# Patient Record
Sex: FEMALE | Race: WHITE | NOT HISPANIC OR LATINO | Employment: OTHER | URBAN - METROPOLITAN AREA
[De-identification: names, ages, dates, MRNs, and addresses within clinical notes are randomized per-mention and may not be internally consistent; named-entity substitution may affect disease eponyms.]

---

## 2017-02-27 RX ORDER — CARVEDILOL 6.25 MG/1
TABLET ORAL
Qty: 60 TABLET | Refills: 5 | Status: SHIPPED | OUTPATIENT
Start: 2017-02-27 | End: 2017-08-27 | Stop reason: SDUPTHER

## 2017-03-24 RX ORDER — EZETIMIBE 10 MG/1
TABLET ORAL
Qty: 30 TABLET | Refills: 5 | Status: SHIPPED | OUTPATIENT
Start: 2017-03-24 | End: 2017-04-28 | Stop reason: SDUPTHER

## 2017-04-28 RX ORDER — EZETIMIBE 10 MG/1
TABLET ORAL
Qty: 30 TABLET | Refills: 12 | Status: SHIPPED | OUTPATIENT
Start: 2017-04-28 | End: 2017-04-28 | Stop reason: SDUPTHER

## 2017-04-28 RX ORDER — EZETIMIBE 10 MG/1
TABLET ORAL
Qty: 30 TABLET | Refills: 12 | Status: SHIPPED | OUTPATIENT
Start: 2017-04-28 | End: 2017-06-16

## 2017-05-26 DIAGNOSIS — E03.9 ACQUIRED HYPOTHYROIDISM: ICD-10-CM

## 2017-05-26 RX ORDER — LEVOTHYROXINE SODIUM 0.05 MG/1
TABLET ORAL
Qty: 90 TABLET | Refills: 1 | Status: SHIPPED | OUTPATIENT
Start: 2017-05-26 | End: 2017-11-17 | Stop reason: SDUPTHER

## 2017-06-16 DIAGNOSIS — E78.5 HYPERLIPIDEMIA, UNSPECIFIED HYPERLIPIDEMIA TYPE: Primary | ICD-10-CM

## 2017-06-16 RX ORDER — SIMVASTATIN 10 MG
10 TABLET ORAL NIGHTLY
Qty: 90 TABLET | Refills: 2 | Status: SHIPPED | OUTPATIENT
Start: 2017-06-16 | End: 2018-03-26 | Stop reason: SDUPTHER

## 2017-06-29 DIAGNOSIS — K21.00 REFLUX ESOPHAGITIS: ICD-10-CM

## 2017-06-30 RX ORDER — OMEPRAZOLE 20 MG/1
CAPSULE, DELAYED RELEASE ORAL
Qty: 90 CAPSULE | Refills: 0 | Status: SHIPPED | OUTPATIENT
Start: 2017-06-30 | End: 2017-09-26 | Stop reason: SDUPTHER

## 2017-07-18 DIAGNOSIS — I10 ESSENTIAL HYPERTENSION: ICD-10-CM

## 2017-07-18 RX ORDER — LOSARTAN POTASSIUM AND HYDROCHLOROTHIAZIDE 25; 100 MG/1; MG/1
TABLET ORAL
Qty: 90 TABLET | Refills: 0 | Status: SHIPPED | OUTPATIENT
Start: 2017-07-18 | End: 2017-08-10

## 2017-08-03 DIAGNOSIS — E78.5 HYPERLIPIDEMIA, UNSPECIFIED HYPERLIPIDEMIA TYPE: Primary | ICD-10-CM

## 2017-08-03 DIAGNOSIS — E11.9 TYPE 2 DIABETES MELLITUS WITHOUT COMPLICATION, UNSPECIFIED LONG TERM INSULIN USE STATUS: ICD-10-CM

## 2017-08-03 DIAGNOSIS — E03.9 HYPOTHYROIDISM, UNSPECIFIED TYPE: ICD-10-CM

## 2017-08-03 LAB
ALBUMIN SERPL-MCNC: 4.3 G/DL (ref 3.5–5.2)
ALBUMIN/GLOB SERPL: 2 G/DL
ALP SERPL-CCNC: 61 U/L (ref 40–129)
ALT SERPL-CCNC: 21 U/L (ref 5–33)
AST SERPL-CCNC: 19 U/L (ref 5–32)
BASOPHILS # BLD AUTO: 0.03 10*3/MM3 (ref 0–0.2)
BASOPHILS NFR BLD AUTO: 0.7 % (ref 0–2)
BILIRUB SERPL-MCNC: 0.5 MG/DL (ref 0.2–1.2)
BUN SERPL-MCNC: 22 MG/DL (ref 8–23)
BUN/CREAT SERPL: 26.8 (ref 7–25)
CALCIUM SERPL-MCNC: 9.2 MG/DL (ref 8.8–10.5)
CHLORIDE SERPL-SCNC: 102 MMOL/L (ref 98–107)
CHOLEST SERPL-MCNC: 216 MG/DL (ref 0–200)
CHOLEST/HDLC SERPL: 3.54 {RATIO}
CO2 SERPL-SCNC: 27.4 MMOL/L (ref 22–29)
CREAT SERPL-MCNC: 0.82 MG/DL (ref 0.57–1)
EOSINOPHIL # BLD AUTO: 0.11 10*3/MM3 (ref 0.1–0.3)
EOSINOPHIL NFR BLD AUTO: 2.5 % (ref 0–4)
ERYTHROCYTE [DISTWIDTH] IN BLOOD BY AUTOMATED COUNT: 12.4 % (ref 11.5–14.5)
GLOBULIN SER CALC-MCNC: 2.2 GM/DL
GLUCOSE SERPL-MCNC: 92 MG/DL (ref 65–99)
HBA1C MFR BLD: 5.1 % (ref 4.8–5.6)
HCT VFR BLD AUTO: 38.7 % (ref 37–47)
HDLC SERPL-MCNC: 61 MG/DL (ref 40–60)
HGB BLD-MCNC: 12.9 G/DL (ref 12–16)
IMM GRANULOCYTES # BLD: 0.01 10*3/MM3 (ref 0–0.03)
IMM GRANULOCYTES NFR BLD: 0.2 % (ref 0–0.5)
LDLC SERPL CALC-MCNC: 126 MG/DL (ref 0–100)
LYMPHOCYTES # BLD AUTO: 1.25 10*3/MM3 (ref 0.6–4.8)
LYMPHOCYTES NFR BLD AUTO: 28.8 % (ref 20–45)
MCH RBC QN AUTO: 32.8 PG (ref 27–31)
MCHC RBC AUTO-ENTMCNC: 33.3 G/DL (ref 31–37)
MCV RBC AUTO: 98.5 FL (ref 81–99)
MONOCYTES # BLD AUTO: 0.37 10*3/MM3 (ref 0–1)
MONOCYTES NFR BLD AUTO: 8.5 % (ref 3–8)
NEUTROPHILS # BLD AUTO: 2.57 10*3/MM3 (ref 1.5–8.3)
NEUTROPHILS NFR BLD AUTO: 59.3 % (ref 45–70)
NRBC BLD AUTO-RTO: 0 /100 WBC (ref 0–0)
PLATELET # BLD AUTO: 246 10*3/MM3 (ref 140–500)
POTASSIUM SERPL-SCNC: 3.8 MMOL/L (ref 3.5–5.2)
PROT SERPL-MCNC: 6.5 G/DL (ref 6–8.5)
RBC # BLD AUTO: 3.93 10*6/MM3 (ref 4.2–5.4)
SODIUM SERPL-SCNC: 141 MMOL/L (ref 136–145)
TRIGL SERPL-MCNC: 146 MG/DL (ref 0–150)
TSH SERPL DL<=0.005 MIU/L-ACNC: 1.8 MIU/ML (ref 0.27–4.2)
VLDLC SERPL CALC-MCNC: 29.2 MG/DL (ref 7–27)
WBC # BLD AUTO: 4.34 10*3/MM3 (ref 4.8–10.8)

## 2017-08-10 ENCOUNTER — OFFICE VISIT (OUTPATIENT)
Dept: INTERNAL MEDICINE | Facility: CLINIC | Age: 72
End: 2017-08-10

## 2017-08-10 VITALS
WEIGHT: 185 LBS | OXYGEN SATURATION: 97 % | SYSTOLIC BLOOD PRESSURE: 120 MMHG | HEIGHT: 65 IN | HEART RATE: 77 BPM | RESPIRATION RATE: 16 BRPM | BODY MASS INDEX: 30.82 KG/M2 | DIASTOLIC BLOOD PRESSURE: 68 MMHG

## 2017-08-10 DIAGNOSIS — R32 INCONTINENCE IN FEMALE: ICD-10-CM

## 2017-08-10 DIAGNOSIS — E03.9 ACQUIRED HYPOTHYROIDISM: ICD-10-CM

## 2017-08-10 DIAGNOSIS — I10 ESSENTIAL HYPERTENSION: Primary | ICD-10-CM

## 2017-08-10 DIAGNOSIS — E78.2 MIXED HYPERLIPIDEMIA: ICD-10-CM

## 2017-08-10 DIAGNOSIS — M19.90 OSTEOARTHRITIS, UNSPECIFIED OSTEOARTHRITIS TYPE, UNSPECIFIED SITE: ICD-10-CM

## 2017-08-10 PROCEDURE — 99214 OFFICE O/P EST MOD 30 MIN: CPT | Performed by: INTERNAL MEDICINE

## 2017-08-10 RX ORDER — TRAMADOL HYDROCHLORIDE 50 MG/1
50 TABLET ORAL EVERY 6 HOURS PRN
Qty: 30 TABLET | Refills: 2 | Status: SHIPPED | OUTPATIENT
Start: 2017-08-10 | End: 2017-09-07

## 2017-08-10 RX ORDER — LOSARTAN POTASSIUM 100 MG/1
100 TABLET ORAL DAILY
Qty: 90 TABLET | Refills: 2 | Status: SHIPPED | OUTPATIENT
Start: 2017-08-10 | End: 2017-09-07

## 2017-08-10 NOTE — PROGRESS NOTES
Subjective   Carla Serrano is a 72 y.o. female.     Hypertension   Pertinent negatives include no chest pain, headaches or palpitations.        71 y/o WF with history of HTN, Hypothyroidism who presents for yearly follow up.  Recently taken off of the Zetia. Feels like she is thinking more clearly.  Though gained weight over the last year, feels like she is loosing a little weight.  Can definitely feel a difference.  Feels overall better.  Had been on this medicine for approx 2 years.  Eating well.  Having salads for lunch with low fat.  Eats eggs or substitutes for breakfast.  Does drink OJ in the AM.  Has two 12 oz Pepsi per day.  Milk for dinner.  Not much water.  Gardens during the summer for exercise.  Was previously doing regular exercise and plans to get back into this.  Would go to gym 3 x per week.  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      Overall energy level feels pretty stable.  Sometimes she feels down, depressed.  But this resolves spontaneously after a brief time.  Doesn't note any association with symptoms.  Never been medical therapy for depression.      Takes tylenol 650 every 6 hours for pain/muscle weakness. This weakness is all over and improves with tylenol.  No falls.      Allergies: Now on allegra for symptoms.  Rhinorrhea, congestion.  Improvess with medication.      Incontinence.  Patient has to wear pads every day due to incontinence.  No dysuria, foul smelling urine.  Worsens with cough, laughter etc.  Does not follow with OBGYN    Pt noted some dark red spotting in stool in association with mobic.  Has decreased dose to 1/4 tab.  Has worsening heartburn with this.  Cannot take other NSAIDS.  Continues to require omeprazole (for multiple years) with continued symptoms.  States last c scope is 5 years ago and normal.      The following portions of the patient's history were reviewed and updated as appropriate: allergies, current medications, past family history, past medical history, past  social history, past surgical history and problem list.    Review of Systems   Constitutional: Negative for appetite change, fatigue, fever and unexpected weight change.   HENT: Positive for congestion and rhinorrhea. Negative for ear discharge, sinus pressure and trouble swallowing.    Respiratory: Negative for cough and chest tightness.    Cardiovascular: Negative for chest pain, palpitations and leg swelling.   Gastrointestinal: Negative for constipation and diarrhea.   Genitourinary: Positive for enuresis. Negative for difficulty urinating, dysuria, frequency, hematuria, pelvic pain, vaginal bleeding and vaginal discharge.   Musculoskeletal: Positive for arthralgias and back pain.   Skin: Negative.    Neurological: Negative for dizziness, syncope, light-headedness and headaches.   Hematological: Negative.    Psychiatric/Behavioral: Negative.  Negative for behavioral problems, hallucinations and suicidal ideas.       Objective   Physical Exam   Constitutional: She is oriented to person, place, and time. She appears well-developed and well-nourished.   HENT:   Head: Normocephalic and atraumatic.   Right Ear: External ear normal.   Left Ear: External ear normal.   Eyes: EOM are normal. Pupils are equal, round, and reactive to light.   Neck: Normal range of motion. Neck supple. No JVD present. No tracheal deviation present. No thyromegaly present.   Cardiovascular: Normal rate, regular rhythm and normal heart sounds.  Exam reveals no friction rub.    No murmur heard.  Pulmonary/Chest: Effort normal and breath sounds normal. No respiratory distress. She has no wheezes.   Lymphadenopathy:     She has no cervical adenopathy.   Neurological: She is alert and oriented to person, place, and time. No cranial nerve deficit.   Skin: Skin is warm and dry.   Psychiatric: She has a normal mood and affect. Her behavior is normal.   Vitals reviewed.      Assessment/Plan   Carla was seen today for hypertension and  hypothyroidism.    Diagnoses and all orders for this visit:    Essential hypertension  -     losartan (COZAAR) 100 MG tablet; Take 1 tablet by mouth Daily.    Mixed hyperlipidemia    Acquired hypothyroidism    Incontinence in female  -     POC Urinalysis Dipstick, Automated    Osteoarthritis, unspecified osteoarthritis type, unspecified site  -     traMADol (ULTRAM) 50 MG tablet; Take 1 tablet by mouth Every 6 (Six) Hours As Needed for Moderate Pain (4-6).      HLD  - Total cholesterol increased but HDL as well. Ratio appropriate  - Zyetia d/c due to high cost.  Simvastatin increased to 10mg daily  - Continue with low sugar diet.  HbA1c decreased and appropriate.    Pelvic floor PT at results physiotherapy is an option  Start with stopping the HCTZ from the losartan  Check BP at home since we are taking away that diuretic  Goal <140/90    AR - feel free to take a 24 hour claritin or allegra    Rectal bleeding - likely due to mobic - stop that for now.  Having significant heartburn with her mobic. Stop for now  Replace with tramadol  Try to stay off mobic 6 weeks. Low threshold for GI referral for EGD      Weight gain - .  Already doing well with her thyroid and diet.        Patient seen and examined with resident physician. Agree with assessment and plan.  Spent 30 min in care of patient.

## 2017-08-28 RX ORDER — CARVEDILOL 6.25 MG/1
TABLET ORAL
Qty: 60 TABLET | Refills: 4 | Status: SHIPPED | OUTPATIENT
Start: 2017-08-28 | End: 2018-01-24 | Stop reason: SDUPTHER

## 2017-09-01 ENCOUNTER — TELEPHONE (OUTPATIENT)
Dept: INTERNAL MEDICINE | Facility: CLINIC | Age: 72
End: 2017-09-01

## 2017-09-01 NOTE — TELEPHONE ENCOUNTER
----- Message from Tor Clarke MD sent at 9/1/2017 12:53 PM EDT -----  Labs are stable no changes.      Pt given lab results. dg

## 2017-09-07 ENCOUNTER — RESULTS ENCOUNTER (OUTPATIENT)
Dept: INTERNAL MEDICINE | Facility: CLINIC | Age: 72
End: 2017-09-07

## 2017-09-07 ENCOUNTER — OFFICE VISIT (OUTPATIENT)
Dept: INTERNAL MEDICINE | Facility: CLINIC | Age: 72
End: 2017-09-07

## 2017-09-07 VITALS
WEIGHT: 187.4 LBS | BODY MASS INDEX: 31.22 KG/M2 | SYSTOLIC BLOOD PRESSURE: 128 MMHG | RESPIRATION RATE: 18 BRPM | DIASTOLIC BLOOD PRESSURE: 78 MMHG | HEART RATE: 84 BPM | OXYGEN SATURATION: 98 % | HEIGHT: 65 IN

## 2017-09-07 DIAGNOSIS — I10 ESSENTIAL HYPERTENSION: ICD-10-CM

## 2017-09-07 DIAGNOSIS — M17.12 PRIMARY OSTEOARTHRITIS OF LEFT KNEE: ICD-10-CM

## 2017-09-07 DIAGNOSIS — Z12.11 COLON CANCER SCREENING: Primary | ICD-10-CM

## 2017-09-07 DIAGNOSIS — Z12.11 COLON CANCER SCREENING: ICD-10-CM

## 2017-09-07 PROCEDURE — 99214 OFFICE O/P EST MOD 30 MIN: CPT | Performed by: INTERNAL MEDICINE

## 2017-09-07 RX ORDER — MELOXICAM 7.5 MG/1
7.5 TABLET ORAL DAILY
Qty: 90 TABLET | Refills: 2 | Status: SHIPPED | OUTPATIENT
Start: 2017-09-07 | End: 2018-07-12 | Stop reason: SINTOL

## 2017-09-07 RX ORDER — LOSARTAN POTASSIUM AND HYDROCHLOROTHIAZIDE 25; 100 MG/1; MG/1
1 TABLET ORAL DAILY
Qty: 90 TABLET | Refills: 2 | Status: SHIPPED | OUTPATIENT
Start: 2017-09-07 | End: 2017-11-06 | Stop reason: SDUPTHER

## 2017-09-07 NOTE — PROGRESS NOTES
"Subjective   Carla Serrano is a 72 y.o. female.     History of Present Illness   73 yo female with pmhx HTN. Was having significant urgency and incontinence with stress, so stopped the hctz component of her losartan hctz. Her BP did great but she got leg swelling so went back on losartan hctz.      She is coughing more now with hay fever, so getting a little worse.     She was having rectal bleeding we attributed to mobic, but tramadol made her sleepy, even half. She is now taking 1/2 mobic. SHe is still having rectal bleeding, very insignificant amounts.  She had a bad experience with her colonoscopy 5 years ago. She was awake during her colonoscopy.    Needs ears cleaned    Wants moles checked today.  Has 3 she worries about on her back.    The following portions of the patient's history were reviewed and updated as appropriate: allergies, current medications, past family history, past medical history, past social history, past surgical history and problem list.    Review of Systems   Constitutional: Negative for fatigue.   HENT: Positive for congestion and rhinorrhea. Negative for postnasal drip and sore throat.         Wax, requesting clean out.   Respiratory: Positive for cough.    Cardiovascular: Positive for leg swelling.        Better on hctz   Gastrointestinal: Negative.    Genitourinary: Negative.         Still with stress incontinence   Musculoskeletal: Positive for arthralgias, joint swelling and myalgias.   Hematological: Negative.    Psychiatric/Behavioral: Negative.    All other systems reviewed and are negative.      Objective   Physical Exam  /78  Pulse 84  Resp 18  Ht 65\" (165.1 cm)  Wt 187 lb 6.4 oz (85 kg)  SpO2 98%  BMI 31.18 kg/m2    General Appearance:  Alert, cooperative, no distress, appears stated age   Head:  Normocephalic, without obvious abnormality, atraumatic   Eyes:  PERRL, conjunctiva/corneas clear, EOM's intact   Ears:  Normal TM's and external ear canals, both ears "   Nose: Nares normal, septum midline,mucosa normal, no drainage or sinus tenderness   Throat: Lips, mucosa, and tongue normal; teeth and gums normal   Neck: Supple, symmetrical, trachea midline   Back:   Symmetric, no curvature, ROM normal, no CVA tenderness   Lungs:   Clear to auscultation bilaterally, respirations unlabored   Breasts:  No masses or tenderness   Heart:  Regular rate and rhythm, S1 and S2 normal, no murmur, rub, or gallop   Abdomen:   Soft, non-tender, bowel sounds active all four quadrants,  no masses, no organomegaly   Pelvic: Deferred   Extremities: Extremities normal, atraumatic, no cyanosis or edema   Pulses: 2+ and symmetric   Skin: Skin color, texture, turgor normal, no rashes or lesions   Lymph nodes: Cervical, supraclavicular, and axillary nodes normal   Neurologic: Normal       Assessment/Plan   Carla was seen today for heartburn.    Diagnoses and all orders for this visit:    Colon cancer screening  -     Cologuard; Future    Primary osteoarthritis of left knee  -     meloxicam (MOBIC) 7.5 MG tablet; Take 1 tablet by mouth Daily.    Essential hypertension  -     losartan-hydrochlorothiazide (HYZAAR) 100-25 MG per tablet; Take 1 tablet by mouth Daily.      Cerumen extraction today. Recommend debrox regularly at home several times per week.    Stop losartan plain, change to losartan hctz.  Will tolerate stress incontinence    Decrease mobic to 7.5   Ensure bleeding is not malignant, ordered cologuard.     HTN - well controlled.

## 2017-09-26 DIAGNOSIS — K21.00 REFLUX ESOPHAGITIS: ICD-10-CM

## 2017-09-27 RX ORDER — OMEPRAZOLE 20 MG/1
CAPSULE, DELAYED RELEASE ORAL
Qty: 90 CAPSULE | Refills: 0 | Status: SHIPPED | OUTPATIENT
Start: 2017-09-27 | End: 2017-12-27 | Stop reason: SDUPTHER

## 2017-11-06 DIAGNOSIS — I10 ESSENTIAL HYPERTENSION: ICD-10-CM

## 2017-11-06 RX ORDER — LOSARTAN POTASSIUM AND HYDROCHLOROTHIAZIDE 25; 100 MG/1; MG/1
1 TABLET ORAL DAILY
Qty: 90 TABLET | Refills: 2 | Status: SHIPPED | OUTPATIENT
Start: 2017-11-06 | End: 2018-10-26 | Stop reason: SDUPTHER

## 2017-11-17 DIAGNOSIS — E03.9 ACQUIRED HYPOTHYROIDISM: ICD-10-CM

## 2017-11-17 RX ORDER — LEVOTHYROXINE SODIUM 0.05 MG/1
TABLET ORAL
Qty: 90 TABLET | Refills: 1 | Status: SHIPPED | OUTPATIENT
Start: 2017-11-17 | End: 2018-05-22 | Stop reason: SDUPTHER

## 2017-12-27 DIAGNOSIS — K21.00 REFLUX ESOPHAGITIS: ICD-10-CM

## 2017-12-27 RX ORDER — OMEPRAZOLE 20 MG/1
CAPSULE, DELAYED RELEASE ORAL
Qty: 90 CAPSULE | Refills: 0 | Status: SHIPPED | OUTPATIENT
Start: 2017-12-27 | End: 2018-03-26 | Stop reason: SDUPTHER

## 2018-01-24 RX ORDER — CARVEDILOL 6.25 MG/1
TABLET ORAL
Qty: 60 TABLET | Refills: 3 | Status: SHIPPED | OUTPATIENT
Start: 2018-01-24 | End: 2018-05-25 | Stop reason: SDUPTHER

## 2018-03-26 DIAGNOSIS — K21.00 REFLUX ESOPHAGITIS: ICD-10-CM

## 2018-03-26 DIAGNOSIS — E78.5 HYPERLIPIDEMIA, UNSPECIFIED HYPERLIPIDEMIA TYPE: ICD-10-CM

## 2018-03-26 RX ORDER — SIMVASTATIN 10 MG
TABLET ORAL
Qty: 90 TABLET | Refills: 1 | Status: SHIPPED | OUTPATIENT
Start: 2018-03-26 | End: 2018-09-27 | Stop reason: SDUPTHER

## 2018-03-26 RX ORDER — OMEPRAZOLE 20 MG/1
CAPSULE, DELAYED RELEASE ORAL
Qty: 90 CAPSULE | Refills: 0 | Status: SHIPPED | OUTPATIENT
Start: 2018-03-26 | End: 2018-06-25 | Stop reason: SDUPTHER

## 2018-05-22 DIAGNOSIS — E03.9 ACQUIRED HYPOTHYROIDISM: ICD-10-CM

## 2018-05-22 RX ORDER — LEVOTHYROXINE SODIUM 0.05 MG/1
TABLET ORAL
Qty: 90 TABLET | Refills: 0 | Status: SHIPPED | OUTPATIENT
Start: 2018-05-22 | End: 2018-08-23 | Stop reason: SDUPTHER

## 2018-05-25 RX ORDER — CARVEDILOL 6.25 MG/1
TABLET ORAL
Qty: 60 TABLET | Refills: 2 | Status: SHIPPED | OUTPATIENT
Start: 2018-05-25 | End: 2018-08-23 | Stop reason: SDUPTHER

## 2018-06-25 DIAGNOSIS — K21.00 REFLUX ESOPHAGITIS: ICD-10-CM

## 2018-06-25 RX ORDER — OMEPRAZOLE 20 MG/1
CAPSULE, DELAYED RELEASE ORAL
Qty: 90 CAPSULE | Refills: 0 | Status: SHIPPED | OUTPATIENT
Start: 2018-06-25 | End: 2018-07-12

## 2018-07-12 ENCOUNTER — OFFICE VISIT (OUTPATIENT)
Dept: INTERNAL MEDICINE | Facility: CLINIC | Age: 73
End: 2018-07-12

## 2018-07-12 VITALS
HEIGHT: 65 IN | WEIGHT: 180.2 LBS | BODY MASS INDEX: 30.02 KG/M2 | OXYGEN SATURATION: 98 % | RESPIRATION RATE: 16 BRPM | DIASTOLIC BLOOD PRESSURE: 84 MMHG | HEART RATE: 103 BPM | SYSTOLIC BLOOD PRESSURE: 120 MMHG

## 2018-07-12 DIAGNOSIS — R11.2 NAUSEA AND VOMITING, INTRACTABILITY OF VOMITING NOT SPECIFIED, UNSPECIFIED VOMITING TYPE: Primary | ICD-10-CM

## 2018-07-12 DIAGNOSIS — Z79.899 HIGH RISK MEDICATION USE: ICD-10-CM

## 2018-07-12 DIAGNOSIS — K21.00 REFLUX ESOPHAGITIS: ICD-10-CM

## 2018-07-12 DIAGNOSIS — E78.2 MIXED HYPERLIPIDEMIA: ICD-10-CM

## 2018-07-12 DIAGNOSIS — I10 ESSENTIAL HYPERTENSION: ICD-10-CM

## 2018-07-12 PROBLEM — R11.10 VOMITING: Status: ACTIVE | Noted: 2018-07-12

## 2018-07-12 LAB
ALBUMIN SERPL-MCNC: 4.6 G/DL (ref 3.5–5.2)
ALBUMIN/GLOB SERPL: 1.8 G/DL
ALP SERPL-CCNC: 69 U/L (ref 40–129)
ALT SERPL-CCNC: 23 U/L (ref 5–33)
AMYLASE SERPL-CCNC: 125 U/L (ref 28–100)
AST SERPL-CCNC: 21 U/L (ref 5–32)
BASOPHILS # BLD AUTO: 0.03 10*3/MM3 (ref 0–0.2)
BASOPHILS NFR BLD AUTO: 0.6 % (ref 0–2)
BILIRUB SERPL-MCNC: 0.6 MG/DL (ref 0.2–1.2)
BUN SERPL-MCNC: 17 MG/DL (ref 8–23)
BUN/CREAT SERPL: 24.6 (ref 7–25)
CALCIUM SERPL-MCNC: 9.8 MG/DL (ref 8.8–10.5)
CHLORIDE SERPL-SCNC: 105 MMOL/L (ref 98–107)
CO2 SERPL-SCNC: 29.6 MMOL/L (ref 22–29)
CREAT SERPL-MCNC: 0.69 MG/DL (ref 0.57–1)
EOSINOPHIL # BLD AUTO: 0.03 10*3/MM3 (ref 0.1–0.3)
EOSINOPHIL NFR BLD AUTO: 0.6 % (ref 0–4)
ERYTHROCYTE [DISTWIDTH] IN BLOOD BY AUTOMATED COUNT: 12.6 % (ref 11.5–14.5)
GLOBULIN SER CALC-MCNC: 2.5 GM/DL
GLUCOSE SERPL-MCNC: 104 MG/DL (ref 65–99)
HCT VFR BLD AUTO: 40.3 % (ref 37–47)
HGB BLD-MCNC: 13.7 G/DL (ref 12–16)
IMM GRANULOCYTES # BLD: 0.01 10*3/MM3 (ref 0–0.03)
IMM GRANULOCYTES NFR BLD: 0.2 % (ref 0–0.5)
LIPASE SERPL-CCNC: 52 U/L (ref 13–60)
LYMPHOCYTES # BLD AUTO: 1.1 10*3/MM3 (ref 0.6–4.8)
LYMPHOCYTES NFR BLD AUTO: 20.4 % (ref 20–45)
MCH RBC QN AUTO: 33.9 PG (ref 27–31)
MCHC RBC AUTO-ENTMCNC: 34 G/DL (ref 31–37)
MCV RBC AUTO: 99.8 FL (ref 81–99)
MONOCYTES # BLD AUTO: 0.32 10*3/MM3 (ref 0–1)
MONOCYTES NFR BLD AUTO: 5.9 % (ref 3–8)
NEUTROPHILS # BLD AUTO: 3.9 10*3/MM3 (ref 1.5–8.3)
NEUTROPHILS NFR BLD AUTO: 72.3 % (ref 45–70)
NRBC BLD AUTO-RTO: 0 /100 WBC (ref 0–0)
PLATELET # BLD AUTO: 256 10*3/MM3 (ref 140–500)
POTASSIUM SERPL-SCNC: 3.7 MMOL/L (ref 3.5–5.2)
PROT SERPL-MCNC: 7.1 G/DL (ref 6–8.5)
RBC # BLD AUTO: 4.04 10*6/MM3 (ref 4.2–5.4)
SODIUM SERPL-SCNC: 147 MMOL/L (ref 136–145)
WBC # BLD AUTO: 5.39 10*3/MM3 (ref 4.8–10.8)

## 2018-07-12 PROCEDURE — 99214 OFFICE O/P EST MOD 30 MIN: CPT | Performed by: INTERNAL MEDICINE

## 2018-07-12 PROCEDURE — 93000 ELECTROCARDIOGRAM COMPLETE: CPT | Performed by: INTERNAL MEDICINE

## 2018-07-12 RX ORDER — OMEPRAZOLE 40 MG/1
40 CAPSULE, DELAYED RELEASE ORAL DAILY
Qty: 30 CAPSULE | Refills: 0 | Status: SHIPPED | OUTPATIENT
Start: 2018-07-12 | End: 2018-10-15 | Stop reason: SDUPTHER

## 2018-07-12 RX ORDER — ONDANSETRON 8 MG/1
8 TABLET, ORALLY DISINTEGRATING ORAL EVERY 8 HOURS PRN
Qty: 30 TABLET | Refills: 0 | Status: SHIPPED | OUTPATIENT
Start: 2018-07-12 | End: 2020-07-24

## 2018-07-12 NOTE — PROGRESS NOTES
Carla Serrano is a 73 y.o. female, who presents with a chief complaint of   Chief Complaint   Patient presents with   • Vomiting       HPI     74 yo female with pmhx HTN on arb, HL on statin, NSAID for OA - here for acute vomiting since last night.   She reports some similar symptoms previously.  She can drink, then about 15 min after eating will start to vomit.  Since last seen in August she has decreased her mobic. She stopped her mobic in July. She has continued to take prilosec daily. She has trouble with acid reflux at dinner too, been ongoing for several months at intermittent times.     No recent fever, chills. No diarrhea. No cough, dyspnea, cp. Does describe occasional palpitations.     The following portions of the patient's history were reviewed and updated as appropriate: allergies, current medications, past family history, past medical history, past social history, past surgical history and problem list.    Allergies: Patient has no known allergies.    Current Outpatient Prescriptions:   •  aspirin 81 MG tablet, Take 81 mg by mouth every night., Disp: , Rfl:   •  B Complex Vitamins (VITAMIN B COMPLEX IJ), Take 1 tablet by mouth 2 (two) times a day., Disp: , Rfl:   •  calcium citrate-vitamin d (CALCITRATE) 315-250 MG-UNIT tablet tablet, Take 2 tablets by mouth every night., Disp: , Rfl:   •  carvedilol (COREG) 6.25 MG tablet, TAKE ONE TABLET BY MOUTH TWICE A DAY WITH MEALS, Disp: 60 tablet, Rfl: 2  •  Cholecalciferol (VITAMIN D-3) 1000 UNITS capsule, Take 1,000 Units by mouth every night., Disp: , Rfl:   •  ciclopirox (PENLAC) 8 % solution, Apply 1 application topically As Needed (nail fungus). APPLY TO AFFECTED NAIL DAILY, Disp: 6.6 mL, Rfl: 1  •  CO ENZYME Q-10 PO, Take 100 mg by mouth 2 (two) times a day., Disp: , Rfl:   •  Flaxseed, Linseed, (FLAXSEED OIL) 1200 MG capsule, Take 2 capsules by mouth daily., Disp: , Rfl:   •  Ginkgo Biloba Extract 60 MG capsule, Take 1 tablet by mouth every  "morning., Disp: , Rfl:   •  Glucosamine-Chondroit-Vit C-Mn (GLUCOSAMINE CHONDR 1500 COMPLX) capsule, Take 2 capsules by mouth every morning., Disp: , Rfl:   •  levothyroxine (SYNTHROID, LEVOTHROID) 50 MCG tablet, TAKE ONE TABLET BY MOUTH EVERY MORNING, Disp: 90 tablet, Rfl: 0  •  losartan-hydrochlorothiazide (HYZAAR) 100-25 MG per tablet, Take 1 tablet by mouth Daily., Disp: 90 tablet, Rfl: 2  •  Misc Natural Products (ECHINACEA GOLDENSEAL PLUS) capsule, Take 900 mg by mouth every morning., Disp: , Rfl:   •  Multiple Vitamin (MULTIVITAMINS PO), Take 1 tablet by mouth daily., Disp: , Rfl:   •  Potassium Gluconate 595 MG capsule, Take 595 mcg/day by mouth every morning., Disp: , Rfl:   •  simvastatin (ZOCOR) 10 MG tablet, TAKE ONE TABLET BY MOUTH ONCE NIGHTLY, Disp: 90 tablet, Rfl: 1  •  omeprazole (PRILOSEC) 40 MG capsule, Take 1 capsule by mouth Daily., Disp: 30 capsule, Rfl: 0  •  ondansetron ODT (ZOFRAN ODT) 8 MG disintegrating tablet, Take 1 tablet by mouth Every 8 (Eight) Hours As Needed for Nausea or Vomiting., Disp: 30 tablet, Rfl: 0  Medications Discontinued During This Encounter   Medication Reason   • meloxicam (MOBIC) 7.5 MG tablet Side effects   • omeprazole (priLOSEC) 20 MG capsule        Review of Systems   Constitutional: Negative.    HENT: Negative.    Eyes: Negative.    Gastrointestinal: Positive for nausea and vomiting. Negative for abdominal pain and diarrhea.   Musculoskeletal:        OA   Neurological: Positive for dizziness. Negative for syncope.        Mild dizziness   All other systems reviewed and are negative.            /84   Pulse 103   Resp 16   Ht 165.1 cm (65\")   Wt 81.7 kg (180 lb 3.2 oz)   SpO2 98%   BMI 29.99 kg/m²       Physical Exam   Constitutional: She is oriented to person, place, and time. She appears well-developed and well-nourished.   HENT:   Head: Normocephalic and atraumatic.   Right Ear: External ear normal.   Left Ear: External ear normal.   Mouth/Throat: " Oropharynx is clear and moist.   MMM   Eyes: EOM are normal. Pupils are equal, round, and reactive to light.   Neck: Normal range of motion. Neck supple. No thyromegaly present.   Cardiovascular: Normal rate, regular rhythm and normal heart sounds.  Exam reveals no friction rub.    No murmur heard.  Pulmonary/Chest: Effort normal and breath sounds normal. No respiratory distress. She has no wheezes.   Abdominal: She exhibits no distension. There is no tenderness.   Neurological: She is alert and oriented to person, place, and time.   Skin: Skin is warm and dry. Capillary refill takes less than 2 seconds.   Psychiatric: She has a normal mood and affect. Her behavior is normal.   Nursing note and vitals reviewed.      Lab Results (most recent)     None          Results for orders placed or performed in visit on 08/03/17   Comprehensive Metabolic Panel   Result Value Ref Range    Glucose 92 65 - 99 mg/dL    BUN 22 8 - 23 mg/dL    Creatinine 0.82 0.57 - 1.00 mg/dL    eGFR Non African Am 69 >60 mL/min/1.73    eGFR African Am 83 >60 mL/min/1.73    BUN/Creatinine Ratio 26.8 (H) 7.0 - 25.0    Sodium 141 136 - 145 mmol/L    Potassium 3.8 3.5 - 5.2 mmol/L    Chloride 102 98 - 107 mmol/L    Total CO2 27.4 22.0 - 29.0 mmol/L    Calcium 9.2 8.8 - 10.5 mg/dL    Total Protein 6.5 6.0 - 8.5 g/dL    Albumin 4.30 3.50 - 5.20 g/dL    Globulin 2.2 gm/dL    A/G Ratio 2.0 g/dL    Total Bilirubin 0.5 0.2 - 1.2 mg/dL    Alkaline Phosphatase 61 40 - 129 U/L    AST (SGOT) 19 5 - 32 U/L    ALT (SGPT) 21 5 - 33 U/L   Hemoglobin A1c   Result Value Ref Range    Hemoglobin A1C 5.10 4.80 - 5.60 %   Lipid Panel With / Chol / HDL Ratio   Result Value Ref Range    Total Cholesterol 216 (H) 0 - 200 mg/dL    Triglycerides 146 0 - 150 mg/dL    HDL Cholesterol 61 (H) 40 - 60 mg/dL    VLDL Cholesterol 29.2 (H) 7 - 27 mg/dL    LDL Cholesterol  126 (H) 0 - 100 mg/dL    Chol/HDL Ratio 3.54    TSH   Result Value Ref Range    TSH 1.800 0.270 - 4.200 mIU/mL    CBC & Differential   Result Value Ref Range    WBC 4.34 (L) 4.80 - 10.80 10*3/mm3    RBC 3.93 (L) 4.20 - 5.40 10*6/mm3    Hemoglobin 12.9 12.0 - 16.0 g/dL    Hematocrit 38.7 37.0 - 47.0 %    MCV 98.5 81.0 - 99.0 fL    MCH 32.8 (H) 27.0 - 31.0 pg    MCHC 33.3 31.0 - 37.0 g/dL    RDW 12.4 11.5 - 14.5 %    Platelets 246 140 - 500 10*3/mm3    Neutrophil Rel % 59.3 45.0 - 70.0 %    Lymphocyte Rel % 28.8 20.0 - 45.0 %    Monocyte Rel % 8.5 (H) 3.0 - 8.0 %    Eosinophil Rel % 2.5 0.0 - 4.0 %    Basophil Rel % 0.7 0.0 - 2.0 %    Neutrophils Absolute 2.57 1.50 - 8.30 10*3/mm3    Lymphocytes Absolute 1.25 0.60 - 4.80 10*3/mm3    Monocytes Absolute 0.37 0.00 - 1.00 10*3/mm3    Eosinophils Absolute 0.11 0.10 - 0.30 10*3/mm3    Basophils Absolute 0.03 0.00 - 0.20 10*3/mm3    Immature Granulocyte Rel % 0.2 0.0 - 0.5 %    Immature Grans Absolute 0.01 0.00 - 0.03 10*3/mm3    nRBC 0.0 0.0 - 0.0 /100 WBC     ECG 12 Lead  Date/Time: 7/12/2018 12:39 PM  Performed by: KAYY GEORGES  Authorized by: KAYY GEORGES   Interpreted by ED physician  Comparison: not compared with previous ECG   Rhythm: sinus rhythm  Rate: normal  BPM: 72  Conduction: conduction normal  ST Segments: ST segments normal  T Waves: T waves normal  QRS axis: normal  Clinical impression: normal ECG                  Carla was seen today for vomiting.    Diagnoses and all orders for this visit:    Nausea and vomiting, intractability of vomiting not specified, unspecified vomiting type  -     ondansetron ODT (ZOFRAN ODT) 8 MG disintegrating tablet; Take 1 tablet by mouth Every 8 (Eight) Hours As Needed for Nausea or Vomiting.  -     Comprehensive metabolic panel  -     CBC w AUTO Differential  -     Lipase  -     Amylase    Essential hypertension  -     ECG 12 Lead    Mixed hyperlipidemia    High risk medication use    Reflux esophagitis  -     omeprazole (PRILOSEC) 40 MG capsule; Take 1 capsule by mouth Daily.  -     Ambulatory Referral to Gastroenterology  -      ECG 12 Lead    Vomiting in last 24 hours more refractory  Likely vomiting syndrome on top of KEYUR associated with NSAID use  Stop mobic  Hold losartan and zocor while vomiting  Start zofran  [place] diet  ER if unable to keep any fluids down even with zofran on board  Will recommend EGD due to chronicity/colonoscopy due regardless as screen.      Return in about 4 weeks (around 8/9/2018).    Tor Clarke MD  07/12/2018

## 2018-07-12 NOTE — PATIENT INSTRUCTIONS
Carla was seen today for vomiting.    Diagnoses and all orders for this visit:    Nausea and vomiting, intractability of vomiting not specified, unspecified vomiting type  -     ondansetron ODT (ZOFRAN ODT) 8 MG disintegrating tablet; Take 1 tablet by mouth Every 8 (Eight) Hours As Needed for Nausea or Vomiting.    Essential hypertension    Mixed hyperlipidemia    High risk medication use    Reflux esophagitis  -     omeprazole (PRILOSEC) 40 MG capsule; Take 1 capsule by mouth Daily.  -     Ambulatory Referral to Gastroenterology    Other orders  -     ECG 12 Lead    Vomiting in last 24 hours more refractory  Likely vomiting syndrome on top of KEYUR associated with NSAID use  Stop mobic  Hold losartan and zocor while vomiting  Start zofran  [place] diet  ER if unable to keep any fluids down even with zofran on board  Will recommend EGD due to chronicity/colonoscopy due regardless as screen.      Return in about 4 weeks (around 8/9/2018).    Tor Clarke MD

## 2018-07-17 ENCOUNTER — TELEPHONE (OUTPATIENT)
Dept: INTERNAL MEDICINE | Facility: CLINIC | Age: 73
End: 2018-07-17

## 2018-07-17 NOTE — TELEPHONE ENCOUNTER
----- Message from Tor Clarke MD sent at 7/13/2018  5:16 PM EDT -----  Labs are good, how is she feeling.  IF her vomiting continues may need to go the ER for IVF and further evaluation but I hope things are calming down.    Pt is feeling much better and given lab results.dg

## 2018-08-02 ENCOUNTER — TELEPHONE (OUTPATIENT)
Dept: INTERNAL MEDICINE | Facility: CLINIC | Age: 73
End: 2018-08-02

## 2018-08-02 NOTE — TELEPHONE ENCOUNTER
Dr GEORGES, PLEASE ADVISE:    Patient called saying that she has decided not to go through with her GI referral. Stated that among some extra stressors going on currently for her making it more difficult to get to get to an appt than usual, she is no longer symptomatic and has dc'd the mobic entirely and has noticed a tremendous improvement since doing so. Just wanted you to be aware! Thanks.

## 2018-08-23 DIAGNOSIS — E03.9 ACQUIRED HYPOTHYROIDISM: ICD-10-CM

## 2018-08-23 RX ORDER — LEVOTHYROXINE SODIUM 0.05 MG/1
TABLET ORAL
Qty: 90 TABLET | Refills: 1 | Status: SHIPPED | OUTPATIENT
Start: 2018-08-23 | End: 2019-02-18 | Stop reason: SDUPTHER

## 2018-08-23 RX ORDER — CARVEDILOL 6.25 MG/1
TABLET ORAL
Qty: 180 TABLET | Refills: 1 | Status: SHIPPED | OUTPATIENT
Start: 2018-08-23 | End: 2019-02-21 | Stop reason: SDUPTHER

## 2018-09-27 DIAGNOSIS — E78.5 HYPERLIPIDEMIA, UNSPECIFIED HYPERLIPIDEMIA TYPE: ICD-10-CM

## 2018-09-27 RX ORDER — SIMVASTATIN 10 MG
TABLET ORAL
Qty: 90 TABLET | Refills: 1 | Status: SHIPPED | OUTPATIENT
Start: 2018-09-27 | End: 2019-03-28 | Stop reason: SDUPTHER

## 2018-10-15 RX ORDER — OMEPRAZOLE 20 MG/1
CAPSULE, DELAYED RELEASE ORAL
Qty: 90 CAPSULE | Refills: 1 | Status: SHIPPED | OUTPATIENT
Start: 2018-10-15 | End: 2019-04-12 | Stop reason: SDUPTHER

## 2018-10-26 DIAGNOSIS — I10 ESSENTIAL HYPERTENSION: ICD-10-CM

## 2018-10-26 RX ORDER — LOSARTAN POTASSIUM AND HYDROCHLOROTHIAZIDE 25; 100 MG/1; MG/1
TABLET ORAL
Qty: 90 TABLET | Refills: 1 | Status: SHIPPED | OUTPATIENT
Start: 2018-10-26 | End: 2019-04-17 | Stop reason: SDUPTHER

## 2019-02-18 DIAGNOSIS — E03.9 ACQUIRED HYPOTHYROIDISM: ICD-10-CM

## 2019-02-18 RX ORDER — LEVOTHYROXINE SODIUM 0.05 MG/1
TABLET ORAL
Qty: 90 TABLET | Refills: 0 | Status: SHIPPED | OUTPATIENT
Start: 2019-02-18 | End: 2019-05-16 | Stop reason: SDUPTHER

## 2019-02-21 RX ORDER — CARVEDILOL 6.25 MG/1
TABLET ORAL
Qty: 180 TABLET | Refills: 1 | Status: SHIPPED | OUTPATIENT
Start: 2019-02-21 | End: 2019-08-23 | Stop reason: SDUPTHER

## 2019-03-28 DIAGNOSIS — E78.5 HYPERLIPIDEMIA, UNSPECIFIED HYPERLIPIDEMIA TYPE: ICD-10-CM

## 2019-03-28 RX ORDER — SIMVASTATIN 10 MG
TABLET ORAL
Qty: 90 TABLET | Refills: 1 | Status: SHIPPED | OUTPATIENT
Start: 2019-03-28 | End: 2019-09-24 | Stop reason: SDUPTHER

## 2019-04-12 RX ORDER — OMEPRAZOLE 20 MG/1
CAPSULE, DELAYED RELEASE ORAL
Qty: 90 CAPSULE | Refills: 0 | Status: SHIPPED | OUTPATIENT
Start: 2019-04-12 | End: 2019-07-10 | Stop reason: SDUPTHER

## 2019-04-17 DIAGNOSIS — I10 ESSENTIAL HYPERTENSION: ICD-10-CM

## 2019-04-17 RX ORDER — LOSARTAN POTASSIUM AND HYDROCHLOROTHIAZIDE 25; 100 MG/1; MG/1
TABLET ORAL
Qty: 90 TABLET | Refills: 0 | Status: SHIPPED | OUTPATIENT
Start: 2019-04-17 | End: 2019-07-22 | Stop reason: SDUPTHER

## 2019-04-18 ENCOUNTER — OFFICE VISIT (OUTPATIENT)
Dept: INTERNAL MEDICINE | Facility: CLINIC | Age: 74
End: 2019-04-18

## 2019-04-18 VITALS
HEART RATE: 99 BPM | TEMPERATURE: 97.6 F | WEIGHT: 185.2 LBS | RESPIRATION RATE: 16 BRPM | DIASTOLIC BLOOD PRESSURE: 72 MMHG | BODY MASS INDEX: 30.86 KG/M2 | OXYGEN SATURATION: 94 % | HEIGHT: 65 IN | SYSTOLIC BLOOD PRESSURE: 108 MMHG

## 2019-04-18 DIAGNOSIS — F32.A DEPRESSION, UNSPECIFIED DEPRESSION TYPE: Primary | ICD-10-CM

## 2019-04-18 DIAGNOSIS — E03.9 HYPOTHYROIDISM, UNSPECIFIED TYPE: ICD-10-CM

## 2019-04-18 PROCEDURE — 99213 OFFICE O/P EST LOW 20 MIN: CPT | Performed by: FAMILY MEDICINE

## 2019-04-18 RX ORDER — SENNOSIDES 8.6 MG
650 CAPSULE ORAL EVERY 8 HOURS PRN
COMMUNITY

## 2019-04-18 NOTE — PROGRESS NOTES
Carla Serrano is a 74 y.o. female, who presents with a chief complaint of   Chief Complaint   Patient presents with   • Depression   • Insomnia       HPI     Pt presents with the complaint of crying spells, irritable mood, dysphoric mood, fatigue, worsening over the past 9 months.  She is having trouble sleeping; she falls asleep okay but wakes up around 2 a.m. And can't get back to sleep.  She is taking ALLY-e and it helps her relax.  Denies SI.  She stays busy with her grandchildren.  She takes levothyroxine 50 mcg daily but hasn't had levels checked for 1.5 years.    The following portions of the patient's history were reviewed and updated as appropriate: allergies, current medications, past family history, past medical history, past social history, past surgical history and problem list.    Allergies: Patient has no known allergies.    Review of Systems   Constitutional: Positive for fatigue.   Gastrointestinal: Negative for constipation and diarrhea.   Musculoskeletal: Positive for arthralgias.   Psychiatric/Behavioral: Positive for dysphoric mood and sleep disturbance.             Wt Readings from Last 3 Encounters:   04/18/19 84 kg (185 lb 3.2 oz)   07/12/18 81.7 kg (180 lb 3.2 oz)   09/07/17 85 kg (187 lb 6.4 oz)     Temp Readings from Last 3 Encounters:   04/18/19 97.6 °F (36.4 °C) (Oral)   04/01/16 98.4 °F (36.9 °C) (Oral)     BP Readings from Last 3 Encounters:   04/18/19 108/72   07/12/18 120/84   09/07/17 128/78     Pulse Readings from Last 3 Encounters:   04/18/19 99   07/12/18 103   09/07/17 84     Body mass index is 30.82 kg/m².  @LASTSAO2(3)@    Physical Exam   Constitutional: She is oriented to person, place, and time. She appears well-developed and well-nourished. No distress.   HENT:   Head: Normocephalic and atraumatic.   Eyes: Conjunctivae and EOM are normal.   Neck: Neck supple. No thyromegaly present.   Cardiovascular: Normal rate, regular rhythm and normal heart sounds.    Pulmonary/Chest: Effort normal. No respiratory distress.   Abdominal: Soft. There is no tenderness.   Musculoskeletal: Normal range of motion. She exhibits no edema.   Neurological: She is alert and oriented to person, place, and time.   Skin: Skin is warm and dry.   Psychiatric: She has a normal mood and affect. Her behavior is normal.   Nursing note and vitals reviewed.        Carla was seen today for depression and insomnia.    Diagnoses and all orders for this visit:    Depression, unspecified depression type    Hypothyroidism, unspecified type  -     TSH  -     T4, Free    1. Depression.  Options discussed.  She wants to hold off on medication.  She will work on lifestyle measures and we'll check thyroid levels.  Declines counseling as well.    2. Hypothyroidism.  On replacement.  See #1.  Check thyroid levels.    Outpatient Medications Prior to Visit   Medication Sig Dispense Refill   • acetaminophen (TYLENOL) 650 MG 8 hr tablet Take 650 mg by mouth Every 8 (Eight) Hours As Needed.     • aspirin 81 MG tablet Take 81 mg by mouth every night.     • B Complex Vitamins (VITAMIN B COMPLEX IJ) Take 1 tablet by mouth 2 (two) times a day.     • carvedilol (COREG) 6.25 MG tablet TAKE ONE TABLET BY MOUTH TWICE A DAY WITH MEALS 180 tablet 1   • Cholecalciferol (VITAMIN D-3) 1000 UNITS capsule Take 1,000 Units by mouth every night.     • CO ENZYME Q-10 PO Take 100 mg by mouth 2 (two) times a day.     • Flaxseed, Linseed, (FLAXSEED OIL) 1200 MG capsule Take 2 capsules by mouth daily.     • Ginkgo Biloba Extract 60 MG capsule Take 1 tablet by mouth every morning.     • Glucosamine-Chondroit-Vit C-Mn (GLUCOSAMINE CHONDR 1500 COMPLX) capsule Take 2 capsules by mouth every morning.     • levothyroxine (SYNTHROID, LEVOTHROID) 50 MCG tablet TAKE ONE TABLET BY MOUTH EVERY MORNING 90 tablet 0   • losartan-hydrochlorothiazide (HYZAAR) 100-25 MG per tablet TAKE ONE TABLET BY MOUTH DAILY 90 tablet 0   • Misc Natural Products  (ECHINACEA GOLDENSEAL PLUS) capsule Take 900 mg by mouth every morning.     • Multiple Vitamin (MULTIVITAMINS PO) Take 1 tablet by mouth daily.     • omeprazole (priLOSEC) 20 MG capsule TAKE ONE CAPSULE BY MOUTH EVERY MORNING 90 capsule 0   • ondansetron ODT (ZOFRAN ODT) 8 MG disintegrating tablet Take 1 tablet by mouth Every 8 (Eight) Hours As Needed for Nausea or Vomiting. (Patient taking differently: Take 8 mg by mouth As Needed for Nausea or Vomiting.) 30 tablet 0   • Potassium Gluconate 595 MG capsule Take 595 mcg/day by mouth every morning.     • S-Adenosylmethionine (ALLY-E) 200 MG tablet delayed-release Take  by mouth 2 (Two) Times a Day.     • simvastatin (ZOCOR) 10 MG tablet TAKE ONE TABLET BY MOUTH ONCE NIGHTLY 90 tablet 1   • calcium citrate-vitamin d (CALCITRATE) 315-250 MG-UNIT tablet tablet Take 2 tablets by mouth every night.     • ciclopirox (PENLAC) 8 % solution Apply 1 application topically As Needed (nail fungus). APPLY TO AFFECTED NAIL DAILY 6.6 mL 1     No facility-administered medications prior to visit.      No orders of the defined types were placed in this encounter.    [unfilled]  Medications Discontinued During This Encounter   Medication Reason   • calcium citrate-vitamin d (CALCITRATE) 315-250 MG-UNIT tablet tablet *Re-Entry   • ciclopirox (PENLAC) 8 % solution *Therapy completed         Return for Next scheduled follow up.

## 2019-04-19 LAB
T4 FREE SERPL-MCNC: 1.34 NG/DL (ref 0.93–1.7)
TSH SERPL DL<=0.005 MIU/L-ACNC: 1.31 MIU/ML (ref 0.27–4.2)

## 2019-05-16 DIAGNOSIS — E03.9 ACQUIRED HYPOTHYROIDISM: ICD-10-CM

## 2019-05-16 RX ORDER — LEVOTHYROXINE SODIUM 0.05 MG/1
50 TABLET ORAL EVERY MORNING
Qty: 90 TABLET | Refills: 0 | Status: SHIPPED | OUTPATIENT
Start: 2019-05-16 | End: 2019-08-20 | Stop reason: SDUPTHER

## 2019-06-19 ENCOUNTER — OFFICE VISIT (OUTPATIENT)
Dept: INTERNAL MEDICINE | Facility: CLINIC | Age: 74
End: 2019-06-19

## 2019-06-19 VITALS
OXYGEN SATURATION: 93 % | TEMPERATURE: 97.6 F | HEIGHT: 65 IN | HEART RATE: 84 BPM | BODY MASS INDEX: 30.79 KG/M2 | DIASTOLIC BLOOD PRESSURE: 80 MMHG | SYSTOLIC BLOOD PRESSURE: 122 MMHG | WEIGHT: 184.8 LBS | RESPIRATION RATE: 18 BRPM

## 2019-06-19 DIAGNOSIS — R73.03 PREDIABETES: ICD-10-CM

## 2019-06-19 DIAGNOSIS — I10 ESSENTIAL HYPERTENSION: ICD-10-CM

## 2019-06-19 DIAGNOSIS — I25.10 CORONARY ARTERY DISEASE INVOLVING NATIVE CORONARY ARTERY OF NATIVE HEART WITHOUT ANGINA PECTORIS: Primary | ICD-10-CM

## 2019-06-19 DIAGNOSIS — E03.9 ACQUIRED HYPOTHYROIDISM: ICD-10-CM

## 2019-06-19 DIAGNOSIS — Z12.31 BREAST CANCER SCREENING BY MAMMOGRAM: ICD-10-CM

## 2019-06-19 DIAGNOSIS — Z78.0 POST-MENOPAUSAL: ICD-10-CM

## 2019-06-19 DIAGNOSIS — E78.2 MIXED HYPERLIPIDEMIA: ICD-10-CM

## 2019-06-19 DIAGNOSIS — G47.33 OSA (OBSTRUCTIVE SLEEP APNEA): ICD-10-CM

## 2019-06-19 LAB
ALBUMIN SERPL-MCNC: 4.8 G/DL (ref 3.5–5.2)
ALBUMIN/GLOB SERPL: 2.5 G/DL
ALP SERPL-CCNC: 60 U/L (ref 39–117)
ALT SERPL-CCNC: 22 U/L (ref 1–33)
AST SERPL-CCNC: 19 U/L (ref 1–32)
BASOPHILS # BLD AUTO: 0.02 10*3/MM3 (ref 0–0.2)
BASOPHILS NFR BLD AUTO: 0.6 % (ref 0–1.5)
BILIRUB SERPL-MCNC: 0.5 MG/DL (ref 0.2–1.2)
BUN SERPL-MCNC: 18 MG/DL (ref 8–23)
BUN/CREAT SERPL: 26.5 (ref 7–25)
CALCIUM SERPL-MCNC: 10.1 MG/DL (ref 8.6–10.5)
CHLORIDE SERPL-SCNC: 101 MMOL/L (ref 98–107)
CHOLEST SERPL-MCNC: 218 MG/DL (ref 0–200)
CO2 SERPL-SCNC: 30.1 MMOL/L (ref 22–29)
CREAT SERPL-MCNC: 0.68 MG/DL (ref 0.57–1)
EOSINOPHIL # BLD AUTO: 0.07 10*3/MM3 (ref 0–0.4)
EOSINOPHIL NFR BLD AUTO: 2.1 % (ref 0.3–6.2)
ERYTHROCYTE [DISTWIDTH] IN BLOOD BY AUTOMATED COUNT: 12.5 % (ref 12.3–15.4)
GLOBULIN SER CALC-MCNC: 1.9 GM/DL
GLUCOSE SERPL-MCNC: 95 MG/DL (ref 65–99)
HBA1C MFR BLD: 5 % (ref 4.8–5.6)
HCT VFR BLD AUTO: 40.2 % (ref 34–46.6)
HDLC SERPL-MCNC: 61 MG/DL (ref 40–60)
HGB BLD-MCNC: 13.1 G/DL (ref 12–15.9)
IMM GRANULOCYTES # BLD AUTO: 0 10*3/MM3 (ref 0–0.05)
IMM GRANULOCYTES NFR BLD AUTO: 0 % (ref 0–0.5)
LDLC SERPL CALC-MCNC: 121 MG/DL (ref 0–100)
LDLC/HDLC SERPL: 1.98 {RATIO}
LYMPHOCYTES # BLD AUTO: 1.22 10*3/MM3 (ref 0.7–3.1)
LYMPHOCYTES NFR BLD AUTO: 37.1 % (ref 19.6–45.3)
MCH RBC QN AUTO: 33.6 PG (ref 26.6–33)
MCHC RBC AUTO-ENTMCNC: 32.6 G/DL (ref 31.5–35.7)
MCV RBC AUTO: 103.1 FL (ref 79–97)
MONOCYTES # BLD AUTO: 0.37 10*3/MM3 (ref 0.1–0.9)
MONOCYTES NFR BLD AUTO: 11.2 % (ref 5–12)
NEUTROPHILS # BLD AUTO: 1.61 10*3/MM3 (ref 1.7–7)
NEUTROPHILS NFR BLD AUTO: 49 % (ref 42.7–76)
NRBC BLD AUTO-RTO: 0 /100 WBC (ref 0–0.2)
PLATELET # BLD AUTO: 245 10*3/MM3 (ref 140–450)
POTASSIUM SERPL-SCNC: 4.7 MMOL/L (ref 3.5–5.2)
PROT SERPL-MCNC: 6.7 G/DL (ref 6–8.5)
RBC # BLD AUTO: 3.9 10*6/MM3 (ref 3.77–5.28)
SODIUM SERPL-SCNC: 142 MMOL/L (ref 136–145)
TRIGL SERPL-MCNC: 181 MG/DL (ref 0–150)
VLDLC SERPL CALC-MCNC: 36.2 MG/DL
WBC # BLD AUTO: 3.29 10*3/MM3 (ref 3.4–10.8)

## 2019-06-19 PROCEDURE — 99214 OFFICE O/P EST MOD 30 MIN: CPT | Performed by: INTERNAL MEDICINE

## 2019-06-19 RX ORDER — FEXOFENADINE HYDROCHLORIDE 60 MG/1
60 TABLET, FILM COATED ORAL DAILY
COMMUNITY

## 2019-06-19 NOTE — PROGRESS NOTES
Carla Serrano is a 74 y.o. female, who presents with a chief complaint of   Chief Complaint   Patient presents with   • Establish Care   • Hypertension   • Hyperlipidemia   • ear wax removal       HPI   Pt here to est care.  She was a dr. Clarke patient    CAD - pt s/p CABG 2011.  Last stress test 2016. She denies soa, cp, light, or LE edema.  She follows with dr. Pantoja.     Hypothyroidism - labs normal in April..  No hair/skin changes    HLD - on statin.  No cramps or myalgias    HTN - well controlled.  No ha/dizziness.      GERD - on PPI.  Sx well controlled.     Recent depression phase.  The pt has now been able to work outside which she really enjoys.  She has been feeling much better since she has been more active. She does not currently want any meds.  She is sleeping ok.      She is trying to eat a healthy diet and stay active.  She wears a fit bit and tries to get 4-5,000 steps per day.     S/p Left TKA - 4/2016.  She can't take nsaids bc of GERD so she takes tylenol.      She has had cerumen impaction in past and would like ears checked.     The following portions of the patient's history were reviewed and updated as appropriate: allergies, current medications, past family history, past medical history, past social history, past surgical history and problem list.    Allergies: Patient has no known allergies.    Review of Systems   Constitutional: Negative.    HENT: Negative.    Eyes: Negative.    Respiratory: Negative.    Cardiovascular: Negative.    Gastrointestinal: Negative.    Endocrine: Negative.    Genitourinary: Negative.    Musculoskeletal: Positive for arthralgias.   Skin: Negative.    Allergic/Immunologic: Negative.    Neurological: Negative.    Hematological: Negative.    Psychiatric/Behavioral: Negative.    All other systems reviewed and are negative.            Wt Readings from Last 3 Encounters:   06/19/19 83.8 kg (184 lb 12.8 oz)   04/18/19 84 kg (185 lb 3.2 oz)   07/12/18 81.7 kg (180 lb  3.2 oz)     Temp Readings from Last 3 Encounters:   06/19/19 97.6 °F (36.4 °C) (Oral)   04/18/19 97.6 °F (36.4 °C) (Oral)   04/01/16 98.4 °F (36.9 °C) (Oral)     BP Readings from Last 3 Encounters:   06/19/19 122/80   04/18/19 108/72   07/12/18 120/84     Pulse Readings from Last 3 Encounters:   06/19/19 84   04/18/19 99   07/12/18 103     Body mass index is 30.75 kg/m².  @LASTSAO2(3)@    Physical Exam   Constitutional: She is oriented to person, place, and time. She appears well-developed and well-nourished. No distress.   HENT:   Head: Normocephalic and atraumatic.   Right Ear: External ear normal.   Left Ear: External ear normal.   Nose: Nose normal.   Mouth/Throat: Oropharynx is clear and moist.   Tm normal bilat, no cerumen impaction   Eyes: Conjunctivae and EOM are normal. Pupils are equal, round, and reactive to light.   Neck: Normal range of motion. Neck supple.   Cardiovascular: Normal rate, regular rhythm, normal heart sounds and intact distal pulses.   Pulmonary/Chest: Effort normal and breath sounds normal. No respiratory distress. She has no wheezes.   Musculoskeletal: Normal range of motion.   Normal gait   Neurological: She is alert and oriented to person, place, and time.   Skin: Skin is warm and dry.   Psychiatric: She has a normal mood and affect. Her behavior is normal. Judgment and thought content normal.   Nursing note and vitals reviewed.      Results for orders placed or performed in visit on 04/18/19   TSH   Result Value Ref Range    TSH 1.310 0.270 - 4.200 mIU/mL   T4, Free   Result Value Ref Range    Free T4 1.34 0.93 - 1.70 ng/dL           Carla was seen today for establish care, hypertension, hyperlipidemia and ear wax removal.    Diagnoses and all orders for this visit:    Coronary artery disease involving native coronary artery of native heart without angina pectoris  -     Comprehensive Metabolic Panel  -     CBC & Differential  -     Lipid Panel With LDL / HDL Ratio    Essential  hypertension  -     Comprehensive Metabolic Panel  -     CBC & Differential    Mixed hyperlipidemia  -     Comprehensive Metabolic Panel  -     Lipid Panel With LDL / HDL Ratio    JOVANNA (obstructive sleep apnea)    Acquired hypothyroidism    Prediabetes  -     Comprehensive Metabolic Panel  -     CBC & Differential  -     Lipid Panel With LDL / HDL Ratio  -     Hemoglobin A1c    Breast cancer screening by mammogram  -     Mammo Screening Bilateral With CAD; Future    Post-menopausal  -     DEXA Bone Density Axial; Future      Encouraged healthy diet and exercise.  Ov/labs in 6mo.     Outpatient Medications Prior to Visit   Medication Sig Dispense Refill   • acetaminophen (TYLENOL) 650 MG 8 hr tablet Take 650 mg by mouth Every 8 (Eight) Hours As Needed.     • aspirin 81 MG tablet Take 81 mg by mouth every night.     • B Complex Vitamins (VITAMIN B COMPLEX IJ) Take 1 tablet by mouth 2 (two) times a day.     • carvedilol (COREG) 6.25 MG tablet TAKE ONE TABLET BY MOUTH TWICE A DAY WITH MEALS 180 tablet 1   • Cholecalciferol (VITAMIN D-3) 1000 UNITS capsule Take 1,000 Units by mouth every night.     • CO ENZYME Q-10 PO Take 100 mg by mouth 2 (two) times a day.     • fexofenadine (ALLEGRA) 60 MG tablet Take 60 mg by mouth Daily.     • Flaxseed, Linseed, (FLAXSEED OIL) 1200 MG capsule Take 2 capsules by mouth daily.     • Ginkgo Biloba Extract 60 MG capsule Take 1 tablet by mouth every morning.     • Glucosamine-Chondroit-Vit C-Mn (GLUCOSAMINE CHONDR 1500 COMPLX) capsule Take 2 capsules by mouth every morning.     • levothyroxine (SYNTHROID, LEVOTHROID) 50 MCG tablet Take 1 tablet by mouth Every Morning. 90 tablet 0   • losartan-hydrochlorothiazide (HYZAAR) 100-25 MG per tablet TAKE ONE TABLET BY MOUTH DAILY 90 tablet 0   • Misc Natural Products (ECHINACEA GOLDENSEAL PLUS) capsule Take 900 mg by mouth every morning.     • Multiple Vitamin (MULTIVITAMINS PO) Take 1 tablet by mouth daily.     • omeprazole (priLOSEC) 20 MG  capsule TAKE ONE CAPSULE BY MOUTH EVERY MORNING 90 capsule 0   • ondansetron ODT (ZOFRAN ODT) 8 MG disintegrating tablet Take 1 tablet by mouth Every 8 (Eight) Hours As Needed for Nausea or Vomiting. (Patient taking differently: Take 8 mg by mouth As Needed for Nausea or Vomiting.) 30 tablet 0   • Potassium Gluconate 595 MG capsule Take 595 mcg/day by mouth every morning.     • S-Adenosylmethionine (ALLY-E) 200 MG tablet delayed-release Take  by mouth 2 (Two) Times a Day.     • simvastatin (ZOCOR) 10 MG tablet TAKE ONE TABLET BY MOUTH ONCE NIGHTLY 90 tablet 1     No facility-administered medications prior to visit.      No orders of the defined types were placed in this encounter.    [unfilled]  There are no discontinued medications.      Return in about 6 months (around 12/19/2019) for Recheck, labs.

## 2019-06-21 LAB
HCV AB S/CO SERPL IA: <0.1 S/CO RATIO (ref 0–0.9)
Lab: NORMAL
WRITTEN AUTHORIZATION: NORMAL

## 2019-07-08 ENCOUNTER — APPOINTMENT (OUTPATIENT)
Dept: BONE DENSITY | Facility: HOSPITAL | Age: 74
End: 2019-07-08

## 2019-07-08 ENCOUNTER — TELEPHONE (OUTPATIENT)
Dept: INTERNAL MEDICINE | Facility: CLINIC | Age: 74
End: 2019-07-08

## 2019-07-08 ENCOUNTER — HOSPITAL ENCOUNTER (OUTPATIENT)
Dept: MAMMOGRAPHY | Facility: HOSPITAL | Age: 74
Discharge: HOME | End: 2019-07-08
Admitting: INTERNAL MEDICINE

## 2019-07-08 DIAGNOSIS — Z12.31 BREAST CANCER SCREENING BY MAMMOGRAM: ICD-10-CM

## 2019-07-08 DIAGNOSIS — Z78.0 POST-MENOPAUSAL: ICD-10-CM

## 2019-07-08 PROCEDURE — 77080 DXA BONE DENSITY AXIAL: CPT

## 2019-07-08 PROCEDURE — 77063 BREAST TOMOSYNTHESIS BI: CPT

## 2019-07-08 PROCEDURE — 77067 SCR MAMMO BI INCL CAD: CPT

## 2019-07-08 NOTE — TELEPHONE ENCOUNTER
Letter mailed today per chart.     ----- Message from Hilda Loera MD sent at 7/8/2019  1:18 PM EDT -----  Mammogram normal.  Repeat 1 year

## 2019-07-10 RX ORDER — OMEPRAZOLE 20 MG/1
CAPSULE, DELAYED RELEASE ORAL
Qty: 90 CAPSULE | Refills: 1 | Status: SHIPPED | OUTPATIENT
Start: 2019-07-10 | End: 2020-01-07

## 2019-07-22 DIAGNOSIS — I10 ESSENTIAL HYPERTENSION: ICD-10-CM

## 2019-07-22 RX ORDER — LOSARTAN POTASSIUM AND HYDROCHLOROTHIAZIDE 25; 100 MG/1; MG/1
TABLET ORAL
Qty: 90 TABLET | Refills: 0 | Status: SHIPPED | OUTPATIENT
Start: 2019-07-22 | End: 2019-10-21 | Stop reason: SDUPTHER

## 2019-08-20 DIAGNOSIS — E03.9 ACQUIRED HYPOTHYROIDISM: ICD-10-CM

## 2019-08-20 RX ORDER — LEVOTHYROXINE SODIUM 0.05 MG/1
TABLET ORAL
Qty: 90 TABLET | Refills: 0 | Status: SHIPPED | OUTPATIENT
Start: 2019-08-20 | End: 2019-11-18 | Stop reason: SDUPTHER

## 2019-08-23 RX ORDER — CARVEDILOL 6.25 MG/1
6.25 TABLET ORAL 2 TIMES DAILY WITH MEALS
Qty: 180 TABLET | Refills: 1 | Status: SHIPPED | OUTPATIENT
Start: 2019-08-23 | End: 2020-02-19

## 2019-09-24 DIAGNOSIS — E78.5 HYPERLIPIDEMIA, UNSPECIFIED HYPERLIPIDEMIA TYPE: ICD-10-CM

## 2019-09-24 RX ORDER — SIMVASTATIN 10 MG
TABLET ORAL
Qty: 90 TABLET | Refills: 0 | Status: SHIPPED | OUTPATIENT
Start: 2019-09-24 | End: 2019-12-19

## 2019-10-21 DIAGNOSIS — I10 ESSENTIAL HYPERTENSION: ICD-10-CM

## 2019-10-21 RX ORDER — LOSARTAN POTASSIUM AND HYDROCHLOROTHIAZIDE 25; 100 MG/1; MG/1
TABLET ORAL
Qty: 90 TABLET | Refills: 0 | Status: SHIPPED | OUTPATIENT
Start: 2019-10-21 | End: 2020-01-20

## 2019-11-18 DIAGNOSIS — E03.9 ACQUIRED HYPOTHYROIDISM: ICD-10-CM

## 2019-11-18 RX ORDER — LEVOTHYROXINE SODIUM 0.05 MG/1
TABLET ORAL
Qty: 90 TABLET | Refills: 0 | Status: SHIPPED | OUTPATIENT
Start: 2019-11-18 | End: 2020-02-19

## 2019-12-06 DIAGNOSIS — I10 ESSENTIAL HYPERTENSION: ICD-10-CM

## 2019-12-06 DIAGNOSIS — E78.5 HYPERLIPIDEMIA, UNSPECIFIED HYPERLIPIDEMIA TYPE: ICD-10-CM

## 2019-12-06 DIAGNOSIS — E03.9 ACQUIRED HYPOTHYROIDISM: Primary | ICD-10-CM

## 2019-12-06 DIAGNOSIS — R73.03 PREDIABETES: ICD-10-CM

## 2019-12-11 ENCOUNTER — RESULTS ENCOUNTER (OUTPATIENT)
Dept: INTERNAL MEDICINE | Facility: CLINIC | Age: 74
End: 2019-12-11

## 2019-12-11 DIAGNOSIS — E03.9 ACQUIRED HYPOTHYROIDISM: ICD-10-CM

## 2019-12-11 DIAGNOSIS — E78.5 HYPERLIPIDEMIA, UNSPECIFIED HYPERLIPIDEMIA TYPE: ICD-10-CM

## 2019-12-11 DIAGNOSIS — I10 ESSENTIAL HYPERTENSION: ICD-10-CM

## 2019-12-11 DIAGNOSIS — R73.03 PREDIABETES: ICD-10-CM

## 2019-12-11 LAB
ALBUMIN SERPL-MCNC: 4.5 G/DL (ref 3.5–5.2)
ALBUMIN/GLOB SERPL: 1.9 G/DL
ALP SERPL-CCNC: 57 U/L (ref 39–117)
ALT SERPL-CCNC: 19 U/L (ref 1–33)
AST SERPL-CCNC: 16 U/L (ref 1–32)
BASOPHILS # BLD AUTO: 0.03 10*3/MM3 (ref 0–0.2)
BASOPHILS NFR BLD AUTO: 1 % (ref 0–1.5)
BILIRUB SERPL-MCNC: 0.4 MG/DL (ref 0.2–1.2)
BUN SERPL-MCNC: 17 MG/DL (ref 8–23)
BUN/CREAT SERPL: 21.5 (ref 7–25)
CALCIUM SERPL-MCNC: 9.9 MG/DL (ref 8.6–10.5)
CHLORIDE SERPL-SCNC: 102 MMOL/L (ref 98–107)
CHOLEST SERPL-MCNC: 205 MG/DL (ref 0–200)
CO2 SERPL-SCNC: 29 MMOL/L (ref 22–29)
CREAT SERPL-MCNC: 0.79 MG/DL (ref 0.57–1)
EOSINOPHIL # BLD AUTO: 0.09 10*3/MM3 (ref 0–0.4)
EOSINOPHIL NFR BLD AUTO: 2.9 % (ref 0.3–6.2)
ERYTHROCYTE [DISTWIDTH] IN BLOOD BY AUTOMATED COUNT: 12.1 % (ref 12.3–15.4)
GLOBULIN SER CALC-MCNC: 2.4 GM/DL
GLUCOSE SERPL-MCNC: 87 MG/DL (ref 65–99)
HBA1C MFR BLD: 5.4 % (ref 4.8–5.6)
HCT VFR BLD AUTO: 38.6 % (ref 34–46.6)
HDLC SERPL-MCNC: 64 MG/DL (ref 40–60)
HGB BLD-MCNC: 13.2 G/DL (ref 12–15.9)
IMM GRANULOCYTES # BLD AUTO: 0.01 10*3/MM3 (ref 0–0.05)
IMM GRANULOCYTES NFR BLD AUTO: 0.3 % (ref 0–0.5)
LDLC SERPL CALC-MCNC: 115 MG/DL (ref 0–100)
LDLC/HDLC SERPL: 1.79 {RATIO}
LYMPHOCYTES # BLD AUTO: 1.17 10*3/MM3 (ref 0.7–3.1)
LYMPHOCYTES NFR BLD AUTO: 37.7 % (ref 19.6–45.3)
MCH RBC QN AUTO: 33.8 PG (ref 26.6–33)
MCHC RBC AUTO-ENTMCNC: 34.2 G/DL (ref 31.5–35.7)
MCV RBC AUTO: 99 FL (ref 79–97)
MONOCYTES # BLD AUTO: 0.3 10*3/MM3 (ref 0.1–0.9)
MONOCYTES NFR BLD AUTO: 9.7 % (ref 5–12)
NEUTROPHILS # BLD AUTO: 1.5 10*3/MM3 (ref 1.7–7)
NEUTROPHILS NFR BLD AUTO: 48.4 % (ref 42.7–76)
NRBC BLD AUTO-RTO: 0 /100 WBC (ref 0–0.2)
PLATELET # BLD AUTO: 239 10*3/MM3 (ref 140–450)
POTASSIUM SERPL-SCNC: 4 MMOL/L (ref 3.5–5.2)
PROT SERPL-MCNC: 6.9 G/DL (ref 6–8.5)
RBC # BLD AUTO: 3.9 10*6/MM3 (ref 3.77–5.28)
SODIUM SERPL-SCNC: 143 MMOL/L (ref 136–145)
T4 FREE SERPL-MCNC: 1.43 NG/DL (ref 0.93–1.7)
TRIGL SERPL-MCNC: 131 MG/DL (ref 0–150)
TSH SERPL DL<=0.005 MIU/L-ACNC: 1.32 UIU/ML (ref 0.27–4.2)
VLDLC SERPL CALC-MCNC: 26.2 MG/DL
WBC # BLD AUTO: 3.1 10*3/MM3 (ref 3.4–10.8)

## 2019-12-17 ENCOUNTER — OFFICE VISIT (OUTPATIENT)
Dept: INTERNAL MEDICINE | Facility: CLINIC | Age: 74
End: 2019-12-17

## 2019-12-17 VITALS
WEIGHT: 182 LBS | OXYGEN SATURATION: 98 % | HEIGHT: 65 IN | RESPIRATION RATE: 16 BRPM | DIASTOLIC BLOOD PRESSURE: 72 MMHG | BODY MASS INDEX: 30.32 KG/M2 | SYSTOLIC BLOOD PRESSURE: 118 MMHG | HEART RATE: 80 BPM

## 2019-12-17 DIAGNOSIS — I10 ESSENTIAL HYPERTENSION: ICD-10-CM

## 2019-12-17 DIAGNOSIS — E03.9 ACQUIRED HYPOTHYROIDISM: ICD-10-CM

## 2019-12-17 DIAGNOSIS — Z00.00 MEDICARE ANNUAL WELLNESS VISIT, INITIAL: Primary | ICD-10-CM

## 2019-12-17 DIAGNOSIS — I25.10 CORONARY ARTERY DISEASE INVOLVING NATIVE CORONARY ARTERY OF NATIVE HEART WITHOUT ANGINA PECTORIS: ICD-10-CM

## 2019-12-17 DIAGNOSIS — E78.5 HYPERLIPIDEMIA, UNSPECIFIED HYPERLIPIDEMIA TYPE: ICD-10-CM

## 2019-12-17 DIAGNOSIS — R73.03 PREDIABETES: ICD-10-CM

## 2019-12-17 PROCEDURE — G0439 PPPS, SUBSEQ VISIT: HCPCS | Performed by: INTERNAL MEDICINE

## 2019-12-17 PROCEDURE — 90732 PPSV23 VACC 2 YRS+ SUBQ/IM: CPT | Performed by: INTERNAL MEDICINE

## 2019-12-17 PROCEDURE — G0009 ADMIN PNEUMOCOCCAL VACCINE: HCPCS | Performed by: INTERNAL MEDICINE

## 2019-12-17 PROCEDURE — 99214 OFFICE O/P EST MOD 30 MIN: CPT | Performed by: INTERNAL MEDICINE

## 2019-12-17 NOTE — PATIENT INSTRUCTIONS
Medicare Wellness  Personal Prevention Plan of Service     Date of Office Visit:  2019  Encounter Provider:  Hilda Loera MD  Place of Service:  [facility]  Patient Name: Carla Serrano  :  1945    As part of the Medicare Wellness portion of your visit today, we are providing you with this personalized preventive plan of services (PPPS). This plan is based upon recommendations of the [place] Preventive Services Task Force (USPSTF) and the Advisory Committee on Immunization Practices (ACIP).    This lists the preventive care services that should be considered, and provides dates of when you are due. Items listed as completed are up-to-date and do not require any further intervention.    Health Maintenance   Topic Date Due   • ZOSTER VACCINE (1 of 2) 04/15/1996   • PNEUMOCOCCAL VACCINES (65+ LOW/MEDIUM RISK) (2 of 2 - PPSV23) 2017   • COLOGUARD  10/11/2020   • LIPID PANEL  12/10/2020   • MEDICARE ANNUAL WELLNESS  2020   • MAMMOGRAM  2021   • DXA SCAN  2021   • TDAP/TD VACCINES (2 - Td) 2026   • HEPATITIS C SCREENING  Completed   • INFLUENZA VACCINE  Completed       No orders of the defined types were placed in this encounter.      Return in about 6 months (around 2020) for Recheck, labs.

## 2019-12-17 NOTE — PROGRESS NOTES
The ABCs of the Annual Wellness Visit  Initial Medicare Wellness Visit    Chief Complaint   Patient presents with   • Hypertension   • Hypothyroidism       Subjective   History of Present Illness:  Carla Serrano is a 74 y.o. female who presents for an Initial Medicare Wellness Visit.    HEALTH RISK ASSESSMENT    Recent Hospitalizations:  No hospitalization(s) within the last year.    Current Medical Providers:  Patient Care Team:  Hilda Loera MD as PCP - General (Internal Medicine & Pediatrics)  Hilda Loera MD as PCP - Claims Attributed    Smoking Status:  Social History     Tobacco Use   Smoking Status Never Smoker   Smokeless Tobacco Never Used       Alcohol Consumption:  Social History     Substance and Sexual Activity   Alcohol Use No       Depression Screen:   PHQ-2/PHQ-9 Depression Screening 12/17/2019   Little interest or pleasure in doing things 0   Feeling down, depressed, or hopeless 1   Trouble falling or staying asleep, or sleeping too much 0   Feeling tired or having little energy 0   Poor appetite or overeating 0   Feeling bad about yourself - or that you are a failure or have let yourself or your family down 0   Trouble concentrating on things, such as reading the newspaper or watching television 0   Moving or speaking so slowly that other people could have noticed. Or the opposite - being so fidgety or restless that you have been moving around a lot more than usual 0   Thoughts that you would be better off dead, or of hurting yourself in some way 0   Total Score 1   If you checked off any problems, how difficult have these problems made it for you to do your work, take care of things at home, or get along with other people? Not difficult at all       Fall Risk Screen:  STEADI Fall Risk Assessment was completed, and patient is at LOW risk for falls.Assessment completed on:12/17/2019    Health Habits and Functional and Cognitive Screening:  Functional & Cognitive Status  12/17/2019   Do you have difficulty preparing food and eating? No   Do you have difficulty bathing yourself, getting dressed or grooming yourself? No   Do you have difficulty using the toilet? No   Do you have difficulty moving around from place to place? No   Do you have trouble with steps or getting out of a bed or a chair? No   Current Diet Well Balanced Diet   Dental Exam Up to date   Eye Exam Up to date   Exercise (times per week) 7 times per week   Current Exercise Activities Include Walking   Do you need help using the phone?  No   Are you deaf or do you have serious difficulty hearing?  No   Do you need help with transportation? No   Do you need help shopping? No   Do you need help preparing meals?  No   Do you need help with housework?  No   Do you need help with laundry? No   Do you need help taking your medications? No   Do you need help managing money? No   Do you ever drive or ride in a car without wearing a seat belt? No   Have you felt unusual stress, anger or loneliness in the last month? No   Who do you live with? Spouse   If you need help, do you have trouble finding someone available to you? No   Have you been bothered in the last four weeks by sexual problems? No   Do you have difficulty concentrating, remembering or making decisions? No         Does the patient have evidence of cognitive impairment? No    Asprin use counseling:Taking ASA appropriately as indicated    Age-appropriate Screening Schedule:  Refer to the list below for future screening recommendations based on patient's age, sex and/or medical conditions. Orders for these recommended tests are listed in the plan section. The patient has been provided with a written plan.    Health Maintenance   Topic Date Due   • ZOSTER VACCINE (1 of 2) 04/15/1996   • PNEUMOCOCCAL VACCINES (65+ LOW/MEDIUM RISK) (2 of 2 - PPSV23) 03/18/2017   • LIPID PANEL  12/10/2020   • MAMMOGRAM  07/08/2021   • DXA SCAN  07/08/2021   • TDAP/TD VACCINES (2 - Td)  02/23/2026   • INFLUENZA VACCINE  Completed          The following portions of the patient's history were reviewed and updated as appropriate: allergies, current medications, past family history, past medical history, past social history, past surgical history and problem list.    Outpatient Medications Prior to Visit   Medication Sig Dispense Refill   • acetaminophen (TYLENOL) 650 MG 8 hr tablet Take 650 mg by mouth Every 8 (Eight) Hours As Needed.     • aspirin 81 MG tablet Take 81 mg by mouth every night.     • B Complex Vitamins (VITAMIN B COMPLEX IJ) Take 1 tablet by mouth 2 (two) times a day.     • carvedilol (COREG) 6.25 MG tablet Take 1 tablet by mouth 2 (Two) Times a Day With Meals. 180 tablet 1   • Cholecalciferol (VITAMIN D-3) 1000 UNITS capsule Take 1,000 Units by mouth every night.     • CO ENZYME Q-10 PO Take 100 mg by mouth 2 (two) times a day.     • fexofenadine (ALLEGRA) 60 MG tablet Take 60 mg by mouth Daily.     • Flaxseed, Linseed, (FLAXSEED OIL) 1200 MG capsule Take 2 capsules by mouth daily.     • Ginkgo Biloba Extract 60 MG capsule Take 1 tablet by mouth every morning.     • Glucosamine-Chondroit-Vit C-Mn (GLUCOSAMINE CHONDR 1500 COMPLX) capsule Take 2 capsules by mouth every morning.     • levothyroxine (SYNTHROID, LEVOTHROID) 50 MCG tablet TAKE ONE TABLET BY MOUTH EVERY MORNING 90 tablet 0   • losartan-hydrochlorothiazide (HYZAAR) 100-25 MG per tablet TAKE ONE TABLET BY MOUTH DAILY 90 tablet 0   • Misc Natural Products (ECHINACEA GOLDENSEAL PLUS) capsule Take 900 mg by mouth every morning.     • Multiple Vitamin (MULTIVITAMINS PO) Take 1 tablet by mouth daily.     • omeprazole (priLOSEC) 20 MG capsule TAKE ONE CAPSULE BY MOUTH EVERY MORNING 90 capsule 1   • ondansetron ODT (ZOFRAN ODT) 8 MG disintegrating tablet Take 1 tablet by mouth Every 8 (Eight) Hours As Needed for Nausea or Vomiting. (Patient taking differently: Take 8 mg by mouth As Needed for Nausea or Vomiting.) 30 tablet 0   •  "Potassium Gluconate 595 MG capsule Take 595 mcg/day by mouth every morning.     • S-Adenosylmethionine (ALLY-E) 200 MG tablet delayed-release Take  by mouth 2 (Two) Times a Day.     • simvastatin (ZOCOR) 10 MG tablet TAKE ONE TABLET BY MOUTH NIGHTLY 90 tablet 0     No facility-administered medications prior to visit.        Patient Active Problem List   Diagnosis   • Edema   • DJD (degenerative joint disease) of knee   • CAD (coronary artery disease)   • SVT (supraventricular tachycardia) (CMS/[facility])   • Hypertension   • JOVANNA (obstructive sleep apnea)   • Hyperlipidemia   • Hypothyroidism   • Prediabetes   • Incontinence in female   • Colon cancer screening   • Vomiting   • High risk medication use   • Reflux esophagitis   • Depression       Advanced Care Planning:  Patient does not have an advance directive - information provided to the patient today    Review of Systems    Compared to one year ago, the patient feels her physical health is better.  Compared to one year ago, the patient feels her mental health is better.    Reviewed chart for potential of high risk medication in the elderly: yes  Reviewed chart for potential of harmful drug interactions in the elderly:yes    Objective         Vitals:    12/17/19 1255   BP: 118/72   Pulse: 80   Resp: 16   SpO2: 98%   Weight: 82.6 kg (182 lb)   Height: 165.1 cm (65\")       Body mass index is 30.29 kg/m².  Discussed the patient's BMI with her. The BMI is above average; no BMI management plan is appropriate..    Physical Exam    Lab Results   Component Value Date    GLU 87 12/10/2019    CHLPL 205 (H) 12/10/2019    TRIG 131 12/10/2019    HDL 64 (H) 12/10/2019     (H) 12/10/2019    VLDL 26.2 12/10/2019    HGBA1C 5.40 12/10/2019        Assessment/Plan   Medicare Risks and Personalized Health Plan  CMS Preventative Services Quick Reference  Immunizations Discussed/Encouraged (specific immunizations; Pneumococcal 23 and Shingrix )  Obesity/Overweight "   Polypharmacy  Urinary Incontinence    The above risks/problems have been discussed with the patient.  Pertinent information has been shared with the patient in the After Visit Summary.  Follow up plans and orders are seen below in the Assessment/Plan Section.    There are no diagnoses linked to this encounter.  Follow Up:  Return in about 6 months (around 6/17/2020) for Recheck, labs.     An After Visit Summary and PPPS were given to the patient.

## 2019-12-17 NOTE — PROGRESS NOTES
Carla Serrano is a 74 y.o. female, who presents with a chief complaint of   Chief Complaint   Patient presents with   • Hypertension   • Hypothyroidism       HPI   Pt here to f/u     CAD - pt s/p CABG 2011.  Last stress test 2016. She denies soa, cp, light, or LE edema.  She follows with dr. Pantoja.      Hypothyroidism - labs normal in April..  No hair/skin changes     HLD - on statin.  No cramps or myalgias     HTN - well controlled.  No ha/dizziness.       GERD - on PPI.  Sx well controlled.      She is trying to eat a healthy diet and stay active.  She wears a fit bit and tries to get 4-5,000 steps per day.      S/p Left TKA - 4/2016.  She can't take nsaids bc of GERD so she takes tylenol.         The following portions of the patient's history were reviewed and updated as appropriate: allergies, current medications, past family history, past medical history, past social history, past surgical history and problem list.    Allergies: Patient has no known allergies.    Review of Systems   Constitutional: Negative.    HENT: Negative.    Eyes: Negative.    Respiratory: Negative.    Cardiovascular: Negative.    Gastrointestinal: Negative.    Endocrine: Negative.    Genitourinary: Negative.    Musculoskeletal: Negative.    Skin: Negative.    Allergic/Immunologic: Negative.    Neurological: Negative.    Hematological: Negative.    Psychiatric/Behavioral: Negative.    All other systems reviewed and are negative.            Wt Readings from Last 3 Encounters:   12/17/19 82.6 kg (182 lb)   06/19/19 83.8 kg (184 lb 12.8 oz)   04/18/19 84 kg (185 lb 3.2 oz)     Temp Readings from Last 3 Encounters:   06/19/19 97.6 °F (36.4 °C) (Oral)   04/18/19 97.6 °F (36.4 °C) (Oral)   04/01/16 98.4 °F (36.9 °C) (Oral)     BP Readings from Last 3 Encounters:   12/17/19 118/72   06/19/19 122/80   04/18/19 108/72     Pulse Readings from Last 3 Encounters:   12/17/19 80   06/19/19 84   04/18/19 99     Body mass index is 30.29  kg/m².  @LASTSAO2(3)@    Physical Exam   Constitutional: She is oriented to person, place, and time. She appears well-developed and well-nourished. No distress.   HENT:   Head: Normocephalic and atraumatic.   Right Ear: External ear normal.   Left Ear: External ear normal.   Nose: Nose normal.   Mouth/Throat: Oropharynx is clear and moist.   Eyes: Pupils are equal, round, and reactive to light. Conjunctivae and EOM are normal.   Neck: Normal range of motion. Neck supple.   Cardiovascular: Normal rate, regular rhythm, normal heart sounds and intact distal pulses.   Pulmonary/Chest: Effort normal and breath sounds normal. No respiratory distress. She has no wheezes.   Musculoskeletal: Normal range of motion.   Normal gait   Neurological: She is alert and oriented to person, place, and time.   Skin: Skin is warm and dry.   Psychiatric: She has a normal mood and affect. Her behavior is normal. Judgment and thought content normal.   Nursing note and vitals reviewed.      Results for orders placed or performed in visit on 12/11/19   Lipid Panel With LDL / HDL Ratio   Result Value Ref Range    Total Cholesterol 205 (H) 0 - 200 mg/dL    Triglycerides 131 0 - 150 mg/dL    HDL Cholesterol 64 (H) 40 - 60 mg/dL    VLDL Cholesterol 26.2 mg/dL    LDL Cholesterol  115 (H) 0 - 100 mg/dL    LDL/HDL Ratio 1.79    Hemoglobin A1c   Result Value Ref Range    Hemoglobin A1C 5.40 4.80 - 5.60 %   Comprehensive Metabolic Panel   Result Value Ref Range    Glucose 87 65 - 99 mg/dL    BUN 17 8 - 23 mg/dL    Creatinine 0.79 0.57 - 1.00 mg/dL    eGFR Non African Am 71 >60 mL/min/1.73    eGFR African Am 86 >60 mL/min/1.73    BUN/Creatinine Ratio 21.5 7.0 - 25.0    Sodium 143 136 - 145 mmol/L    Potassium 4.0 3.5 - 5.2 mmol/L    Chloride 102 98 - 107 mmol/L    Total CO2 29.0 22.0 - 29.0 mmol/L    Calcium 9.9 8.6 - 10.5 mg/dL    Total Protein 6.9 6.0 - 8.5 g/dL    Albumin 4.50 3.50 - 5.20 g/dL    Globulin 2.4 gm/dL    A/G Ratio 1.9 g/dL    Total  Bilirubin 0.4 0.2 - 1.2 mg/dL    Alkaline Phosphatase 57 39 - 117 U/L    AST (SGOT) 16 1 - 32 U/L    ALT (SGPT) 19 1 - 33 U/L   TSH   Result Value Ref Range    TSH 1.320 0.270 - 4.200 uIU/mL   T4, Free   Result Value Ref Range    Free T4 1.43 0.93 - 1.70 ng/dL   CBC & Differential   Result Value Ref Range    WBC 3.10 (L) 3.40 - 10.80 10*3/mm3    RBC 3.90 3.77 - 5.28 10*6/mm3    Hemoglobin 13.2 12.0 - 15.9 g/dL    Hematocrit 38.6 34.0 - 46.6 %    MCV 99.0 (H) 79.0 - 97.0 fL    MCH 33.8 (H) 26.6 - 33.0 pg    MCHC 34.2 31.5 - 35.7 g/dL    RDW 12.1 (L) 12.3 - 15.4 %    Platelets 239 140 - 450 10*3/mm3    Neutrophil Rel % 48.4 42.7 - 76.0 %    Lymphocyte Rel % 37.7 19.6 - 45.3 %    Monocyte Rel % 9.7 5.0 - 12.0 %    Eosinophil Rel % 2.9 0.3 - 6.2 %    Basophil Rel % 1.0 0.0 - 1.5 %    Neutrophils Absolute 1.50 (L) 1.70 - 7.00 10*3/mm3    Lymphocytes Absolute 1.17 0.70 - 3.10 10*3/mm3    Monocytes Absolute 0.30 0.10 - 0.90 10*3/mm3    Eosinophils Absolute 0.09 0.00 - 0.40 10*3/mm3    Basophils Absolute 0.03 0.00 - 0.20 10*3/mm3    Immature Granulocyte Rel % 0.3 0.0 - 0.5 %    Immature Grans Absolute 0.01 0.00 - 0.05 10*3/mm3    nRBC 0.0 0.0 - 0.2 /100 WBC           Carla was seen today for hypertension and hypothyroidism.    Diagnoses and all orders for this visit:    Medicare annual wellness visit, initial    Acquired hypothyroidism    Prediabetes    Hyperlipidemia, unspecified hyperlipidemia type    Essential hypertension    Coronary artery disease involving native coronary artery of native heart without angina pectoris      Cont current meds.  Encouraged healthy diet/exercise. Ov/labs in 6mo.     Outpatient Medications Prior to Visit   Medication Sig Dispense Refill   • acetaminophen (TYLENOL) 650 MG 8 hr tablet Take 650 mg by mouth Every 8 (Eight) Hours As Needed.     • aspirin 81 MG tablet Take 81 mg by mouth every night.     • B Complex Vitamins (VITAMIN B COMPLEX IJ) Take 1 tablet by mouth 2 (two) times a day.      • carvedilol (COREG) 6.25 MG tablet Take 1 tablet by mouth 2 (Two) Times a Day With Meals. 180 tablet 1   • Cholecalciferol (VITAMIN D-3) 1000 UNITS capsule Take 1,000 Units by mouth every night.     • CO ENZYME Q-10 PO Take 100 mg by mouth 2 (two) times a day.     • fexofenadine (ALLEGRA) 60 MG tablet Take 60 mg by mouth Daily.     • Flaxseed, Linseed, (FLAXSEED OIL) 1200 MG capsule Take 2 capsules by mouth daily.     • Ginkgo Biloba Extract 60 MG capsule Take 1 tablet by mouth every morning.     • Glucosamine-Chondroit-Vit C-Mn (GLUCOSAMINE CHONDR 1500 COMPLX) capsule Take 2 capsules by mouth every morning.     • levothyroxine (SYNTHROID, LEVOTHROID) 50 MCG tablet TAKE ONE TABLET BY MOUTH EVERY MORNING 90 tablet 0   • losartan-hydrochlorothiazide (HYZAAR) 100-25 MG per tablet TAKE ONE TABLET BY MOUTH DAILY 90 tablet 0   • Misc Natural Products (ECHINACEA GOLDENSEAL PLUS) capsule Take 900 mg by mouth every morning.     • Multiple Vitamin (MULTIVITAMINS PO) Take 1 tablet by mouth daily.     • omeprazole (priLOSEC) 20 MG capsule TAKE ONE CAPSULE BY MOUTH EVERY MORNING 90 capsule 1   • ondansetron ODT (ZOFRAN ODT) 8 MG disintegrating tablet Take 1 tablet by mouth Every 8 (Eight) Hours As Needed for Nausea or Vomiting. (Patient taking differently: Take 8 mg by mouth As Needed for Nausea or Vomiting.) 30 tablet 0   • Potassium Gluconate 595 MG capsule Take 595 mcg/day by mouth every morning.     • S-Adenosylmethionine (ALLY-E) 200 MG tablet delayed-release Take  by mouth 2 (Two) Times a Day.     • simvastatin (ZOCOR) 10 MG tablet TAKE ONE TABLET BY MOUTH NIGHTLY 90 tablet 0     No facility-administered medications prior to visit.      No orders of the defined types were placed in this encounter.    [unfilled]  There are no discontinued medications.      Return in about 6 months (around 6/17/2020) for Recheck, labs.

## 2019-12-19 DIAGNOSIS — E78.5 HYPERLIPIDEMIA, UNSPECIFIED HYPERLIPIDEMIA TYPE: ICD-10-CM

## 2019-12-19 RX ORDER — SIMVASTATIN 10 MG
TABLET ORAL
Qty: 90 TABLET | Refills: 0 | Status: SHIPPED | OUTPATIENT
Start: 2019-12-19 | End: 2020-03-23

## 2020-01-07 RX ORDER — OMEPRAZOLE 20 MG/1
CAPSULE, DELAYED RELEASE ORAL
Qty: 90 CAPSULE | Refills: 0 | Status: SHIPPED | OUTPATIENT
Start: 2020-01-07 | End: 2020-04-07

## 2020-01-20 DIAGNOSIS — I10 ESSENTIAL HYPERTENSION: ICD-10-CM

## 2020-01-20 RX ORDER — LOSARTAN POTASSIUM AND HYDROCHLOROTHIAZIDE 25; 100 MG/1; MG/1
TABLET ORAL
Qty: 90 TABLET | Refills: 0 | Status: SHIPPED | OUTPATIENT
Start: 2020-01-20 | End: 2020-04-21

## 2020-02-19 DIAGNOSIS — E03.9 ACQUIRED HYPOTHYROIDISM: ICD-10-CM

## 2020-02-19 RX ORDER — CARVEDILOL 6.25 MG/1
TABLET ORAL
Qty: 180 TABLET | Refills: 0 | Status: SHIPPED | OUTPATIENT
Start: 2020-02-19 | End: 2020-05-19

## 2020-02-19 RX ORDER — LEVOTHYROXINE SODIUM 0.05 MG/1
TABLET ORAL
Qty: 90 TABLET | Refills: 0 | Status: SHIPPED | OUTPATIENT
Start: 2020-02-19 | End: 2020-05-19

## 2020-03-23 DIAGNOSIS — E78.5 HYPERLIPIDEMIA, UNSPECIFIED HYPERLIPIDEMIA TYPE: ICD-10-CM

## 2020-03-23 RX ORDER — SIMVASTATIN 10 MG
TABLET ORAL
Qty: 90 TABLET | Refills: 1 | Status: SHIPPED | OUTPATIENT
Start: 2020-03-23 | End: 2020-09-23

## 2020-04-07 RX ORDER — OMEPRAZOLE 20 MG/1
CAPSULE, DELAYED RELEASE ORAL
Qty: 90 CAPSULE | Refills: 0 | Status: SHIPPED | OUTPATIENT
Start: 2020-04-07 | End: 2020-07-06

## 2020-04-21 DIAGNOSIS — I10 ESSENTIAL HYPERTENSION: ICD-10-CM

## 2020-04-21 RX ORDER — LOSARTAN POTASSIUM AND HYDROCHLOROTHIAZIDE 25; 100 MG/1; MG/1
TABLET ORAL
Qty: 90 TABLET | Refills: 3 | Status: SHIPPED | OUTPATIENT
Start: 2020-04-21 | End: 2021-04-19

## 2020-05-18 DIAGNOSIS — E03.9 ACQUIRED HYPOTHYROIDISM: ICD-10-CM

## 2020-05-19 RX ORDER — CARVEDILOL 6.25 MG/1
TABLET ORAL
Qty: 180 TABLET | Refills: 0 | Status: SHIPPED | OUTPATIENT
Start: 2020-05-19 | End: 2020-08-17

## 2020-05-19 RX ORDER — LEVOTHYROXINE SODIUM 0.05 MG/1
TABLET ORAL
Qty: 90 TABLET | Refills: 0 | Status: SHIPPED | OUTPATIENT
Start: 2020-05-19 | End: 2020-08-17

## 2020-06-16 DIAGNOSIS — I10 ESSENTIAL HYPERTENSION: Primary | ICD-10-CM

## 2020-06-16 DIAGNOSIS — E03.9 ACQUIRED HYPOTHYROIDISM: ICD-10-CM

## 2020-06-16 DIAGNOSIS — R73.03 PREDIABETES: ICD-10-CM

## 2020-06-16 DIAGNOSIS — E78.2 MIXED HYPERLIPIDEMIA: ICD-10-CM

## 2020-07-02 ENCOUNTER — TELEPHONE (OUTPATIENT)
Dept: INTERNAL MEDICINE | Facility: CLINIC | Age: 75
End: 2020-07-02

## 2020-07-02 NOTE — TELEPHONE ENCOUNTER
PATIENT IS REQUESTING A CALL BACK TO SCHEDULE A LAB APPOINTMENT SHE STATED SHE HAD MISSED THE LAST APPOINTMENT AND APOLOGIZES HER  HAD SURGERY AND COMPLETELY FORGOT         GOOD CONTACT NUMBERS  [phone] (H)  [phone]  (M)

## 2020-07-06 RX ORDER — OMEPRAZOLE 20 MG/1
CAPSULE, DELAYED RELEASE ORAL
Qty: 90 CAPSULE | Refills: 0 | Status: SHIPPED | OUTPATIENT
Start: 2020-07-06 | End: 2020-10-05

## 2020-07-22 LAB
ALBUMIN SERPL-MCNC: 4.2 G/DL (ref 3.5–5.2)
ALBUMIN/GLOB SERPL: 2 G/DL
ALP SERPL-CCNC: 56 U/L (ref 39–117)
ALT SERPL-CCNC: 20 U/L (ref 1–33)
AST SERPL-CCNC: 16 U/L (ref 1–32)
BASOPHILS # BLD AUTO: 0.03 10*3/MM3 (ref 0–0.2)
BASOPHILS NFR BLD AUTO: 1 % (ref 0–1.5)
BILIRUB SERPL-MCNC: 0.4 MG/DL (ref 0–1.2)
BUN SERPL-MCNC: 19 MG/DL (ref 8–23)
BUN/CREAT SERPL: 25 (ref 7–25)
CALCIUM SERPL-MCNC: 9.5 MG/DL (ref 8.6–10.5)
CHLORIDE SERPL-SCNC: 104 MMOL/L (ref 98–107)
CHOLEST SERPL-MCNC: 211 MG/DL (ref 0–200)
CO2 SERPL-SCNC: 27.8 MMOL/L (ref 22–29)
CREAT SERPL-MCNC: 0.76 MG/DL (ref 0.57–1)
EOSINOPHIL # BLD AUTO: 0.09 10*3/MM3 (ref 0–0.4)
EOSINOPHIL NFR BLD AUTO: 2.9 % (ref 0.3–6.2)
ERYTHROCYTE [DISTWIDTH] IN BLOOD BY AUTOMATED COUNT: 12.4 % (ref 12.3–15.4)
GLOBULIN SER CALC-MCNC: 2.1 GM/DL
GLUCOSE SERPL-MCNC: 88 MG/DL (ref 65–99)
HBA1C MFR BLD: 5.3 % (ref 4.8–5.6)
HCT VFR BLD AUTO: 37 % (ref 34–46.6)
HDLC SERPL-MCNC: 57 MG/DL (ref 40–60)
HGB BLD-MCNC: 12.4 G/DL (ref 12–15.9)
IMM GRANULOCYTES # BLD AUTO: 0.01 10*3/MM3 (ref 0–0.05)
IMM GRANULOCYTES NFR BLD AUTO: 0.3 % (ref 0–0.5)
LDLC SERPL CALC-MCNC: 119 MG/DL (ref 0–100)
LDLC/HDLC SERPL: 2.09 {RATIO}
LYMPHOCYTES # BLD AUTO: 1.07 10*3/MM3 (ref 0.7–3.1)
LYMPHOCYTES NFR BLD AUTO: 34.9 % (ref 19.6–45.3)
MCH RBC QN AUTO: 33.7 PG (ref 26.6–33)
MCHC RBC AUTO-ENTMCNC: 33.5 G/DL (ref 31.5–35.7)
MCV RBC AUTO: 100.5 FL (ref 79–97)
MONOCYTES # BLD AUTO: 0.3 10*3/MM3 (ref 0.1–0.9)
MONOCYTES NFR BLD AUTO: 9.8 % (ref 5–12)
NEUTROPHILS # BLD AUTO: 1.57 10*3/MM3 (ref 1.7–7)
NEUTROPHILS NFR BLD AUTO: 51.1 % (ref 42.7–76)
NRBC BLD AUTO-RTO: 0 /100 WBC (ref 0–0.2)
PLATELET # BLD AUTO: 229 10*3/MM3 (ref 140–450)
POTASSIUM SERPL-SCNC: 3.9 MMOL/L (ref 3.5–5.2)
PROT SERPL-MCNC: 6.3 G/DL (ref 6–8.5)
RBC # BLD AUTO: 3.68 10*6/MM3 (ref 3.77–5.28)
SODIUM SERPL-SCNC: 141 MMOL/L (ref 136–145)
T4 FREE SERPL-MCNC: 1.29 NG/DL (ref 0.93–1.7)
TRIGL SERPL-MCNC: 175 MG/DL (ref 0–150)
TSH SERPL DL<=0.005 MIU/L-ACNC: 1.16 UIU/ML (ref 0.27–4.2)
VLDLC SERPL CALC-MCNC: 35 MG/DL
WBC # BLD AUTO: 3.07 10*3/MM3 (ref 3.4–10.8)

## 2020-07-24 ENCOUNTER — OFFICE VISIT (OUTPATIENT)
Dept: INTERNAL MEDICINE | Facility: CLINIC | Age: 75
End: 2020-07-24

## 2020-07-24 VITALS
RESPIRATION RATE: 16 BRPM | TEMPERATURE: 97.8 F | HEIGHT: 65 IN | SYSTOLIC BLOOD PRESSURE: 112 MMHG | BODY MASS INDEX: 31.22 KG/M2 | HEART RATE: 90 BPM | DIASTOLIC BLOOD PRESSURE: 78 MMHG | WEIGHT: 187.4 LBS | OXYGEN SATURATION: 91 %

## 2020-07-24 DIAGNOSIS — I10 ESSENTIAL HYPERTENSION: Primary | ICD-10-CM

## 2020-07-24 DIAGNOSIS — E78.5 HYPERLIPIDEMIA, UNSPECIFIED HYPERLIPIDEMIA TYPE: ICD-10-CM

## 2020-07-24 DIAGNOSIS — I25.10 CORONARY ARTERY DISEASE INVOLVING NATIVE CORONARY ARTERY OF NATIVE HEART WITHOUT ANGINA PECTORIS: ICD-10-CM

## 2020-07-24 DIAGNOSIS — E78.2 MIXED HYPERLIPIDEMIA: ICD-10-CM

## 2020-07-24 DIAGNOSIS — E03.9 ACQUIRED HYPOTHYROIDISM: ICD-10-CM

## 2020-07-24 PROCEDURE — 99214 OFFICE O/P EST MOD 30 MIN: CPT | Performed by: INTERNAL MEDICINE

## 2020-07-24 NOTE — PROGRESS NOTES
Carla Serrano is a 75 y.o. female, who presents with a chief complaint of   Chief Complaint   Patient presents with   • 6 month follow up   • Coronary Artery Disease       HPI   Pt here to f/u     CAD - pt s/p CABG 2011.  Last stress test 2016. She denies soa, cp, light, or LE edema.  She follows with dr. Pantoja.      Hypothyroidism - labs normal in April..  No hair/skin changes     HLD - on statin.  No cramps or myalgias     HTN - well controlled.  No ha/dizziness.       GERD - on PPI.  Sx well controlled.      She is trying to eat a healthy diet and stay active.  She was wearing fit bit and tries to get 4-5,000 steps per day but it broke.      S/p Left TKA - 4/2016.  She can't take nsaids bc of GERD so she takes tylenol.      The following portions of the patient's history were reviewed and updated as appropriate: allergies, current medications, past family history, past medical history, past social history, past surgical history and problem list.    Allergies: Patient has no known allergies.    Review of Systems   Constitutional: Negative.    HENT: Negative.    Eyes: Negative.    Respiratory: Negative.    Cardiovascular: Negative.    Gastrointestinal: Negative.    Endocrine: Negative.    Genitourinary: Negative.    Musculoskeletal: Negative.    Skin: Negative.    Allergic/Immunologic: Negative.    Neurological: Negative.    Hematological: Negative.    Psychiatric/Behavioral: Negative.    All other systems reviewed and are negative.            Wt Readings from Last 3 Encounters:   07/24/20 85 kg (187 lb 6.4 oz)   12/17/19 82.6 kg (182 lb)   06/19/19 83.8 kg (184 lb 12.8 oz)     Temp Readings from Last 3 Encounters:   07/24/20 97.8 °F (36.6 °C) (Temporal)   06/19/19 97.6 °F (36.4 °C) (Oral)   04/18/19 97.6 °F (36.4 °C) (Oral)     BP Readings from Last 3 Encounters:   07/24/20 112/78   12/17/19 118/72   06/19/19 122/80     Pulse Readings from Last 3 Encounters:   07/24/20 90   12/17/19 80   06/19/19 84     Body  mass index is 31.18 kg/m².  @LASTSAO2(3)@    Physical Exam   Constitutional: She is oriented to person, place, and time. She appears well-developed and well-nourished. No distress.   HENT:   Head: Normocephalic and atraumatic.   Right Ear: External ear normal.   Left Ear: External ear normal.   Nose: Nose normal.   Mouth/Throat: Oropharynx is clear and moist.   Eyes: Pupils are equal, round, and reactive to light. Conjunctivae and EOM are normal.   Neck: Normal range of motion. Neck supple.   Cardiovascular: Normal rate, regular rhythm, normal heart sounds and intact distal pulses.   Pulmonary/Chest: Effort normal and breath sounds normal. No respiratory distress. She has no wheezes.   Musculoskeletal: Normal range of motion.   Normal gait   Neurological: She is alert and oriented to person, place, and time.   Skin: Skin is warm and dry.   Psychiatric: She has a normal mood and affect. Her behavior is normal. Judgment and thought content normal.   Nursing note and vitals reviewed.      Results for orders placed or performed in visit on 06/16/20   T4, Free   Result Value Ref Range    Free T4 1.29 0.93 - 1.70 ng/dL   TSH   Result Value Ref Range    TSH 1.160 0.270 - 4.200 uIU/mL   Comprehensive Metabolic Panel   Result Value Ref Range    Glucose 88 65 - 99 mg/dL    BUN 19 8 - 23 mg/dL    Creatinine 0.76 0.57 - 1.00 mg/dL    eGFR Non African Am 74 >60 mL/min/1.73    eGFR African Am 90 >60 mL/min/1.73    BUN/Creatinine Ratio 25.0 7.0 - 25.0    Sodium 141 136 - 145 mmol/L    Potassium 3.9 3.5 - 5.2 mmol/L    Chloride 104 98 - 107 mmol/L    Total CO2 27.8 22.0 - 29.0 mmol/L    Calcium 9.5 8.6 - 10.5 mg/dL    Total Protein 6.3 6.0 - 8.5 g/dL    Albumin 4.20 3.50 - 5.20 g/dL    Globulin 2.1 gm/dL    A/G Ratio 2.0 g/dL    Total Bilirubin 0.4 0.0 - 1.2 mg/dL    Alkaline Phosphatase 56 39 - 117 U/L    AST (SGOT) 16 1 - 32 U/L    ALT (SGPT) 20 1 - 33 U/L   Hemoglobin A1c   Result Value Ref Range    Hemoglobin A1C 5.30 4.80 -  5.60 %   Lipid Panel With LDL / HDL Ratio   Result Value Ref Range    Total Cholesterol 211 (H) 0 - 200 mg/dL    Triglycerides 175 (H) 0 - 150 mg/dL    HDL Cholesterol 57 40 - 60 mg/dL    VLDL Cholesterol 35 mg/dL    LDL Cholesterol  119 (H) 0 - 100 mg/dL    LDL/HDL Ratio 2.09    CBC & Differential   Result Value Ref Range    WBC 3.07 (L) 3.40 - 10.80 10*3/mm3    RBC 3.68 (L) 3.77 - 5.28 10*6/mm3    Hemoglobin 12.4 12.0 - 15.9 g/dL    Hematocrit 37.0 34.0 - 46.6 %    .5 (H) 79.0 - 97.0 fL    MCH 33.7 (H) 26.6 - 33.0 pg    MCHC 33.5 31.5 - 35.7 g/dL    RDW 12.4 12.3 - 15.4 %    Platelets 229 140 - 450 10*3/mm3    Neutrophil Rel % 51.1 42.7 - 76.0 %    Lymphocyte Rel % 34.9 19.6 - 45.3 %    Monocyte Rel % 9.8 5.0 - 12.0 %    Eosinophil Rel % 2.9 0.3 - 6.2 %    Basophil Rel % 1.0 0.0 - 1.5 %    Neutrophils Absolute 1.57 (L) 1.70 - 7.00 10*3/mm3    Lymphocytes Absolute 1.07 0.70 - 3.10 10*3/mm3    Monocytes Absolute 0.30 0.10 - 0.90 10*3/mm3    Eosinophils Absolute 0.09 0.00 - 0.40 10*3/mm3    Basophils Absolute 0.03 0.00 - 0.20 10*3/mm3    Immature Granulocyte Rel % 0.3 0.0 - 0.5 %    Immature Grans Absolute 0.01 0.00 - 0.05 10*3/mm3    nRBC 0.0 0.0 - 0.2 /100 WBC           Carla was seen today for 6 month follow up and coronary artery disease.    Diagnoses and all orders for this visit:    Essential hypertension    Mixed hyperlipidemia    Acquired hypothyroidism    Coronary artery disease involving native coronary artery of native heart without angina pectoris    Hyperlipidemia, unspecified hyperlipidemia type      Continue current medications.  Encouraged healthy diet/exercise.  OV labs in 6   months.  Pt to call sooner if any other issues arise.        Outpatient Medications Prior to Visit   Medication Sig Dispense Refill   • acetaminophen (TYLENOL) 650 MG 8 hr tablet Take 650 mg by mouth Every 8 (Eight) Hours As Needed.     • aspirin 81 MG tablet Take 81 mg by mouth every night.     • B Complex Vitamins  (VITAMIN B COMPLEX IJ) Take 1 tablet by mouth 2 (two) times a day.     • carvedilol (COREG) 6.25 MG tablet TAKE ONE TABLET BY MOUTH TWICE A DAY WITH MEALS 180 tablet 0   • Cholecalciferol (VITAMIN D-3) 1000 UNITS capsule Take 1,000 Units by mouth every night.     • CO ENZYME Q-10 PO Take 100 mg by mouth 2 (two) times a day.     • fexofenadine (ALLEGRA) 60 MG tablet Take 60 mg by mouth Daily.     • Flaxseed, Linseed, (FLAXSEED OIL) 1200 MG capsule Take 2 capsules by mouth daily.     • Ginkgo Biloba Extract 60 MG capsule Take 1 tablet by mouth every morning.     • Glucosamine-Chondroit-Vit C-Mn (GLUCOSAMINE CHONDR 1500 COMPLX) capsule Take 2 capsules by mouth every morning.     • levothyroxine (SYNTHROID, LEVOTHROID) 50 MCG tablet TAKE ONE TABLET BY MOUTH EVERY MORNING 90 tablet 0   • losartan-hydrochlorothiazide (HYZAAR) 100-25 MG per tablet TAKE ONE TABLET BY MOUTH DAILY 90 tablet 3   • Misc Natural Products (ECHINACEA GOLDENSEAL PLUS) capsule Take 900 mg by mouth every morning.     • Multiple Vitamin (MULTIVITAMINS PO) Take 1 tablet by mouth daily.     • omeprazole (priLOSEC) 20 MG capsule TAKE ONE CAPSULE BY MOUTH EVERY MORNING 90 capsule 0   • Potassium Gluconate 595 MG capsule Take 595 mcg/day by mouth every morning.     • S-Adenosylmethionine (ALLY-E) 200 MG tablet delayed-release Take  by mouth 2 (Two) Times a Day.     • simvastatin (ZOCOR) 10 MG tablet TAKE ONE TABLET BY MOUTH ONCE NIGHTLY 90 tablet 1   • ondansetron ODT (ZOFRAN ODT) 8 MG disintegrating tablet Take 1 tablet by mouth Every 8 (Eight) Hours As Needed for Nausea or Vomiting. (Patient taking differently: Take 8 mg by mouth As Needed for Nausea or Vomiting.) 30 tablet 0     No facility-administered medications prior to visit.      No orders of the defined types were placed in this encounter.    [unfilled]  Medications Discontinued During This Encounter   Medication Reason   • ondansetron ODT (ZOFRAN ODT) 8 MG disintegrating tablet *Therapy  completed         Return in about 6 months (around 1/24/2021) for Recheck, labs.

## 2020-08-17 DIAGNOSIS — E03.9 ACQUIRED HYPOTHYROIDISM: ICD-10-CM

## 2020-08-17 RX ORDER — LEVOTHYROXINE SODIUM 0.05 MG/1
TABLET ORAL
Qty: 90 TABLET | Refills: 1 | Status: SHIPPED | OUTPATIENT
Start: 2020-08-17 | End: 2021-02-15

## 2020-08-17 RX ORDER — CARVEDILOL 6.25 MG/1
TABLET ORAL
Qty: 180 TABLET | Refills: 1 | Status: SHIPPED | OUTPATIENT
Start: 2020-08-17 | End: 2021-02-12

## 2020-09-22 DIAGNOSIS — E78.5 HYPERLIPIDEMIA, UNSPECIFIED HYPERLIPIDEMIA TYPE: ICD-10-CM

## 2020-09-23 RX ORDER — SIMVASTATIN 10 MG
TABLET ORAL
Qty: 90 TABLET | Refills: 0 | Status: SHIPPED | OUTPATIENT
Start: 2020-09-23 | End: 2020-12-23

## 2020-10-05 RX ORDER — OMEPRAZOLE 20 MG/1
CAPSULE, DELAYED RELEASE ORAL
Qty: 90 CAPSULE | Refills: 0 | Status: SHIPPED | OUTPATIENT
Start: 2020-10-05 | End: 2021-01-05

## 2020-12-21 DIAGNOSIS — E78.5 HYPERLIPIDEMIA, UNSPECIFIED HYPERLIPIDEMIA TYPE: ICD-10-CM

## 2020-12-23 RX ORDER — SIMVASTATIN 10 MG
TABLET ORAL
Qty: 90 TABLET | Refills: 0 | Status: SHIPPED | OUTPATIENT
Start: 2020-12-23 | End: 2021-03-22

## 2021-01-05 RX ORDER — OMEPRAZOLE 20 MG/1
CAPSULE, DELAYED RELEASE ORAL
Qty: 90 CAPSULE | Refills: 3 | Status: SHIPPED | OUTPATIENT
Start: 2021-01-05 | End: 2021-07-20 | Stop reason: SDUPTHER

## 2021-01-15 DIAGNOSIS — E78.5 HYPERLIPIDEMIA, UNSPECIFIED HYPERLIPIDEMIA TYPE: Primary | ICD-10-CM

## 2021-01-15 DIAGNOSIS — I10 ESSENTIAL HYPERTENSION: ICD-10-CM

## 2021-01-15 DIAGNOSIS — R73.03 PREDIABETES: ICD-10-CM

## 2021-01-15 DIAGNOSIS — E03.9 ACQUIRED HYPOTHYROIDISM: ICD-10-CM

## 2021-01-26 ENCOUNTER — TRANSCRIBE ORDERS (OUTPATIENT)
Dept: ADMINISTRATIVE | Facility: HOSPITAL | Age: 76
End: 2021-01-26

## 2021-01-26 ENCOUNTER — OFFICE VISIT (OUTPATIENT)
Dept: INTERNAL MEDICINE | Facility: CLINIC | Age: 76
End: 2021-01-26

## 2021-01-26 VITALS
HEIGHT: 65 IN | TEMPERATURE: 97.3 F | HEART RATE: 71 BPM | WEIGHT: 189 LBS | DIASTOLIC BLOOD PRESSURE: 74 MMHG | SYSTOLIC BLOOD PRESSURE: 110 MMHG | OXYGEN SATURATION: 95 % | BODY MASS INDEX: 31.49 KG/M2 | RESPIRATION RATE: 16 BRPM

## 2021-01-26 DIAGNOSIS — I10 ESSENTIAL HYPERTENSION: ICD-10-CM

## 2021-01-26 DIAGNOSIS — E03.9 ACQUIRED HYPOTHYROIDISM: ICD-10-CM

## 2021-01-26 DIAGNOSIS — E78.2 MIXED HYPERLIPIDEMIA: ICD-10-CM

## 2021-01-26 DIAGNOSIS — Z00.00 ROUTINE GENERAL MEDICAL EXAMINATION AT A HEALTH CARE FACILITY: ICD-10-CM

## 2021-01-26 DIAGNOSIS — Z12.11 COLON CANCER SCREENING: ICD-10-CM

## 2021-01-26 DIAGNOSIS — Z12.31 SCREENING MAMMOGRAM FOR HIGH-RISK PATIENT: Primary | ICD-10-CM

## 2021-01-26 DIAGNOSIS — R73.03 PREDIABETES: ICD-10-CM

## 2021-01-26 DIAGNOSIS — Z12.31 BREAST CANCER SCREENING BY MAMMOGRAM: ICD-10-CM

## 2021-01-26 DIAGNOSIS — H91.90 HEARING LOSS, UNSPECIFIED HEARING LOSS TYPE, UNSPECIFIED LATERALITY: ICD-10-CM

## 2021-01-26 DIAGNOSIS — Z78.0 POST-MENOPAUSAL: ICD-10-CM

## 2021-01-26 DIAGNOSIS — H26.9 CATARACT, UNSPECIFIED CATARACT TYPE, UNSPECIFIED LATERALITY: ICD-10-CM

## 2021-01-26 DIAGNOSIS — Z00.00 MEDICARE ANNUAL WELLNESS VISIT, SUBSEQUENT: Primary | ICD-10-CM

## 2021-01-26 DIAGNOSIS — I25.10 CORONARY ARTERY DISEASE INVOLVING NATIVE CORONARY ARTERY OF NATIVE HEART WITHOUT ANGINA PECTORIS: ICD-10-CM

## 2021-01-26 PROCEDURE — 99214 OFFICE O/P EST MOD 30 MIN: CPT | Performed by: INTERNAL MEDICINE

## 2021-01-26 PROCEDURE — G0439 PPPS, SUBSEQ VISIT: HCPCS | Performed by: INTERNAL MEDICINE

## 2021-01-26 NOTE — PATIENT INSTRUCTIONS
Medicare Wellness  Personal Prevention Plan of Service     Date of Office Visit:  2021  Encounter Provider:  Hilda Loera MD  Place of Service:  [facility]  Patient Name: Carla Serrano  :  1945    As part of the Medicare Wellness portion of your visit today, we are providing you with this personalized preventive plan of services (PPPS). This plan is based upon recommendations of the [place] Preventive Services Task Force (USPSTF) and the Advisory Committee on Immunization Practices (ACIP).    This lists the preventive care services that should be considered, and provides dates of when you are due. Items listed as completed are up-to-date and do not require any further intervention.    Health Maintenance   Topic Date Due   • COLOGUARD  10/11/2020   • ZOSTER VACCINE (1 of 2) 2022 (Originally 4/15/1996)   • MAMMOGRAM  2021   • DXA SCAN  2021   • LIPID PANEL  2022   • ANNUAL WELLNESS VISIT  2022   • TDAP/TD VACCINES (2 - Td) 2026   • HEPATITIS C SCREENING  Completed   • INFLUENZA VACCINE  Completed   • Pneumococcal Vaccine 65+  Completed   • MENINGOCOCCAL VACCINE  Aged Out       Orders Placed This Encounter   Procedures   • Mammo Screening Bilateral With CAD     Standing Status:   Future     Standing Expiration Date:   2022   • DEXA Bone Density Axial     Standing Status:   Future     Standing Expiration Date:   2022   • Cologuard - Stool, Per Rectum     Standing Status:   Future     Standing Expiration Date:   2022   • Ambulatory Referral to Ophthalmology     Referral Priority:   Routine     Referral Type:   Consultation     Referral Reason:   Specialty Services Required     Requested Specialty:   Ophthalmology     Number of Visits Requested:   1   • Ambulatory Referral to Audiology     Referral Priority:   Routine     Referral Type:   Consultation     Referral Reason:   Specialty Services Required      Requested Specialty:   Audiology     Number of Visits Requested:   1       Return in about 6 months (around 7/26/2021) for Recheck, well exam.

## 2021-01-26 NOTE — PROGRESS NOTES
"Chief Complaint  6 month follow up, Hypertension, and Medicare Wellness-subsequent    Subjective          Carla Serrano presents to [facility] for   History of Present Illness  Pt here to f/u and to have medicare sub exam.      CAD - pt s/p CABG 2011.  Last stress test 2016. She denies soa, cp, light, or LE edema.  She follows with dr. Pantoja.      Hypothyroidism - labs normal.No hair/skin changes     HLD - on statin.  No cramps or myalgias     HTN - well controlled.  No ha/dizziness.       GERD - on PPI.  She occ takes her nexium a second time during the day.  We discussed that if she cont to have break through sx she should f/u with gi for egd.     She is trying to eat a healthy diet and stay active.  She was wearing fit bit and tries to get 4-5,000 steps per day but it broke.      S/p Left TKA - 4/2016.  She can't take nsaids bc of GERD so she takes tylenol.       Objective   Vital Signs:   /74 (BP Location: Left arm, Patient Position: Sitting, Cuff Size: Large Adult)   Pulse 71   Temp 97.3 °F (36.3 °C) (Temporal)   Resp 16   Ht 165.1 cm (65\")   Wt 85.7 kg (189 lb)   SpO2 95%   BMI 31.45 kg/m²     Physical Exam  Vitals signs and nursing note reviewed.   Constitutional:       General: She is not in acute distress.     Appearance: She is well-developed.   HENT:      Head: Normocephalic and atraumatic.      Right Ear: External ear normal.      Left Ear: External ear normal.      Nose: Nose normal.   Eyes:      Conjunctiva/sclera: Conjunctivae normal.      Pupils: Pupils are equal, round, and reactive to light.   Neck:      Musculoskeletal: Normal range of motion and neck supple.   Cardiovascular:      Rate and Rhythm: Normal rate and regular rhythm.      Heart sounds: Normal heart sounds.   Pulmonary:      Effort: Pulmonary effort is normal. No respiratory distress.      Breath sounds: Normal breath sounds. No wheezing.   Musculoskeletal: Normal range of motion.      " Comments: Normal gait   Skin:     General: Skin is warm and dry.   Neurological:      Mental Status: She is alert and oriented to person, place, and time.   Psychiatric:         Behavior: Behavior normal.         Thought Content: Thought content normal.         Judgment: Judgment normal.        Result Review :   The following data was reviewed by: Hilda Loera MD on 01/26/2021:  Common labs    Common Labsle 7/21/20 7/21/20 7/21/20 7/21/20 1/19/21 1/19/21 1/19/21 1/19/21    0924 0924 0924 0924 0900 0900 0900 0900   Glucose  88     89    BUN  19     19    Creatinine  0.76     0.72    eGFR Non  Am  74     79    eGFR  Am  90     96    Sodium  141     142    Potassium  3.9     3.9    Chloride  104     103    Calcium  9.5     9.7    Total Protein  6.3     6.4    Albumin  4.20     4.30    Total Bilirubin  0.4     0.4    Alkaline Phosphatase  56     64    AST (SGOT)  16     18    ALT (SGPT)  20     19    WBC 3.07 (A)    3.38 (A)      Hemoglobin 12.4    13.1      Hematocrit 37.0    38.4      Platelets 229    228      Total Cholesterol    211 (A)    217 (A)   Triglycerides    175 (A)    162 (A)   HDL Cholesterol    57    58   LDL Cholesterol     119 (A)    130 (A)   Hemoglobin A1C   5.30   5.40     (A) Abnormal value       Comments are available for some flowsheets but are not being displayed.                     Assessment and Plan    Problem List Items Addressed This Visit        Cardiac and Vasculature    CAD (coronary artery disease)    Overview     h/o CABG x3         Hypertension    Hyperlipidemia       Endocrine and Metabolic    Hypothyroidism    Prediabetes       Gastrointestinal Abdominal     Colon cancer screening    Relevant Orders    Cologuard - Stool, Per Rectum      Other Visit Diagnoses     Medicare annual wellness visit, subsequent    -  Primary    Relevant Orders    Mammo Screening Bilateral With CAD    DEXA Bone Density Axial    Cologuard - Stool, Per Rectum    Cataract, unspecified  cataract type, unspecified laterality        Relevant Orders    Ambulatory Referral to Ophthalmology    Post-menopausal        Relevant Orders    DEXA Bone Density Axial    Breast cancer screening by mammogram        Relevant Orders    Mammo Screening Bilateral With CAD    Hearing loss, unspecified hearing loss type, unspecified laterality        Relevant Orders    Ambulatory Referral to Audiology      Continue current medications.  Encouraged healthy diet/exercise.  OV labs in  6  months.  Pt to call sooner if any other issues arise.        Follow Up   Return in about 6 months (around 7/26/2021) for Recheck, well exam.  Patient was given instructions and counseling regarding her condition or for health maintenance advice. Please see specific information pulled into the AVS if appropriate.

## 2021-01-26 NOTE — PROGRESS NOTES
The ABCs of the Annual Wellness Visit  Subsequent Medicare Wellness Visit    Chief Complaint   Patient presents with   • 6 month follow up   • Hypertension   • Medicare Wellness-subsequent       Subjective   History of Present Illness:  Carla Serrano is a 75 y.o. female who presents for a Subsequent Medicare Wellness Visit.    HEALTH RISK ASSESSMENT    Recent Hospitalizations:  No hospitalization(s) within the last year.    Current Medical Providers:  Patient Care Team:  Hilda Loera MD as PCP - General (Internal Medicine & Pediatrics)    Smoking Status:  Social History     Tobacco Use   Smoking Status Never Smoker   Smokeless Tobacco Never Used       Alcohol Consumption:  Social History     Substance and Sexual Activity   Alcohol Use No       Depression Screen:   PHQ-2/PHQ-9 Depression Screening 1/26/2021   Little interest or pleasure in doing things 0   Feeling down, depressed, or hopeless 0   Trouble falling or staying asleep, or sleeping too much -   Feeling tired or having little energy -   Poor appetite or overeating -   Feeling bad about yourself - or that you are a failure or have let yourself or your family down -   Trouble concentrating on things, such as reading the newspaper or watching television -   Moving or speaking so slowly that other people could have noticed. Or the opposite - being so fidgety or restless that you have been moving around a lot more than usual -   Thoughts that you would be better off dead, or of hurting yourself in some way -   Total Score 0   If you checked off any problems, how difficult have these problems made it for you to do your work, take care of things at home, or get along with other people? -       Fall Risk Screen:  STEADI Fall Risk Assessment was completed, and patient is at LOW risk for falls.Assessment completed on:1/26/2021    Health Habits and Functional and Cognitive Screening:  Functional & Cognitive Status 1/26/2021   Do you have difficulty  preparing food and eating? No   Do you have difficulty bathing yourself, getting dressed or grooming yourself? No   Do you have difficulty using the toilet? No   Do you have difficulty moving around from place to place? No   Do you have trouble with steps or getting out of a bed or a chair? No   Current Diet Well Balanced Diet   Dental Exam Up to date   Eye Exam Up to date   Exercise (times per week) 5 times per week   Current Exercises Include Walking   Current Exercise Activities Include -   Do you need help using the phone?  No   Are you deaf or do you have serious difficulty hearing?  No   Do you need help with transportation? No   Do you need help shopping? No   Do you need help preparing meals?  No   Do you need help with housework?  No   Do you need help with laundry? No   Do you need help taking your medications? No   Do you need help managing money? No   Do you ever drive or ride in a car without wearing a seat belt? No   Have you felt unusual stress, anger or loneliness in the last month? No   Who do you live with? Spouse   If you need help, do you have trouble finding someone available to you? No   Have you been bothered in the last four weeks by sexual problems? No   Do you have difficulty concentrating, remembering or making decisions? No         Does the patient have evidence of cognitive impairment? No    Asprin use counseling:Contraindicated from taking ASA    Age-appropriate Screening Schedule:  Refer to the list below for future screening recommendations based on patient's age, sex and/or medical conditions. Orders for these recommended tests are listed in the plan section. The patient has been provided with a written plan.    Health Maintenance   Topic Date Due   • ZOSTER VACCINE (1 of 2) 02/04/2022 (Originally 4/[birth year]/1996)   • MAMMOGRAM  07/08/2021   • DXA SCAN  07/08/2021   • LIPID PANEL  01/19/2022   • TDAP/TD VACCINES (2 - Td) 02/23/2026   • INFLUENZA VACCINE  Completed          The following  portions of the patient's history were reviewed and updated as appropriate: allergies, current medications, past family history, past medical history, past social history, past surgical history and problem list.    Outpatient Medications Prior to Visit   Medication Sig Dispense Refill   • acetaminophen (TYLENOL) 650 MG 8 hr tablet Take 650 mg by mouth Every 8 (Eight) Hours As Needed.     • aspirin 81 MG tablet Take 81 mg by mouth every night.     • B Complex Vitamins (VITAMIN B COMPLEX IJ) Take 1 tablet by mouth 2 (two) times a day.     • carvedilol (COREG) 6.25 MG tablet TAKE ONE TABLET BY MOUTH TWICE A DAY WITH MEALS 180 tablet 1   • Cholecalciferol (VITAMIN D-3) 1000 UNITS capsule Take 1,000 Units by mouth every night.     • CO ENZYME Q-10 PO Take 100 mg by mouth 2 (two) times a day.     • fexofenadine (ALLEGRA) 60 MG tablet Take 60 mg by mouth Daily.     • Flaxseed, Linseed, (FLAXSEED OIL) 1200 MG capsule Take 2 capsules by mouth daily.     • Ginkgo Biloba Extract 60 MG capsule Take 1 tablet by mouth every morning.     • Glucosamine-Chondroit-Vit C-Mn (GLUCOSAMINE CHONDR 1500 COMPLX) capsule Take 2 capsules by mouth every morning.     • levothyroxine (SYNTHROID, LEVOTHROID) 50 MCG tablet TAKE ONE TABLET BY MOUTH EVERY MORNING 90 tablet 1   • losartan-hydrochlorothiazide (HYZAAR) 100-25 MG per tablet TAKE ONE TABLET BY MOUTH DAILY 90 tablet 3   • Misc Natural Products (ECHINACEA GOLDENSEAL PLUS) capsule Take 900 mg by mouth every morning.     • Multiple Vitamin (MULTIVITAMINS PO) Take 1 tablet by mouth daily.     • omeprazole (priLOSEC) 20 MG capsule TAKE ONE CAPSULE BY MOUTH EVERY MORNING 90 capsule 3   • Potassium Gluconate 595 MG capsule Take 595 mcg/day by mouth every morning.     • S-Adenosylmethionine (ALLY-E) 200 MG tablet delayed-release Take  by mouth 2 (Two) Times a Day.     • simvastatin (ZOCOR) 10 MG tablet TAKE ONE TABLET BY MOUTH ONCE NIGHTLY 90 tablet 0     No facility-administered medications prior  "to visit.        Patient Active Problem List   Diagnosis   • Edema   • DJD (degenerative joint disease) of knee   • CAD (coronary artery disease)   • SVT (supraventricular tachycardia) (CMS/[facility])   • Hypertension   • JOVANNA (obstructive sleep apnea)   • Hyperlipidemia   • Hypothyroidism   • Prediabetes   • Incontinence in female   • Colon cancer screening   • Vomiting   • High risk medication use   • Reflux esophagitis   • Depression       Advanced Care Planning:  ACP discussion was held with the patient during this visit. Patient has an advance directive in EMR which is still valid.     Review of Systems    Compared to one year ago, the patient feels her physical health is the same.  Compared to one year ago, the patient feels her mental health is the same.    Reviewed chart for potential of high risk medication in the elderly: yes  Reviewed chart for potential of harmful drug interactions in the elderly:yes    Objective         Vitals:    01/26/21 0955   BP: 110/74   BP Location: Left arm   Patient Position: Sitting   Cuff Size: Large Adult   Pulse: 71   Resp: 16   Temp: 97.3 °F (36.3 °C)   TempSrc: Temporal   SpO2: 95%   Weight: 85.7 kg (189 lb)   Height: 165.1 cm (65\")       Body mass index is 31.45 kg/m².  Discussed the patient's BMI with her. The BMI is in the acceptable range.    Physical Exam    Lab Results   Component Value Date    GLU 89 01/19/2021    CHLPL 217 (H) 01/19/2021    TRIG 162 (H) 01/19/2021    HDL 58 01/19/2021     (H) 01/19/2021    VLDL 29 01/19/2021    HGBA1C 5.40 01/19/2021        Assessment/Plan   Medicare Risks and Personalized Health Plan  CMS Preventative Services Quick Reference  Advance Directive Discussion  Breast Cancer/Mammogram Screening  Colon Cancer Screening  Diabetic Lab Screening   Fall Risk  Immunizations Discussed/Encouraged (specific immunizations; Shingrix )  Obesity/Overweight   Polypharmacy    The above risks/problems have been discussed with the patient.  Pertinent " information has been shared with the patient in the After Visit Summary.  Follow up plans and orders are seen below in the Assessment/Plan Section.    Diagnoses and all orders for this visit:    1. Medicare annual wellness visit, subsequent (Primary)  -     Mammo Screening Bilateral With CAD; Future  -     DEXA Bone Density Axial; Future  -     Cologuard - Stool, Per Rectum; Future    2. Essential hypertension    3. Mixed hyperlipidemia    4. Prediabetes    5. Coronary artery disease involving native coronary artery of native heart without angina pectoris    6. Acquired hypothyroidism    7. Cataract, unspecified cataract type, unspecified laterality  -     Ambulatory Referral to Ophthalmology    8. Colon cancer screening  -     Cologuard - Stool, Per Rectum; Future    9. Post-menopausal  -     DEXA Bone Density Axial; Future    10. Breast cancer screening by mammogram  -     Mammo Screening Bilateral With CAD; Future    11. Hearing loss, unspecified hearing loss type, unspecified laterality  -     Ambulatory Referral to Audiology      Follow Up:  Return in about 6 months (around 7/26/2021) for Recheck, well exam.     An After Visit Summary and PPPS were given to the patient.

## 2021-02-04 ENCOUNTER — HOSPITAL ENCOUNTER (OUTPATIENT)
Dept: MAMMOGRAPHY | Facility: HOSPITAL | Age: 76
Discharge: HOME | End: 2021-02-04

## 2021-02-04 ENCOUNTER — APPOINTMENT (OUTPATIENT)
Dept: BONE DENSITY | Facility: HOSPITAL | Age: 76
End: 2021-02-04

## 2021-02-04 DIAGNOSIS — Z78.0 POST-MENOPAUSAL: ICD-10-CM

## 2021-02-04 DIAGNOSIS — Z12.31 SCREENING MAMMOGRAM FOR HIGH-RISK PATIENT: ICD-10-CM

## 2021-02-04 DIAGNOSIS — Z00.00 ROUTINE GENERAL MEDICAL EXAMINATION AT A HEALTH CARE FACILITY: ICD-10-CM

## 2021-02-04 DIAGNOSIS — Z00.00 MEDICARE ANNUAL WELLNESS VISIT, SUBSEQUENT: ICD-10-CM

## 2021-02-04 PROCEDURE — 77067 SCR MAMMO BI INCL CAD: CPT

## 2021-02-04 PROCEDURE — 77063 BREAST TOMOSYNTHESIS BI: CPT

## 2021-02-04 PROCEDURE — 77080 DXA BONE DENSITY AXIAL: CPT

## 2021-02-12 RX ORDER — CARVEDILOL 6.25 MG/1
TABLET ORAL
Qty: 180 TABLET | Refills: 3 | Status: SHIPPED | OUTPATIENT
Start: 2021-02-12 | End: 2022-02-14

## 2021-02-14 DIAGNOSIS — E03.9 ACQUIRED HYPOTHYROIDISM: ICD-10-CM

## 2021-02-15 RX ORDER — LEVOTHYROXINE SODIUM 0.05 MG/1
TABLET ORAL
Qty: 90 TABLET | Refills: 1 | Status: SHIPPED | OUTPATIENT
Start: 2021-02-15 | End: 2021-08-10

## 2021-03-22 DIAGNOSIS — E78.5 HYPERLIPIDEMIA, UNSPECIFIED HYPERLIPIDEMIA TYPE: ICD-10-CM

## 2021-03-22 RX ORDER — SIMVASTATIN 10 MG
TABLET ORAL
Qty: 90 TABLET | Refills: 0 | Status: SHIPPED | OUTPATIENT
Start: 2021-03-22 | End: 2021-06-14

## 2021-04-19 DIAGNOSIS — I10 ESSENTIAL HYPERTENSION: ICD-10-CM

## 2021-04-19 RX ORDER — LOSARTAN POTASSIUM AND HYDROCHLOROTHIAZIDE 25; 100 MG/1; MG/1
TABLET ORAL
Qty: 90 TABLET | Refills: 2 | Status: SHIPPED | OUTPATIENT
Start: 2021-04-19 | End: 2022-01-13

## 2021-06-13 DIAGNOSIS — E78.5 HYPERLIPIDEMIA, UNSPECIFIED HYPERLIPIDEMIA TYPE: ICD-10-CM

## 2021-06-14 RX ORDER — SIMVASTATIN 10 MG
TABLET ORAL
Qty: 90 TABLET | Refills: 0 | Status: SHIPPED | OUTPATIENT
Start: 2021-06-14 | End: 2021-06-21

## 2021-06-17 DIAGNOSIS — E78.5 HYPERLIPIDEMIA, UNSPECIFIED HYPERLIPIDEMIA TYPE: ICD-10-CM

## 2021-06-21 RX ORDER — SIMVASTATIN 10 MG
TABLET ORAL
Qty: 90 TABLET | Refills: 0 | Status: SHIPPED | OUTPATIENT
Start: 2021-06-21 | End: 2021-12-20

## 2021-07-12 ENCOUNTER — TELEPHONE (OUTPATIENT)
Dept: INTERNAL MEDICINE | Facility: CLINIC | Age: 76
End: 2021-07-12

## 2021-07-12 DIAGNOSIS — E03.9 ACQUIRED HYPOTHYROIDISM: ICD-10-CM

## 2021-07-12 DIAGNOSIS — R73.03 PREDIABETES: ICD-10-CM

## 2021-07-12 DIAGNOSIS — I10 ESSENTIAL HYPERTENSION: Primary | ICD-10-CM

## 2021-07-12 DIAGNOSIS — E78.2 MIXED HYPERLIPIDEMIA: ICD-10-CM

## 2021-07-12 DIAGNOSIS — E78.5 HYPERLIPIDEMIA, UNSPECIFIED HYPERLIPIDEMIA TYPE: ICD-10-CM

## 2021-07-13 DIAGNOSIS — E78.2 MIXED HYPERLIPIDEMIA: ICD-10-CM

## 2021-07-13 DIAGNOSIS — R73.03 PREDIABETES: ICD-10-CM

## 2021-07-13 DIAGNOSIS — E03.9 ACQUIRED HYPOTHYROIDISM: ICD-10-CM

## 2021-07-13 DIAGNOSIS — I10 ESSENTIAL HYPERTENSION: ICD-10-CM

## 2021-07-14 LAB
ALBUMIN SERPL-MCNC: 4.3 G/DL (ref 3.5–5.2)
ALBUMIN/GLOB SERPL: 2 G/DL
ALP SERPL-CCNC: 60 U/L (ref 39–117)
ALT SERPL-CCNC: 19 U/L (ref 1–33)
AST SERPL-CCNC: 17 U/L (ref 1–32)
BASOPHILS # BLD AUTO: 0.02 10*3/MM3 (ref 0–0.2)
BASOPHILS NFR BLD AUTO: 0.6 % (ref 0–1.5)
BILIRUB SERPL-MCNC: 0.5 MG/DL (ref 0–1.2)
BUN SERPL-MCNC: 16 MG/DL (ref 8–23)
BUN/CREAT SERPL: 22.2 (ref 7–25)
CALCIUM SERPL-MCNC: 10.1 MG/DL (ref 8.6–10.5)
CHLORIDE SERPL-SCNC: 103 MMOL/L (ref 98–107)
CHOLEST SERPL-MCNC: 211 MG/DL (ref 0–200)
CHOLEST/HDLC SERPL: 3.64 {RATIO}
CO2 SERPL-SCNC: 28.5 MMOL/L (ref 22–29)
CREAT SERPL-MCNC: 0.72 MG/DL (ref 0.57–1)
EOSINOPHIL # BLD AUTO: 0.09 10*3/MM3 (ref 0–0.4)
EOSINOPHIL NFR BLD AUTO: 2.8 % (ref 0.3–6.2)
ERYTHROCYTE [DISTWIDTH] IN BLOOD BY AUTOMATED COUNT: 12.2 % (ref 12.3–15.4)
GLOBULIN SER CALC-MCNC: 2.1 GM/DL
GLUCOSE SERPL-MCNC: 86 MG/DL (ref 65–99)
HBA1C MFR BLD: 5.2 % (ref 4.8–5.6)
HCT VFR BLD AUTO: 40 % (ref 34–46.6)
HDLC SERPL-MCNC: 58 MG/DL (ref 40–60)
HGB BLD-MCNC: 13.3 G/DL (ref 12–15.9)
IMM GRANULOCYTES # BLD AUTO: 0.01 10*3/MM3 (ref 0–0.05)
IMM GRANULOCYTES NFR BLD AUTO: 0.3 % (ref 0–0.5)
LDLC SERPL CALC-MCNC: 130 MG/DL (ref 0–100)
LYMPHOCYTES # BLD AUTO: 1.09 10*3/MM3 (ref 0.7–3.1)
LYMPHOCYTES NFR BLD AUTO: 33.4 % (ref 19.6–45.3)
MCH RBC QN AUTO: 33.4 PG (ref 26.6–33)
MCHC RBC AUTO-ENTMCNC: 33.3 G/DL (ref 31.5–35.7)
MCV RBC AUTO: 100.5 FL (ref 79–97)
MONOCYTES # BLD AUTO: 0.4 10*3/MM3 (ref 0.1–0.9)
MONOCYTES NFR BLD AUTO: 12.3 % (ref 5–12)
NEUTROPHILS # BLD AUTO: 1.65 10*3/MM3 (ref 1.7–7)
NEUTROPHILS NFR BLD AUTO: 50.6 % (ref 42.7–76)
NRBC BLD AUTO-RTO: 0 /100 WBC (ref 0–0.2)
PLATELET # BLD AUTO: 241 10*3/MM3 (ref 140–450)
POTASSIUM SERPL-SCNC: 4.1 MMOL/L (ref 3.5–5.2)
PROT SERPL-MCNC: 6.4 G/DL (ref 6–8.5)
RBC # BLD AUTO: 3.98 10*6/MM3 (ref 3.77–5.28)
SODIUM SERPL-SCNC: 143 MMOL/L (ref 136–145)
T4 FREE SERPL-MCNC: 1.27 NG/DL (ref 0.93–1.7)
TRIGL SERPL-MCNC: 127 MG/DL (ref 0–150)
TSH SERPL DL<=0.005 MIU/L-ACNC: 1.23 UIU/ML (ref 0.27–4.2)
VLDLC SERPL CALC-MCNC: 23 MG/DL (ref 5–40)
WBC # BLD AUTO: 3.26 10*3/MM3 (ref 3.4–10.8)

## 2021-07-20 ENCOUNTER — OFFICE VISIT (OUTPATIENT)
Dept: INTERNAL MEDICINE | Facility: CLINIC | Age: 76
End: 2021-07-20

## 2021-07-20 VITALS
SYSTOLIC BLOOD PRESSURE: 130 MMHG | BODY MASS INDEX: 31.59 KG/M2 | HEIGHT: 65 IN | WEIGHT: 189.6 LBS | OXYGEN SATURATION: 93 % | RESPIRATION RATE: 19 BRPM | DIASTOLIC BLOOD PRESSURE: 72 MMHG | TEMPERATURE: 96.2 F | HEART RATE: 96 BPM

## 2021-07-20 DIAGNOSIS — I10 ESSENTIAL HYPERTENSION: ICD-10-CM

## 2021-07-20 DIAGNOSIS — E03.9 ACQUIRED HYPOTHYROIDISM: ICD-10-CM

## 2021-07-20 DIAGNOSIS — F43.9 STRESS AT HOME: Primary | ICD-10-CM

## 2021-07-20 DIAGNOSIS — R73.03 PREDIABETES: ICD-10-CM

## 2021-07-20 DIAGNOSIS — Z12.11 COLON CANCER SCREENING: ICD-10-CM

## 2021-07-20 DIAGNOSIS — K21.9 GASTROESOPHAGEAL REFLUX DISEASE, UNSPECIFIED WHETHER ESOPHAGITIS PRESENT: ICD-10-CM

## 2021-07-20 DIAGNOSIS — E78.2 MIXED HYPERLIPIDEMIA: ICD-10-CM

## 2021-07-20 PROCEDURE — 99214 OFFICE O/P EST MOD 30 MIN: CPT | Performed by: INTERNAL MEDICINE

## 2021-07-20 RX ORDER — OMEPRAZOLE 40 MG/1
40 CAPSULE, DELAYED RELEASE ORAL 2 TIMES DAILY
Qty: 180 CAPSULE | Refills: 1 | Status: SHIPPED | OUTPATIENT
Start: 2021-07-20 | End: 2022-01-13

## 2021-07-20 RX ORDER — SERTRALINE HYDROCHLORIDE 25 MG/1
25 TABLET, FILM COATED ORAL DAILY
Qty: 30 TABLET | Refills: 0 | Status: SHIPPED | OUTPATIENT
Start: 2021-07-20 | End: 2021-08-17 | Stop reason: SDUPTHER

## 2021-07-20 NOTE — PROGRESS NOTES
"      Carla Serrano is a 76 y.o. female, who presents with a chief complaint of   Chief Complaint   Patient presents with   • Medicare Wellness-subsequent           HPI   Pt here to f/u     She has had more stress lately.  She feels like she would like to take another medication 1-2 times a week to help with anxiety/stress.  She wakes up most mornings around 2am then it takes her about 2 hours to get back to sleep.  She takes an otc sleep supplement (grant-e 200mg/day).       CAD - pt s/p CABG 2011.  Last stress test 2016. She denies soa, cp, light, or LE edema.  She follows with dr. Pantoja.      Hypothyroidism - labs normal.No hair/skin changes     HLD - on statin.  No cramps or myalgias     HTN - well controlled.  No ha/dizziness.       GERD - on PPI.  she does well in the am but by lunch she starts to feel a \"hardness\" in her stomach.  she tries to limit onions, spice, and garlic bc these flare sx.  She occ takes her omeprazole a second time during the evening.  We discussed that if she cont to have break through sx she should f/u with gi for egd. She has a kit at home for cologuard but hasn't sent it back in.  She had a colonoscopy years ago and didn't have a good experience.     She is trying to eat a healthy diet and stay active.  she hasnt been walking quite as much during the summer     S/p Left TKA - 4/2016.  She can't take nsaids bc of GERD so she takes tylenol.      The following portions of the patient's history were reviewed and updated as appropriate: allergies, current medications, past family history, past medical history, past social history, past surgical history and problem list.    Allergies: Latex    Review of Systems   Constitutional: Negative.    HENT: Negative.    Eyes: Negative.    Respiratory: Negative.    Cardiovascular: Negative.    Gastrointestinal: Negative.    Endocrine: Negative.    Genitourinary: Negative.    Musculoskeletal: Positive for arthralgias.   Skin: Negative.  "   Allergic/Immunologic: Negative.    Neurological: Negative.    Hematological: Negative.    Psychiatric/Behavioral: Positive for sleep disturbance. Negative for self-injury and suicidal ideas. The patient is nervous/anxious.    All other systems reviewed and are negative.            Wt Readings from Last 3 Encounters:   07/20/21 86 kg (189 lb 9.6 oz)   01/26/21 85.7 kg (189 lb)   07/24/20 85 kg (187 lb 6.4 oz)     Temp Readings from Last 3 Encounters:   07/20/21 96.2 °F (35.7 °C) (Temporal)   01/26/21 97.3 °F (36.3 °C) (Temporal)   07/24/20 97.8 °F (36.6 °C) (Temporal)     BP Readings from Last 3 Encounters:   07/20/21 130/72   01/26/21 110/74   07/24/20 112/78     Pulse Readings from Last 3 Encounters:   07/20/21 96   01/26/21 71   07/24/20 90     Body mass index is 31.55 kg/m².  SpO2 Readings from Last 3 Encounters:   07/20/21 93%   01/26/21 95%   07/24/20 91%          Physical Exam  Vitals and nursing note reviewed.   Constitutional:       General: She is not in acute distress.     Appearance: She is well-developed.   HENT:      Head: Normocephalic and atraumatic.      Right Ear: External ear normal.      Left Ear: External ear normal.      Nose: Nose normal.   Eyes:      Conjunctiva/sclera: Conjunctivae normal.      Pupils: Pupils are equal, round, and reactive to light.   Cardiovascular:      Rate and Rhythm: Normal rate and regular rhythm.      Heart sounds: Normal heart sounds.   Pulmonary:      Effort: Pulmonary effort is normal. No respiratory distress.      Breath sounds: Normal breath sounds. No wheezing.   Musculoskeletal:         General: Normal range of motion.      Cervical back: Normal range of motion and neck supple.      Comments: Normal gait   Skin:     General: Skin is warm and dry.   Neurological:      General: No focal deficit present.      Mental Status: She is alert and oriented to person, place, and time.   Psychiatric:         Mood and Affect: Mood normal.         Behavior: Behavior normal.          Thought Content: Thought content normal.         Judgment: Judgment normal.         Results for orders placed or performed in visit on 07/13/21   TSH    Specimen: Blood   Result Value Ref Range    TSH 1.230 0.270 - 4.200 uIU/mL   T4, Free    Specimen: Blood   Result Value Ref Range    Free T4 1.27 0.93 - 1.70 ng/dL   Comprehensive Metabolic Panel    Specimen: Blood   Result Value Ref Range    Glucose 86 65 - 99 mg/dL    BUN 16 8 - 23 mg/dL    Creatinine 0.72 0.57 - 1.00 mg/dL    eGFR Non African Am 79 >60 mL/min/1.73    eGFR African Am 96 >60 mL/min/1.73    BUN/Creatinine Ratio 22.2 7.0 - 25.0    Sodium 143 136 - 145 mmol/L    Potassium 4.1 3.5 - 5.2 mmol/L    Chloride 103 98 - 107 mmol/L    Total CO2 28.5 22.0 - 29.0 mmol/L    Calcium 10.1 8.6 - 10.5 mg/dL    Total Protein 6.4 6.0 - 8.5 g/dL    Albumin 4.30 3.50 - 5.20 g/dL    Globulin 2.1 gm/dL    A/G Ratio 2.0 g/dL    Total Bilirubin 0.5 0.0 - 1.2 mg/dL    Alkaline Phosphatase 60 39 - 117 U/L    AST (SGOT) 17 1 - 32 U/L    ALT (SGPT) 19 1 - 33 U/L   Hemoglobin A1c    Specimen: Blood   Result Value Ref Range    Hemoglobin A1C 5.20 4.80 - 5.60 %   Lipid Panel With / Chol / HDL Ratio    Specimen: Blood   Result Value Ref Range    Total Cholesterol 211 (H) 0 - 200 mg/dL    Triglycerides 127 0 - 150 mg/dL    HDL Cholesterol 58 40 - 60 mg/dL    VLDL Cholesterol Jose Juan 23 5 - 40 mg/dL    LDL Chol Calc (NIH) 130 (H) 0 - 100 mg/dL    Chol/HDL Ratio 3.64    CBC & Differential    Specimen: Blood   Result Value Ref Range    WBC 3.26 (L) 3.40 - 10.80 10*3/mm3    RBC 3.98 3.77 - 5.28 10*6/mm3    Hemoglobin 13.3 12.0 - 15.9 g/dL    Hematocrit 40.0 34.0 - 46.6 %    .5 (H) 79.0 - 97.0 fL    MCH 33.4 (H) 26.6 - 33.0 pg    MCHC 33.3 31.5 - 35.7 g/dL    RDW 12.2 (L) 12.3 - 15.4 %    Platelets 241 140 - 450 10*3/mm3    Neutrophil Rel % 50.6 42.7 - 76.0 %    Lymphocyte Rel % 33.4 19.6 - 45.3 %    Monocyte Rel % 12.3 (H) 5.0 - 12.0 %    Eosinophil Rel % 2.8 0.3 - 6.2 %     Basophil Rel % 0.6 0.0 - 1.5 %    Neutrophils Absolute 1.65 (L) 1.70 - 7.00 10*3/mm3    Lymphocytes Absolute 1.09 0.70 - 3.10 10*3/mm3    Monocytes Absolute 0.40 0.10 - 0.90 10*3/mm3    Eosinophils Absolute 0.09 0.00 - 0.40 10*3/mm3    Basophils Absolute 0.02 0.00 - 0.20 10*3/mm3    Immature Granulocyte Rel % 0.3 0.0 - 0.5 %    Immature Grans Absolute 0.01 0.00 - 0.05 10*3/mm3    nRBC 0.0 0.0 - 0.2 /100 WBC     Result Review :                  Assessment and Plan    Diagnoses and all orders for this visit:    1. Stress at home (Primary)  -     sertraline (Zoloft) 25 MG tablet; Take 1 tablet by mouth Daily.  Dispense: 30 tablet; Refill: 0    2. Colon cancer screening  -     Cologuard - Stool, Per Rectum; Future    3. Prediabetes    4. Acquired hypothyroidism    5. Essential hypertension    6. Mixed hyperlipidemia    7. Gastroesophageal reflux disease, unspecified whether esophagitis present  -     omeprazole (priLOSEC) 40 MG capsule; Take 1 capsule by mouth 2 (two) times a day.  Dispense: 180 capsule; Refill: 1             Outpatient Medications Prior to Visit   Medication Sig Dispense Refill   • acetaminophen (TYLENOL) 650 MG 8 hr tablet Take 650 mg by mouth Every 8 (Eight) Hours As Needed.     • aspirin 81 MG tablet Take 81 mg by mouth every night.     • B Complex Vitamins (VITAMIN B COMPLEX IJ) Take 1 tablet by mouth 2 (two) times a day.     • carvedilol (COREG) 6.25 MG tablet TAKE ONE TABLET BY MOUTH TWICE A DAY WITH MEALS 180 tablet 3   • Cholecalciferol (VITAMIN D-3) 1000 UNITS capsule Take 1,000 Units by mouth every night.     • CO ENZYME Q-10 PO Take 100 mg by mouth 2 (two) times a day.     • fexofenadine (ALLEGRA) 60 MG tablet Take 60 mg by mouth Daily.     • Flaxseed, Linseed, (FLAXSEED OIL) 1200 MG capsule Take 2 capsules by mouth daily.     • Ginkgo Biloba Extract 60 MG capsule Take 1 tablet by mouth every morning.     • Glucosamine-Chondroit-Vit C-Mn (GLUCOSAMINE CHONDR 1500 COMPLX) capsule Take 2  capsules by mouth every morning.     • levothyroxine (SYNTHROID, LEVOTHROID) 50 MCG tablet TAKE ONE TABLET BY MOUTH EVERY MORNING 90 tablet 1   • losartan-hydrochlorothiazide (HYZAAR) 100-25 MG per tablet TAKE ONE TABLET BY MOUTH DAILY 90 tablet 2   • Misc Natural Products (ECHINACEA GOLDENSEAL PLUS) capsule Take 900 mg by mouth every morning.     • Multiple Vitamin (MULTIVITAMINS PO) Take 1 tablet by mouth daily.     • Potassium Gluconate 595 MG capsule Take 595 mcg/day by mouth every morning.     • S-Adenosylmethionine (ALLY-E) 200 MG tablet delayed-release Take  by mouth 2 (Two) Times a Day.     • simvastatin (ZOCOR) 10 MG tablet TAKE ONE TABLET BY MOUTH ONCE NIGHTLY 90 tablet 0   • omeprazole (priLOSEC) 20 MG capsule TAKE ONE CAPSULE BY MOUTH EVERY MORNING 90 capsule 3     No facility-administered medications prior to visit.     New Medications Ordered This Visit   Medications   • omeprazole (priLOSEC) 40 MG capsule     Sig: Take 1 capsule by mouth 2 (two) times a day.     Dispense:  180 capsule     Refill:  1   • sertraline (Zoloft) 25 MG tablet     Sig: Take 1 tablet by mouth Daily.     Dispense:  30 tablet     Refill:  0     [unfilled]  Medications Discontinued During This Encounter   Medication Reason   • omeprazole (priLOSEC) 20 MG capsule Reorder         Return in about 4 weeks (around 8/17/2021) for Recheck.    Patient was given instructions and counseling regarding her condition or for health maintenance advice. Please see specific information pulled into the AVS if appropriate.

## 2021-07-22 ENCOUNTER — TELEPHONE (OUTPATIENT)
Dept: INTERNAL MEDICINE | Facility: CLINIC | Age: 76
End: 2021-07-22

## 2021-08-10 DIAGNOSIS — E03.9 ACQUIRED HYPOTHYROIDISM: ICD-10-CM

## 2021-08-10 RX ORDER — LEVOTHYROXINE SODIUM 0.05 MG/1
TABLET ORAL
Qty: 90 TABLET | Refills: 1 | Status: SHIPPED | OUTPATIENT
Start: 2021-08-10 | End: 2022-02-14

## 2021-08-17 ENCOUNTER — OFFICE VISIT (OUTPATIENT)
Dept: INTERNAL MEDICINE | Facility: CLINIC | Age: 76
End: 2021-08-17

## 2021-08-17 VITALS
WEIGHT: 185 LBS | TEMPERATURE: 95.9 F | HEIGHT: 65 IN | BODY MASS INDEX: 30.82 KG/M2 | SYSTOLIC BLOOD PRESSURE: 130 MMHG | HEART RATE: 107 BPM | DIASTOLIC BLOOD PRESSURE: 72 MMHG | RESPIRATION RATE: 19 BRPM | OXYGEN SATURATION: 94 %

## 2021-08-17 DIAGNOSIS — K21.9 GASTROESOPHAGEAL REFLUX DISEASE, UNSPECIFIED WHETHER ESOPHAGITIS PRESENT: Primary | ICD-10-CM

## 2021-08-17 DIAGNOSIS — F43.9 STRESS AT HOME: ICD-10-CM

## 2021-08-17 PROCEDURE — 99214 OFFICE O/P EST MOD 30 MIN: CPT | Performed by: INTERNAL MEDICINE

## 2021-08-17 RX ORDER — SERTRALINE HYDROCHLORIDE 25 MG/1
25 TABLET, FILM COATED ORAL DAILY
Qty: 90 TABLET | Refills: 3 | Status: SHIPPED | OUTPATIENT
Start: 2021-08-17 | End: 2022-02-22 | Stop reason: SDUPTHER

## 2021-08-17 NOTE — PROGRESS NOTES
Carla Serrano is a 76 y.o. female, who presents with a chief complaint of   Chief Complaint   Patient presents with   • Follow-up     4 wks           HPI   Pt here for follow up. She was having more stress at home.  She started low dose sertraline. She is sleeping better at night and feels better during the day.      She was having a reflux flare and omeprazole increased to bid.  Her stomach feels better. She wants to know if it is ok to restart her probiotic. No constipation.    The following portions of the patient's history were reviewed and updated as appropriate: allergies, current medications, past family history, past medical history, past social history, past surgical history and problem list.    Allergies: Latex    Review of Systems   Constitutional: Negative.    HENT: Negative.    Eyes: Negative.    Respiratory: Negative.    Cardiovascular: Negative.    Gastrointestinal: Negative.    Endocrine: Negative.    Genitourinary: Negative.    Musculoskeletal: Negative.    Skin: Negative.    Allergic/Immunologic: Negative.    Neurological: Negative.    Hematological: Negative.    Psychiatric/Behavioral: Negative.    All other systems reviewed and are negative.            Wt Readings from Last 3 Encounters:   08/17/21 83.9 kg (185 lb)   07/20/21 86 kg (189 lb 9.6 oz)   01/26/21 85.7 kg (189 lb)     Temp Readings from Last 3 Encounters:   08/17/21 95.9 °F (35.5 °C) (Temporal)   07/20/21 96.2 °F (35.7 °C) (Temporal)   01/26/21 97.3 °F (36.3 °C) (Temporal)     BP Readings from Last 3 Encounters:   08/17/21 130/72   07/20/21 130/72   01/26/21 110/74     Pulse Readings from Last 3 Encounters:   08/17/21 107   07/20/21 96   01/26/21 71     Body mass index is 30.79 kg/m².  SpO2 Readings from Last 3 Encounters:   08/17/21 94%   07/20/21 93%   01/26/21 95%          Physical Exam  Vitals and nursing note reviewed.   Constitutional:       General: She is not in acute distress.     Appearance: She is well-developed.    HENT:      Head: Normocephalic and atraumatic.      Right Ear: External ear normal.      Left Ear: External ear normal.      Nose: Nose normal.   Eyes:      Conjunctiva/sclera: Conjunctivae normal.      Pupils: Pupils are equal, round, and reactive to light.   Cardiovascular:      Rate and Rhythm: Normal rate and regular rhythm.      Heart sounds: Normal heart sounds.   Pulmonary:      Effort: Pulmonary effort is normal. No respiratory distress.      Breath sounds: Normal breath sounds. No wheezing.   Musculoskeletal:         General: Normal range of motion.      Cervical back: Normal range of motion and neck supple.      Comments: Normal gait   Skin:     General: Skin is warm and dry.   Neurological:      Mental Status: She is alert and oriented to person, place, and time.   Psychiatric:         Behavior: Behavior normal.         Thought Content: Thought content normal.         Judgment: Judgment normal.         Results for orders placed or performed in visit on 07/13/21   TSH    Specimen: Blood   Result Value Ref Range    TSH 1.230 0.270 - 4.200 uIU/mL   T4, Free    Specimen: Blood   Result Value Ref Range    Free T4 1.27 0.93 - 1.70 ng/dL   Comprehensive Metabolic Panel    Specimen: Blood   Result Value Ref Range    Glucose 86 65 - 99 mg/dL    BUN 16 8 - 23 mg/dL    Creatinine 0.72 0.57 - 1.00 mg/dL    eGFR Non African Am 79 >60 mL/min/1.73    eGFR African Am 96 >60 mL/min/1.73    BUN/Creatinine Ratio 22.2 7.0 - 25.0    Sodium 143 136 - 145 mmol/L    Potassium 4.1 3.5 - 5.2 mmol/L    Chloride 103 98 - 107 mmol/L    Total CO2 28.5 22.0 - 29.0 mmol/L    Calcium 10.1 8.6 - 10.5 mg/dL    Total Protein 6.4 6.0 - 8.5 g/dL    Albumin 4.30 3.50 - 5.20 g/dL    Globulin 2.1 gm/dL    A/G Ratio 2.0 g/dL    Total Bilirubin 0.5 0.0 - 1.2 mg/dL    Alkaline Phosphatase 60 39 - 117 U/L    AST (SGOT) 17 1 - 32 U/L    ALT (SGPT) 19 1 - 33 U/L   Hemoglobin A1c    Specimen: Blood   Result Value Ref Range    Hemoglobin A1C 5.20 4.80  - 5.60 %   Lipid Panel With / Chol / HDL Ratio    Specimen: Blood   Result Value Ref Range    Total Cholesterol 211 (H) 0 - 200 mg/dL    Triglycerides 127 0 - 150 mg/dL    HDL Cholesterol 58 40 - 60 mg/dL    VLDL Cholesterol Jose Juan 23 5 - 40 mg/dL    LDL Chol Calc (NIH) 130 (H) 0 - 100 mg/dL    Chol/HDL Ratio 3.64    CBC & Differential    Specimen: Blood   Result Value Ref Range    WBC 3.26 (L) 3.40 - 10.80 10*3/mm3    RBC 3.98 3.77 - 5.28 10*6/mm3    Hemoglobin 13.3 12.0 - 15.9 g/dL    Hematocrit 40.0 34.0 - 46.6 %    .5 (H) 79.0 - 97.0 fL    MCH 33.4 (H) 26.6 - 33.0 pg    MCHC 33.3 31.5 - 35.7 g/dL    RDW 12.2 (L) 12.3 - 15.4 %    Platelets 241 140 - 450 10*3/mm3    Neutrophil Rel % 50.6 42.7 - 76.0 %    Lymphocyte Rel % 33.4 19.6 - 45.3 %    Monocyte Rel % 12.3 (H) 5.0 - 12.0 %    Eosinophil Rel % 2.8 0.3 - 6.2 %    Basophil Rel % 0.6 0.0 - 1.5 %    Neutrophils Absolute 1.65 (L) 1.70 - 7.00 10*3/mm3    Lymphocytes Absolute 1.09 0.70 - 3.10 10*3/mm3    Monocytes Absolute 0.40 0.10 - 0.90 10*3/mm3    Eosinophils Absolute 0.09 0.00 - 0.40 10*3/mm3    Basophils Absolute 0.02 0.00 - 0.20 10*3/mm3    Immature Granulocyte Rel % 0.3 0.0 - 0.5 %    Immature Grans Absolute 0.01 0.00 - 0.05 10*3/mm3    nRBC 0.0 0.0 - 0.2 /100 WBC     Result Review :                  Assessment and Plan    Diagnoses and all orders for this visit:    1. Gastroesophageal reflux disease, unspecified whether esophagitis present (Primary) - gi issues improving.  Cont ppi bid.  Ok to restart probiotic    2. Stress at home - sx improved with initiation of sertraline.  Cont low dose med  -     sertraline (Zoloft) 25 MG tablet; Take 1 tablet by mouth Daily.  Dispense: 90 tablet; Refill: 3       Reviewed current medication plan with patient today.  Continue current medications.  Encouraged healthy diet/exercise.  OV labs in 6   months.  Pt to call sooner if any other issues arise.      Encouraged covid vaccination if not already done.  Covid  vaccination can be scheduled at [email or web address]                 Outpatient Medications Prior to Visit   Medication Sig Dispense Refill   • acetaminophen (TYLENOL) 650 MG 8 hr tablet Take 650 mg by mouth Every 8 (Eight) Hours As Needed.     • aspirin 81 MG tablet Take 81 mg by mouth every night.     • B Complex Vitamins (VITAMIN B COMPLEX IJ) Take 1 tablet by mouth 2 (two) times a day.     • carvedilol (COREG) 6.25 MG tablet TAKE ONE TABLET BY MOUTH TWICE A DAY WITH MEALS 180 tablet 3   • Cholecalciferol (VITAMIN D-3) 1000 UNITS capsule Take 1,000 Units by mouth every night.     • CO ENZYME Q-10 PO Take 100 mg by mouth 2 (two) times a day.     • fexofenadine (ALLEGRA) 60 MG tablet Take 60 mg by mouth Daily.     • Flaxseed, Linseed, (FLAXSEED OIL) 1200 MG capsule Take 2 capsules by mouth daily.     • Ginkgo Biloba Extract 60 MG capsule Take 1 tablet by mouth every morning.     • Glucosamine-Chondroit-Vit C-Mn (GLUCOSAMINE CHONDR 1500 COMPLX) capsule Take 2 capsules by mouth every morning.     • levothyroxine (SYNTHROID, LEVOTHROID) 50 MCG tablet TAKE ONE TABLET BY MOUTH EVERY MORNING 90 tablet 1   • losartan-hydrochlorothiazide (HYZAAR) 100-25 MG per tablet TAKE ONE TABLET BY MOUTH DAILY 90 tablet 2   • Misc Natural Products (ECHINACEA GOLDENSEAL PLUS) capsule Take 900 mg by mouth every morning.     • Multiple Vitamin (MULTIVITAMINS PO) Take 1 tablet by mouth daily.     • omeprazole (priLOSEC) 40 MG capsule Take 1 capsule by mouth 2 (two) times a day. 180 capsule 1   • Potassium Gluconate 595 MG capsule Take 595 mcg/day by mouth every morning.     • S-Adenosylmethionine (ALLY-E) 200 MG tablet delayed-release Take  by mouth 2 (Two) Times a Day.     • simvastatin (ZOCOR) 10 MG tablet TAKE ONE TABLET BY MOUTH ONCE NIGHTLY 90 tablet 0   • sertraline (Zoloft) 25 MG tablet Take 1 tablet by mouth Daily. 30 tablet 0     No facility-administered medications prior to visit.     New Medications Ordered  This Visit   Medications   • sertraline (Zoloft) 25 MG tablet     Sig: Take 1 tablet by mouth Daily.     Dispense:  90 tablet     Refill:  3     [unfilled]  Medications Discontinued During This Encounter   Medication Reason   • sertraline (Zoloft) 25 MG tablet Reorder         Return in about 6 months (around 2/17/2022) for Recheck, labs.    Patient was given instructions and counseling regarding her condition or for health maintenance advice. Please see specific information pulled into the AVS if appropriate.

## 2021-12-18 DIAGNOSIS — E78.5 HYPERLIPIDEMIA, UNSPECIFIED HYPERLIPIDEMIA TYPE: ICD-10-CM

## 2021-12-20 RX ORDER — SIMVASTATIN 10 MG
TABLET ORAL
Qty: 90 TABLET | Refills: 0 | Status: SHIPPED | OUTPATIENT
Start: 2021-12-20 | End: 2022-03-10

## 2022-01-13 DIAGNOSIS — I10 ESSENTIAL HYPERTENSION: ICD-10-CM

## 2022-01-13 DIAGNOSIS — K21.9 GASTROESOPHAGEAL REFLUX DISEASE, UNSPECIFIED WHETHER ESOPHAGITIS PRESENT: ICD-10-CM

## 2022-01-13 RX ORDER — OMEPRAZOLE 40 MG/1
CAPSULE, DELAYED RELEASE ORAL
Qty: 180 CAPSULE | Refills: 1 | Status: SHIPPED | OUTPATIENT
Start: 2022-01-13 | End: 2022-03-25

## 2022-01-13 RX ORDER — LOSARTAN POTASSIUM AND HYDROCHLOROTHIAZIDE 25; 100 MG/1; MG/1
TABLET ORAL
Qty: 90 TABLET | Refills: 2 | Status: SHIPPED | OUTPATIENT
Start: 2022-01-13 | End: 2022-10-10

## 2022-02-10 DIAGNOSIS — E03.9 ACQUIRED HYPOTHYROIDISM: ICD-10-CM

## 2022-02-11 ENCOUNTER — TELEPHONE (OUTPATIENT)
Dept: INTERNAL MEDICINE | Facility: CLINIC | Age: 77
End: 2022-02-11

## 2022-02-14 RX ORDER — CARVEDILOL 6.25 MG/1
TABLET ORAL
Qty: 180 TABLET | Refills: 3 | Status: SHIPPED | OUTPATIENT
Start: 2022-02-14 | End: 2022-03-25

## 2022-02-14 RX ORDER — LEVOTHYROXINE SODIUM 0.05 MG/1
TABLET ORAL
Qty: 90 TABLET | Refills: 1 | Status: SHIPPED | OUTPATIENT
Start: 2022-02-14 | End: 2022-08-17

## 2022-02-15 ENCOUNTER — LAB (OUTPATIENT)
Dept: INTERNAL MEDICINE | Facility: CLINIC | Age: 77
End: 2022-02-15

## 2022-02-15 DIAGNOSIS — E03.9 ACQUIRED HYPOTHYROIDISM: Primary | ICD-10-CM

## 2022-02-15 DIAGNOSIS — I10 ESSENTIAL HYPERTENSION: ICD-10-CM

## 2022-02-15 DIAGNOSIS — E78.5 HYPERLIPIDEMIA, UNSPECIFIED HYPERLIPIDEMIA TYPE: ICD-10-CM

## 2022-02-15 DIAGNOSIS — E03.9 ACQUIRED HYPOTHYROIDISM: ICD-10-CM

## 2022-02-16 LAB
ALBUMIN SERPL-MCNC: 4.1 G/DL (ref 3.7–4.7)
ALBUMIN/GLOB SERPL: 1.7 {RATIO} (ref 1.2–2.2)
ALP SERPL-CCNC: 83 IU/L (ref 44–121)
ALT SERPL-CCNC: 16 IU/L (ref 0–32)
AST SERPL-CCNC: 17 IU/L (ref 0–40)
BASOPHILS # BLD AUTO: 0 X10E3/UL (ref 0–0.2)
BASOPHILS NFR BLD AUTO: 1 %
BILIRUB SERPL-MCNC: 0.4 MG/DL (ref 0–1.2)
BUN SERPL-MCNC: 17 MG/DL (ref 8–27)
BUN/CREAT SERPL: 21 (ref 12–28)
CALCIUM SERPL-MCNC: 9.4 MG/DL (ref 8.7–10.3)
CHLORIDE SERPL-SCNC: 104 MMOL/L (ref 96–106)
CHOLEST SERPL-MCNC: 207 MG/DL (ref 100–199)
CO2 SERPL-SCNC: 24 MMOL/L (ref 20–29)
CREAT SERPL-MCNC: 0.81 MG/DL (ref 0.57–1)
EOSINOPHIL # BLD AUTO: 0.1 X10E3/UL (ref 0–0.4)
EOSINOPHIL NFR BLD AUTO: 2 %
ERYTHROCYTE [DISTWIDTH] IN BLOOD BY AUTOMATED COUNT: 12.3 % (ref 11.7–15.4)
GLOBULIN SER CALC-MCNC: 2.4 G/DL (ref 1.5–4.5)
GLUCOSE SERPL-MCNC: 91 MG/DL (ref 65–99)
HCT VFR BLD AUTO: 35.8 % (ref 34–46.6)
HDLC SERPL-MCNC: 61 MG/DL
HGB BLD-MCNC: 12.1 G/DL (ref 11.1–15.9)
IMM GRANULOCYTES # BLD AUTO: 0 X10E3/UL (ref 0–0.1)
IMM GRANULOCYTES NFR BLD AUTO: 0 %
LDLC SERPL CALC-MCNC: 125 MG/DL (ref 0–99)
LDLC/HDLC SERPL: 2 RATIO (ref 0–3.2)
LYMPHOCYTES # BLD AUTO: 0.9 X10E3/UL (ref 0.7–3.1)
LYMPHOCYTES NFR BLD AUTO: 28 %
MCH RBC QN AUTO: 32.9 PG (ref 26.6–33)
MCHC RBC AUTO-ENTMCNC: 33.8 G/DL (ref 31.5–35.7)
MCV RBC AUTO: 97 FL (ref 79–97)
MONOCYTES # BLD AUTO: 0.3 X10E3/UL (ref 0.1–0.9)
MONOCYTES NFR BLD AUTO: 9 %
NEUTROPHILS # BLD AUTO: 2 X10E3/UL (ref 1.4–7)
NEUTROPHILS NFR BLD AUTO: 60 %
PLATELET # BLD AUTO: 246 X10E3/UL (ref 150–450)
POTASSIUM SERPL-SCNC: 3.9 MMOL/L (ref 3.5–5.2)
PROT SERPL-MCNC: 6.5 G/DL (ref 6–8.5)
RBC # BLD AUTO: 3.68 X10E6/UL (ref 3.77–5.28)
SODIUM SERPL-SCNC: 142 MMOL/L (ref 134–144)
T4 FREE SERPL-MCNC: 1.27 NG/DL (ref 0.82–1.77)
TRIGL SERPL-MCNC: 116 MG/DL (ref 0–149)
TSH SERPL DL<=0.005 MIU/L-ACNC: 1.48 UIU/ML (ref 0.45–4.5)
VLDLC SERPL CALC-MCNC: 21 MG/DL (ref 5–40)
WBC # BLD AUTO: 3.3 X10E3/UL (ref 3.4–10.8)

## 2022-02-22 ENCOUNTER — OFFICE VISIT (OUTPATIENT)
Dept: INTERNAL MEDICINE | Facility: CLINIC | Age: 77
End: 2022-02-22

## 2022-02-22 VITALS
HEIGHT: 65 IN | HEART RATE: 98 BPM | BODY MASS INDEX: 31.87 KG/M2 | DIASTOLIC BLOOD PRESSURE: 80 MMHG | SYSTOLIC BLOOD PRESSURE: 126 MMHG | RESPIRATION RATE: 23 BRPM | WEIGHT: 191.3 LBS | TEMPERATURE: 97.8 F | OXYGEN SATURATION: 99 %

## 2022-02-22 DIAGNOSIS — K21.00 GASTROESOPHAGEAL REFLUX DISEASE WITH ESOPHAGITIS WITHOUT HEMORRHAGE: ICD-10-CM

## 2022-02-22 DIAGNOSIS — I25.10 CORONARY ARTERY DISEASE INVOLVING NATIVE CORONARY ARTERY OF NATIVE HEART WITHOUT ANGINA PECTORIS: ICD-10-CM

## 2022-02-22 DIAGNOSIS — Z00.00 MEDICARE ANNUAL WELLNESS VISIT, SUBSEQUENT: Primary | ICD-10-CM

## 2022-02-22 DIAGNOSIS — F43.9 STRESS AT HOME: ICD-10-CM

## 2022-02-22 DIAGNOSIS — E78.2 MIXED HYPERLIPIDEMIA: ICD-10-CM

## 2022-02-22 DIAGNOSIS — E03.9 ACQUIRED HYPOTHYROIDISM: ICD-10-CM

## 2022-02-22 DIAGNOSIS — Z12.31 ENCOUNTER FOR SCREENING MAMMOGRAM FOR MALIGNANT NEOPLASM OF BREAST: ICD-10-CM

## 2022-02-22 DIAGNOSIS — Z12.11 COLON CANCER SCREENING: ICD-10-CM

## 2022-02-22 DIAGNOSIS — F32.A DEPRESSION, UNSPECIFIED DEPRESSION TYPE: ICD-10-CM

## 2022-02-22 DIAGNOSIS — I10 PRIMARY HYPERTENSION: ICD-10-CM

## 2022-02-22 PROCEDURE — 99214 OFFICE O/P EST MOD 30 MIN: CPT | Performed by: INTERNAL MEDICINE

## 2022-02-22 PROCEDURE — 1170F FXNL STATUS ASSESSED: CPT | Performed by: INTERNAL MEDICINE

## 2022-02-22 PROCEDURE — G0439 PPPS, SUBSEQ VISIT: HCPCS | Performed by: INTERNAL MEDICINE

## 2022-02-22 PROCEDURE — 1159F MED LIST DOCD IN RCRD: CPT | Performed by: INTERNAL MEDICINE

## 2022-02-22 NOTE — PROGRESS NOTES
Carla Serrano is a 76 y.o. female, who presents with a chief complaint of   Chief Complaint   Patient presents with   • Hypothyroidism   • Depression   • Hypertension   • Hyperlipidemia   • Medicare Wellness-subsequent           HPI   Pt here for f/u and medicare wellness.      Stress  - on low dose sertraline      CAD - pt s/p CABG 2011.  Last stress test 2016. She denies soa, cp, light, or LE edema.  She follows with dr. Pantoja but last appt 2015.  She last saw the cards NP on 2016.      Hypothyroidism - labs normal.No hair/skin changes     HLD - on statin.  No cramps or myalgias     HTN - well controlled.  No ha/dizziness.       GERD - on PPI.  she does ok as long as she sticks to foods she knows she can eat.  She occ needs to take a second dose of the ppi.  She has a kit at home for cologuard but hasn't sent it back in.  She had a colonoscopy years ago and didn't have a good experience.     She is trying to eat a healthy diet and stay active.  she hasnt been walking quite as much during the summer     S/p Left TKA - 4/2016.      She c/o arthritis pain all over.  This pain keeps her from doing some activities. She will have 2-3 good days then 3-4 where she doesn't feel well. No help with voltaren gel. She can't take nsaids bc of GERD so she takes tylenol.        The following portions of the patient's history were reviewed and updated as appropriate: allergies, current medications, past family history, past medical history, past social history, past surgical history and problem list.    Allergies: Latex    Review of Systems   Constitutional: Negative.    HENT: Negative.    Eyes: Negative.    Respiratory: Negative.    Cardiovascular: Negative.    Gastrointestinal: Negative.    Endocrine: Negative.    Genitourinary: Negative.    Musculoskeletal: Negative.    Skin: Negative.    Allergic/Immunologic: Negative.    Neurological: Negative.    Hematological: Negative.    Psychiatric/Behavioral: Negative.    All other  systems reviewed and are negative.            Wt Readings from Last 3 Encounters:   02/22/22 86.8 kg (191 lb 4.8 oz)   08/17/21 83.9 kg (185 lb)   07/20/21 86 kg (189 lb 9.6 oz)     Temp Readings from Last 3 Encounters:   02/22/22 97.8 °F (36.6 °C) (Temporal)   08/17/21 95.9 °F (35.5 °C) (Temporal)   07/20/21 96.2 °F (35.7 °C) (Temporal)     BP Readings from Last 3 Encounters:   02/22/22 126/80   08/17/21 130/72   07/20/21 130/72     Pulse Readings from Last 3 Encounters:   02/22/22 98   08/17/21 107   07/20/21 96     Body mass index is 31.83 kg/m².  SpO2 Readings from Last 3 Encounters:   02/22/22 99%   08/17/21 94%   07/20/21 93%          Physical Exam  Vitals and nursing note reviewed.   Constitutional:       General: She is not in acute distress.     Appearance: She is well-developed.   HENT:      Head: Normocephalic and atraumatic.      Right Ear: External ear normal.      Left Ear: External ear normal.      Nose: Nose normal.   Eyes:      Conjunctiva/sclera: Conjunctivae normal.      Pupils: Pupils are equal, round, and reactive to light.   Cardiovascular:      Rate and Rhythm: Normal rate and regular rhythm.      Heart sounds: Normal heart sounds.   Pulmonary:      Effort: Pulmonary effort is normal. No respiratory distress.      Breath sounds: Normal breath sounds. No wheezing.   Musculoskeletal:         General: Normal range of motion.      Cervical back: Normal range of motion and neck supple.      Comments: Normal gait   Skin:     General: Skin is warm and dry.      Comments: Chronic skin changes on back c/w chronic heating pad use   Neurological:      General: No focal deficit present.      Mental Status: She is alert and oriented to person, place, and time.   Psychiatric:         Mood and Affect: Mood normal.         Behavior: Behavior normal.         Thought Content: Thought content normal.         Judgment: Judgment normal.         Results for orders placed or performed in visit on 02/15/22   Lipid  Panel With LDL / HDL Ratio    Specimen: Blood   Result Value Ref Range    Total Cholesterol 207 (H) 100 - 199 mg/dL    Triglycerides 116 0 - 149 mg/dL    HDL Cholesterol 61 >39 mg/dL    VLDL Cholesterol Jose Juan 21 5 - 40 mg/dL    LDL Chol Calc (NIH) 125 (H) 0 - 99 mg/dL    LDL/HDL RATIO 2.0 0.0 - 3.2 ratio   TSH    Specimen: Blood   Result Value Ref Range    TSH 1.480 0.450 - 4.500 uIU/mL   T4, Free    Specimen: Blood   Result Value Ref Range    Free T4 1.27 0.82 - 1.77 ng/dL   Comprehensive Metabolic Panel    Specimen: Blood   Result Value Ref Range    Glucose 91 65 - 99 mg/dL    BUN 17 8 - 27 mg/dL    Creatinine 0.81 0.57 - 1.00 mg/dL    eGFR Non African Am 71 >59 mL/min/1.73    eGFR African Am 82 >59 mL/min/1.73    BUN/Creatinine Ratio 21 12 - 28    Sodium 142 134 - 144 mmol/L    Potassium 3.9 3.5 - 5.2 mmol/L    Chloride 104 96 - 106 mmol/L    Total CO2 24 20 - 29 mmol/L    Calcium 9.4 8.7 - 10.3 mg/dL    Total Protein 6.5 6.0 - 8.5 g/dL    Albumin 4.1 3.7 - 4.7 g/dL    Globulin 2.4 1.5 - 4.5 g/dL    A/G Ratio 1.7 1.2 - 2.2    Total Bilirubin 0.4 0.0 - 1.2 mg/dL    Alkaline Phosphatase 83 44 - 121 IU/L    AST (SGOT) 17 0 - 40 IU/L    ALT (SGPT) 16 0 - 32 IU/L   CBC & Differential    Specimen: Blood   Result Value Ref Range    WBC 3.3 (L) 3.4 - 10.8 x10E3/uL    RBC 3.68 (L) 3.77 - 5.28 x10E6/uL    Hemoglobin 12.1 11.1 - 15.9 g/dL    Hematocrit 35.8 34.0 - 46.6 %    MCV 97 79 - 97 fL    MCH 32.9 26.6 - 33.0 pg    MCHC 33.8 31.5 - 35.7 g/dL    RDW 12.3 11.7 - 15.4 %    Platelets 246 150 - 450 x10E3/uL    Neutrophil Rel % 60 Not Estab. %    Lymphocyte Rel % 28 Not Estab. %    Monocyte Rel % 9 Not Estab. %    Eosinophil Rel % 2 Not Estab. %    Basophil Rel % 1 Not Estab. %    Neutrophils Absolute 2.0 1.4 - 7.0 x10E3/uL    Lymphocytes Absolute 0.9 0.7 - 3.1 x10E3/uL    Monocytes Absolute 0.3 0.1 - 0.9 x10E3/uL    Eosinophils Absolute 0.1 0.0 - 0.4 x10E3/uL    Basophils Absolute 0.0 0.0 - 0.2 x10E3/uL    Immature  Granulocyte Rel % 0 Not Estab. %    Immature Grans Absolute 0.0 0.0 - 0.1 x10E3/uL     Result Review :                  Assessment and Plan    Diagnoses and all orders for this visit:    1. Medicare annual wellness visit, subsequent (Primary)  -     Mammo Screening Bilateral With CAD; Future  -     Cologuard - Stool, Per Rectum; Future    2. Primary hypertension  -     Ambulatory Referral to Cardiology    3. Mixed hyperlipidemia  -     Ambulatory Referral to Cardiology    4. Coronary artery disease involving native coronary artery of native heart without angina pectoris  -     Ambulatory Referral to Cardiology    5. Acquired hypothyroidism    6. Gastroesophageal reflux disease with esophagitis without hemorrhage    7. Depression, unspecified depression type    8. Stress at home  -     sertraline (Zoloft) 50 MG tablet; Take 1 tablet by mouth Daily.  Dispense: 90 tablet; Refill: 0    9. Encounter for screening mammogram for malignant neoplasm of breast  -     Mammo Screening Bilateral With CAD; Future    10. Colon cancer screening  -     Cologuard - Stool, Per Rectum; Future                 Outpatient Medications Prior to Visit   Medication Sig Dispense Refill   • acetaminophen (TYLENOL) 650 MG 8 hr tablet Take 650 mg by mouth Every 8 (Eight) Hours As Needed.     • aspirin 81 MG tablet Take 81 mg by mouth every night.     • B Complex Vitamins (VITAMIN B COMPLEX IJ) Take 1 tablet by mouth 2 (two) times a day.     • carvedilol (COREG) 6.25 MG tablet TAKE ONE TABLET BY MOUTH TWICE A DAY WITH MEALS 180 tablet 3   • Cholecalciferol (VITAMIN D-3) 1000 UNITS capsule Take 1,000 Units by mouth every night.     • CO ENZYME Q-10 PO Take 100 mg by mouth 2 (two) times a day.     • fexofenadine (ALLEGRA) 60 MG tablet Take 60 mg by mouth Daily.     • Flaxseed, Linseed, (FLAXSEED OIL) 1200 MG capsule Take 2 capsules by mouth daily.     • Ginkgo Biloba Extract 60 MG capsule Take 1 tablet by mouth every morning.     •  Glucosamine-Chondroit-Vit C-Mn (GLUCOSAMINE CHONDR 1500 COMPLX) capsule Take 2 capsules by mouth every morning.     • levothyroxine (SYNTHROID, LEVOTHROID) 50 MCG tablet TAKE ONE TABLET BY MOUTH EVERY MORNING 90 tablet 1   • losartan-hydrochlorothiazide (HYZAAR) 100-25 MG per tablet TAKE ONE TABLET BY MOUTH DAILY 90 tablet 2   • Misc Natural Products (ECHINACEA GOLDENSEAL PLUS) capsule Take 900 mg by mouth every morning.     • Multiple Vitamin (MULTIVITAMINS PO) Take 1 tablet by mouth daily.     • omeprazole (priLOSEC) 40 MG capsule TAKE ONE CAPSULE BY MOUTH TWICE A  capsule 1   • Potassium Gluconate 595 MG capsule Take 595 mcg/day by mouth every morning.     • S-Adenosylmethionine (ALLY-E) 200 MG tablet delayed-release Take  by mouth 2 (Two) Times a Day.     • simvastatin (ZOCOR) 10 MG tablet TAKE ONE TABLET BY MOUTH ONCE NIGHTLY 90 tablet 0   • sertraline (Zoloft) 25 MG tablet Take 1 tablet by mouth Daily. 90 tablet 3     No facility-administered medications prior to visit.     New Medications Ordered This Visit   Medications   • sertraline (Zoloft) 50 MG tablet     Sig: Take 1 tablet by mouth Daily.     Dispense:  90 tablet     Refill:  0     [unfilled]  Medications Discontinued During This Encounter   Medication Reason   • sertraline (Zoloft) 25 MG tablet Reorder         Return in about 6 months (around 8/22/2022) for Recheck, labs.    Patient was given instructions and counseling regarding her condition or for health maintenance advice. Please see specific information pulled into the AVS if appropriate.

## 2022-02-22 NOTE — PROGRESS NOTES
The ABCs of the Annual Wellness Visit  Subsequent Medicare Wellness Visit    Chief Complaint   Patient presents with   • Hypothyroidism   • Depression   • Hypertension   • Hyperlipidemia   • Medicare Wellness-subsequent      Subjective    History of Present Illness:  Carla Serrano is a 76 y.o. female who presents for a Subsequent Medicare Wellness Visit.    The following portions of the patient's history were reviewed and   updated as appropriate: allergies, current medications, past family history, past medical history, past social history, past surgical history and problem list.    Compared to one year ago, the patient feels her physical   health is the same.    Compared to one year ago, the patient feels her mental   health is the same.    Recent Hospitalizations:  She was not admitted to the hospital during the last year.       Current Medical Providers:  Patient Care Team:  Hilda Loera MD as PCP - General (Internal Medicine & Pediatrics)    Outpatient Medications Prior to Visit   Medication Sig Dispense Refill   • acetaminophen (TYLENOL) 650 MG 8 hr tablet Take 650 mg by mouth Every 8 (Eight) Hours As Needed.     • aspirin 81 MG tablet Take 81 mg by mouth every night.     • B Complex Vitamins (VITAMIN B COMPLEX IJ) Take 1 tablet by mouth 2 (two) times a day.     • carvedilol (COREG) 6.25 MG tablet TAKE ONE TABLET BY MOUTH TWICE A DAY WITH MEALS 180 tablet 3   • Cholecalciferol (VITAMIN D-3) 1000 UNITS capsule Take 1,000 Units by mouth every night.     • CO ENZYME Q-10 PO Take 100 mg by mouth 2 (two) times a day.     • fexofenadine (ALLEGRA) 60 MG tablet Take 60 mg by mouth Daily.     • Flaxseed, Linseed, (FLAXSEED OIL) 1200 MG capsule Take 2 capsules by mouth daily.     • Ginkgo Biloba Extract 60 MG capsule Take 1 tablet by mouth every morning.     • Glucosamine-Chondroit-Vit C-Mn (GLUCOSAMINE CHONDR 1500 COMPLX) capsule Take 2 capsules by mouth every morning.     • levothyroxine (SYNTHROID,  LEVOTHROID) 50 MCG tablet TAKE ONE TABLET BY MOUTH EVERY MORNING 90 tablet 1   • losartan-hydrochlorothiazide (HYZAAR) 100-25 MG per tablet TAKE ONE TABLET BY MOUTH DAILY 90 tablet 2   • Misc Natural Products (ECHINACEA GOLDENSEAL PLUS) capsule Take 900 mg by mouth every morning.     • Multiple Vitamin (MULTIVITAMINS PO) Take 1 tablet by mouth daily.     • omeprazole (priLOSEC) 40 MG capsule TAKE ONE CAPSULE BY MOUTH TWICE A  capsule 1   • Potassium Gluconate 595 MG capsule Take 595 mcg/day by mouth every morning.     • S-Adenosylmethionine (ALLY-E) 200 MG tablet delayed-release Take  by mouth 2 (Two) Times a Day.     • simvastatin (ZOCOR) 10 MG tablet TAKE ONE TABLET BY MOUTH ONCE NIGHTLY 90 tablet 0   • sertraline (Zoloft) 25 MG tablet Take 1 tablet by mouth Daily. 90 tablet 3     No facility-administered medications prior to visit.       No opioid medication identified on active medication list. I have reviewed chart for other potential  high risk medication/s and harmful drug interactions in the elderly.          Aspirin is on active medication list. Aspirin use is indicated based on review of current medical condition/s. Pros and cons of this therapy have been discussed today. Benefits of this medication outweigh potential harm.  Patient has been encouraged to continue taking this medication.  .      Patient Active Problem List   Diagnosis   • Edema   • DJD (degenerative joint disease) of knee   • CAD (coronary artery disease)   • SVT (supraventricular tachycardia) (HCC)   • Hypertension   • JOVANNA (obstructive sleep apnea)   • Hyperlipidemia   • Hypothyroidism   • Prediabetes   • Incontinence in female   • Colon cancer screening   • Vomiting   • High risk medication use   • Reflux esophagitis   • Depression     Advance Care Planning  Advance Directive is on file.  ACP discussion was held with the patient during this visit. Patient has an advance directive in EMR which is still valid.           Objective   "  Vitals:    02/22/22 1105   BP: 126/80   Pulse: 98   Resp: 23   Temp: 97.8 °F (36.6 °C)   TempSrc: Temporal   SpO2: 99%   Weight: 86.8 kg (191 lb 4.8 oz)   Height: 165.1 cm (65\")     BMI Readings from Last 1 Encounters:   02/22/22 31.83 kg/m²   BMI is above normal parameters. Recommendations include: exercise counseling and nutrition counseling    Does the patient have evidence of cognitive impairment? No    Physical Exam  Lab Results   Component Value Date    CHLPL 207 (H) 02/15/2022    TRIG 116 02/15/2022    HDL 61 02/15/2022     (H) 02/15/2022    VLDL 21 02/15/2022            HEALTH RISK ASSESSMENT    Smoking Status:  Social History     Tobacco Use   Smoking Status Never Smoker   Smokeless Tobacco Never Used     Alcohol Consumption:  Social History     Substance and Sexual Activity   Alcohol Use No     Fall Risk Screen:    STEADI Fall Risk Assessment was completed, and patient is at MODERATE risk for falls. Assessment completed on:2/22/2022    Depression Screening:  PHQ-2/PHQ-9 Depression Screening 2/22/2022   Little interest or pleasure in doing things 0   Feeling down, depressed, or hopeless 1   Trouble falling or staying asleep, or sleeping too much -   Feeling tired or having little energy -   Poor appetite or overeating -   Feeling bad about yourself - or that you are a failure or have let yourself or your family down -   Trouble concentrating on things, such as reading the newspaper or watching television -   Moving or speaking so slowly that other people could have noticed. Or the opposite - being so fidgety or restless that you have been moving around a lot more than usual -   Thoughts that you would be better off dead, or of hurting yourself in some way -   Total Score 1   If you checked off any problems, how difficult have these problems made it for you to do your work, take care of things at home, or get along with other people? -       Health Habits and Functional and Cognitive " Screening:  Functional & Cognitive Status 7/20/2021   Do you have difficulty preparing food and eating? No   Do you have difficulty bathing yourself, getting dressed or grooming yourself? No   Do you have difficulty using the toilet? No   Do you have difficulty moving around from place to place? No   Do you have trouble with steps or getting out of a bed or a chair? No   Current Diet Well Balanced Diet   Dental Exam Up to date   Eye Exam Up to date   Exercise (times per week) 3 times per week   Current Exercises Include Gardening   Current Exercise Activities Include -   Do you need help using the phone?  No   Are you deaf or do you have serious difficulty hearing?  No   Do you need help with transportation? No   Do you need help shopping? No   Do you need help preparing meals?  No   Do you need help with housework?  No   Do you need help with laundry? No   Do you need help taking your medications? No   Do you need help managing money? No   Do you ever drive or ride in a car without wearing a seat belt? No   Have you felt unusual stress, anger or loneliness in the last month? Yes   Who do you live with? Spouse   If you need help, do you have trouble finding someone available to you? No   Have you been bothered in the last four weeks by sexual problems? No   Do you have difficulty concentrating, remembering or making decisions? No       Age-appropriate Screening Schedule:  Refer to the list below for future screening recommendations based on patient's age, sex and/or medical conditions. Orders for these recommended tests are listed in the plan section. The patient has been provided with a written plan.    Health Maintenance   Topic Date Due   • MAMMOGRAM  02/22/2022   • ZOSTER VACCINE (1 of 2) 02/22/2022 (Originally 4/15/1996)   • DXA SCAN  02/04/2023   • LIPID PANEL  02/15/2023   • TDAP/TD VACCINES (2 - Td or Tdap) 02/23/2026   • INFLUENZA VACCINE  Completed              Assessment/Plan   CMS Preventative Services  Quick Reference  Risk Factors Identified During Encounter  Cardiovascular Disease  Depression/Dysphoria  Fall Risk-High or Moderate  Immunizations Discussed/Encouraged (specific Immunizations; Shingrix  Inactivity/Sedentary  Obesity/Overweight   Polypharmacy  The above risks/problems have been discussed with the patient.  Follow up actions/plans if indicated are seen below in the Assessment/Plan Section.  Pertinent information has been shared with the patient in the After Visit Summary.    Diagnoses and all orders for this visit:    1. Medicare annual wellness visit, subsequent (Primary)  -     Mammo Screening Bilateral With CAD; Future  -     Cologuard - Stool, Per Rectum; Future    2. Primary hypertension  -     Ambulatory Referral to Cardiology    3. Mixed hyperlipidemia  -     Ambulatory Referral to Cardiology    4. Coronary artery disease involving native coronary artery of native heart without angina pectoris  -     Ambulatory Referral to Cardiology    5. Acquired hypothyroidism    6. Gastroesophageal reflux disease with esophagitis without hemorrhage    7. Depression, unspecified depression type    8. Stress at home  -     sertraline (Zoloft) 50 MG tablet; Take 1 tablet by mouth Daily.  Dispense: 90 tablet; Refill: 0    9. Encounter for screening mammogram for malignant neoplasm of breast  -     Mammo Screening Bilateral With CAD; Future    10. Colon cancer screening  -     Cologuard - Stool, Per Rectum; Future        Follow Up:   Return in about 6 months (around 8/22/2022) for Recheck, labs.     An After Visit Summary and PPPS were made available to the patient.

## 2022-02-22 NOTE — PATIENT INSTRUCTIONS
Cymbalta (duloxetine) - med that can help with mood and arthritis pain.      Medicare Wellness  Personal Prevention Plan of Service     Date of Office Visit:  2022  Encounter Provider:  Hilda Loera MD  Place of Service:  [facility]  Patient Name: Carla Serrano  :  1945    As part of the Medicare Wellness portion of your visit today, we are providing you with this personalized preventive plan of services (PPPS). This plan is based upon recommendations of the [place] Preventive Services Task Force (USPSTF) and the Advisory Committee on Immunization Practices (ACIP).    This lists the preventive care services that should be considered, and provides dates of when you are due. Items listed as completed are up-to-date and do not require any further intervention.    Health Maintenance   Topic Date Due   • COLORECTAL CANCER SCREENING  2021   • MAMMOGRAM  2022   • ZOSTER VACCINE (1 of 2) 2022 (Originally 4/15/1996)   • DXA SCAN  2023   • LIPID PANEL  02/15/2023   • ANNUAL WELLNESS VISIT  2023   • TDAP/TD VACCINES (2 - Td or Tdap) 2026   • HEPATITIS C SCREENING  Completed   • COVID-19 Vaccine  Completed   • INFLUENZA VACCINE  Completed   • Pneumococcal Vaccine 65+  Completed       Orders Placed This Encounter   Procedures   • Mammo Screening Bilateral With CAD     Standing Status:   Future     Standing Expiration Date:   2023   • Cologuard - Stool, Per Rectum     Standing Status:   Future     Standing Expiration Date:   2023     Order Specific Question:   Release to patient     Answer:   Immediate   • Ambulatory Referral to Cardiology     Referral Priority:   Routine     Referral Type:   Consultation     Referral Reason:   Specialty Services Required     Requested Specialty:   Cardiology     Number of Visits Requested:   1       Return in about 6 months (around 2022) for Recheck, labs.

## 2022-03-10 ENCOUNTER — OFFICE VISIT (OUTPATIENT)
Dept: CARDIOLOGY | Facility: CLINIC | Age: 77
End: 2022-03-10

## 2022-03-10 VITALS
BODY MASS INDEX: 31.82 KG/M2 | HEIGHT: 65 IN | WEIGHT: 191 LBS | SYSTOLIC BLOOD PRESSURE: 130 MMHG | DIASTOLIC BLOOD PRESSURE: 80 MMHG | HEART RATE: 80 BPM

## 2022-03-10 DIAGNOSIS — R06.02 EXERTIONAL SHORTNESS OF BREATH: ICD-10-CM

## 2022-03-10 DIAGNOSIS — E78.2 MIXED HYPERLIPIDEMIA: ICD-10-CM

## 2022-03-10 DIAGNOSIS — I25.810 CORONARY ARTERY DISEASE INVOLVING CORONARY BYPASS GRAFT OF NATIVE HEART WITHOUT ANGINA PECTORIS: Primary | ICD-10-CM

## 2022-03-10 DIAGNOSIS — G47.33 OSA (OBSTRUCTIVE SLEEP APNEA): ICD-10-CM

## 2022-03-10 DIAGNOSIS — I10 PRIMARY HYPERTENSION: ICD-10-CM

## 2022-03-10 PROCEDURE — 99204 OFFICE O/P NEW MOD 45 MIN: CPT | Performed by: INTERNAL MEDICINE

## 2022-03-10 PROCEDURE — 93000 ELECTROCARDIOGRAM COMPLETE: CPT | Performed by: INTERNAL MEDICINE

## 2022-03-10 RX ORDER — ATORVASTATIN CALCIUM 40 MG/1
20 TABLET, FILM COATED ORAL DAILY
Qty: 90 TABLET | Refills: 3 | Status: SHIPPED | OUTPATIENT
Start: 2022-03-10

## 2022-03-10 NOTE — PROGRESS NOTES
"PATIENTINFORMATION    Date of Office Visit: 03/10/2022  Encounter Provider: Antony Galvin MD  Place of Service: [facility] CARD[facility]OLOG[facility]  Patient Name: Carla Serrano  : 1945    Subjective:     Encounter Date:03/10/2022      Patient ID: Carla Serrano is a 76 y.o. female.    No chief complaint on file.    HPI  Ms. Serrano is a pleasant 75 yo lady  with past medical history of coronary artery disease/CABG x3, hyperlipidemia, hypertension, JOVANNA, SVT came to cardiac clinic to establish care.  She saw Dr. Pantoja in .   She reports increased exertional shortness of breath lately especially when climbing stairs and inclines surface.  She had similar symptom but more severe before bypass surgery in .  She denies any chest discomfort, orthopnea, PND, palpitations, presyncope or syncope.  She has chronic bilateral lower extremity swelling without any significant changes.  Patient is compliant with current medical regimen and denies any significant side effects from medications. No recent ER visit or hospitalization.She tries to walk regularly for about 15-20 minutes totaling about more than 5000 steps per day per hair [employer].  She checks her blood pressure occasionally and runs normal.  She reports chronic joint pains for which she takes Tylenol.  No current tobacco, recreational drug use or\" abuse.    ROS  All systems reviewed and negative except as noted in HPI.    Past Medical History:   Diagnosis Date   • Allergic rhinitis    • Anemia    • Arthritis    • CAD (coronary artery disease) 2011    h/o CABG x3   • Cardiomyopathy (HCC)    • Degenerative joint disease of knee, left     sched replacement   • Edema    • GERD (gastroesophageal reflux disease)    • Heart disease    • Hiatal hernia    • Hyperlipidemia    • Hypertension    • Hypothyroidism    • Incontinence in female 8/10/2017   • JOVANNA (obstructive sleep apnea)     no machine, uses nasal strips   • Osteoporosis    • Prediabetes    • " SVT (supraventricular tachycardia) (HCC)    • Urinary, incontinence, stress female        Past Surgical History:   Procedure Laterality Date   • BREAST BIOPSY Left    • CORONARY ARTERY BYPASS GRAFT  2011   • CORONARY ARTERY BYPASS GRAFT  2011    triple bypass @ [facility]   • HYSTERECTOMY     • KNEE SURGERY Left      cartilage removal   • HI TOTAL KNEE ARTHROPLASTY Left 3/29/2016    Procedure: LEFT TOTAL KNEE ARTHROPLASTY placement of hemovac drain;  Surgeon: Tj Barraza MD;  Location: [facility];  Service: Orthopedics       Social History     Socioeconomic History   • Marital status:    Tobacco Use   • Smoking status: Never Smoker   • Smokeless tobacco: Never Used   Substance and Sexual Activity   • Alcohol use: No   • Drug use: No   • Sexual activity: Defer       Family History   Problem Relation Age of Onset   • Heart disease Mother 50   • Coronary artery disease Other    • Heart attack Father 50   • Arrhythmia Sister    • Breast cancer Neg Hx            ECG 12 Lead    Date/Time: 3/10/2022 9:58 AM  Performed by: Antony Galvin MD  Authorized by: Antony Galvin MD   Comparison: compared with previous ECG from 7/20/2018  Similar to previous ECG  Rhythm: sinus rhythm  Rate: normal  Conduction: conduction normal  ST Segments: ST segments normal  T Waves: T waves normal  QRS axis: normal  Other: no other findings    Clinical impression: normal ECG               Objective:     There were no vitals taken for this visit. There is no height or weight on file to calculate BMI.     Constitutional:       General: Not in acute distress.     Appearance: Well-developed. Not diaphoretic.   Eyes:      Pupils: Pupils are equal, round, and reactive to light.   HENT:      Head: Normocephalic and atraumatic.   Neck:      Thyroid: No thyromegaly.   Pulmonary:      Effort: Pulmonary effort is normal. No respiratory distress.      Breath sounds: Normal breath sounds. No wheezing. No rales.   Chest:      Chest  wall: Not tender to palpatation.   Cardiovascular:      Normal rate. Regular rhythm.      No gallop.   Pulses:     Intact distal pulses.   Edema:     Peripheral edema present.     Pretibial: bilateral 1+ edema of the pretibial area.     Ankle: bilateral 1+ edema of the ankle.  Abdominal:      General: Bowel sounds are normal. There is no distension.      Palpations: Abdomen is soft.      Tenderness: There is no guarding.   Musculoskeletal: Normal range of motion.         General: No deformity.      Cervical back: Normal range of motion and neck supple. Skin:     General: Skin is warm and dry.      Findings: No rash.   Neurological:      Mental Status: Alert and oriented to person, place, and time.      Cranial Nerves: No cranial nerve deficit.      Deep Tendon Reflexes: Reflexes are normal and symmetric.   Psychiatric:         Judgment: Judgment normal.         Review Of Data: I have reviewed pertinent recent labs, images and documents and pertinent findings included in HPI or assessment below.    Lipid Panel    Lipid Panel 7/13/21 2/15/22   Total Cholesterol 211 (A) 207 (A)   Triglycerides 127 116   HDL Cholesterol 58 61   VLDL Cholesterol 23 21   LDL Cholesterol  130 (A) 125 (A)   LDL/HDL Ratio  2.0   (A) Abnormal value       Comments are available for some flowsheets but are not being displayed.               Assessment/Plan:         Exertional shortness of breath: Which could be anginal equivalent as she had similar symptom before CABG in 2011.  -Get myocardial perfusion study and echocardiogram    Bilateral lower extremity swelling-  chronic and unchanged-encouraged supportive care with compression stocking during daytime and leg elevation at nighttime.    Summary of prior medical problems    1. Hx of CAD s/p CABGX3 in 2011 with LIMA to LAD, SVG to OM and SVG to PDA.  Normal myocardial perfusion study in 2016.   2.  Hypercholesterolemia-most recent lipid panel in February 2022 with LDL of 125  On simvastatin 10  mg  Unremarkable CMP  3.  Hypertension-on Coreg and losartan/hydrochlorthiazide-blood pressure at goal  4.  History of paroxysmal SVT in the perioop periord in 2011-remains in sinus rhythm  5.  Diagnosed with obstructive sleep apnea in the past but could not tolerate CPAP machine.  She uses nasal strips during sleep.  6.  Chronic degenerative joint disease-left knee replaced.  Takes Tylenol for pain    I have switched simvastatin to high intensity statin with atorvastatin.  Continue other current medications otherwise.    Diagnosis and plan of care discussed with patient and verbalized understanding.            Your medication list          Accurate as of March 10, 2022 10:02 AM. If you have any questions, ask your nurse or doctor.            CONTINUE taking these medications      Instructions Last Dose Given Next Dose Due   acetaminophen 650 MG 8 hr tablet  Commonly known as: TYLENOL      Take 650 mg by mouth Every 8 (Eight) Hours As Needed.       aspirin 81 MG tablet      Take 81 mg by mouth every night.       carvedilol 6.25 MG tablet  Commonly known as: COREG      TAKE ONE TABLET BY MOUTH TWICE A DAY WITH MEALS       CO ENZYME Q-10 PO      Take 100 mg by mouth 2 (two) times a day.       Echinacea Goldenseal Plus capsule      Take 900 mg by mouth every morning.       fexofenadine 60 MG tablet  Commonly known as: ALLEGRA      Take 60 mg by mouth Daily.       Flaxseed Oil 1200 MG capsule      Take 2 capsules by mouth daily.       Ginkgo Biloba Extract 60 MG capsule      Take 1 tablet by mouth every morning.       Glucosamine Chondr 1500 Complx capsule      Take 2 capsules by mouth every morning.       levothyroxine 50 MCG tablet  Commonly known as: SYNTHROID, LEVOTHROID      TAKE ONE TABLET BY MOUTH EVERY MORNING       losartan-hydrochlorothiazide 100-25 MG per tablet  Commonly known as: HYZAAR      TAKE ONE TABLET BY MOUTH DAILY       MULTIVITAMINS PO      Take 1 tablet by mouth daily.       omeprazole 40 MG  capsule  Commonly known as: priLOSEC      TAKE ONE CAPSULE BY MOUTH TWICE A DAY       Potassium Gluconate 595 MG capsule      Take 595 mcg/day by mouth every morning.       ALLY-e 200 MG tablet delayed-release      Take  by mouth 2 (Two) Times a Day.       sertraline 50 MG tablet  Commonly known as: Zoloft      Take 1 tablet by mouth Daily.       simvastatin 10 MG tablet  Commonly known as: ZOCOR      TAKE ONE TABLET BY MOUTH ONCE NIGHTLY       VITAMIN B COMPLEX IJ      Take 1 tablet by mouth 2 (two) times a day.       Vitamin D-3 25 MCG (1000 UT) capsule      Take 1,000 Units by mouth every night.                  Antony Galvin MD  03/10/22  10:02 EST

## 2022-03-24 ENCOUNTER — HOSPITAL ENCOUNTER (OUTPATIENT)
Dept: CARDIOLOGY | Facility: HOSPITAL | Age: 77
Discharge: HOME | End: 2022-03-24

## 2022-03-24 ENCOUNTER — HOSPITAL ENCOUNTER (OUTPATIENT)
Dept: NUCLEAR MEDICINE | Facility: HOSPITAL | Age: 77
Discharge: HOME | End: 2022-03-24

## 2022-03-24 ENCOUNTER — HOSPITAL ENCOUNTER (OUTPATIENT)
Dept: CARDIOLOGY | Facility: HOSPITAL | Age: 77
Discharge: HOME | End: 2022-03-24
Admitting: INTERNAL MEDICINE

## 2022-03-24 VITALS
BODY MASS INDEX: 31.59 KG/M2 | HEIGHT: 65 IN | DIASTOLIC BLOOD PRESSURE: 80 MMHG | WEIGHT: 189.6 LBS | SYSTOLIC BLOOD PRESSURE: 152 MMHG | HEART RATE: 68 BPM

## 2022-03-24 DIAGNOSIS — R06.02 EXERTIONAL SHORTNESS OF BREATH: ICD-10-CM

## 2022-03-24 LAB
BH CV ECHO MEAS - AO MAX PG: 9.2 MMHG
BH CV ECHO MEAS - AO MEAN PG: 5.9 MMHG
BH CV ECHO MEAS - AO V2 MAX: 151.3 CM/SEC
BH CV ECHO MEAS - AO V2 VTI: 37.9 CM
BH CV ECHO MEAS - AVA(I,D): 1.53 CM2
BH CV ECHO MEAS - EDV(CUBED): 65 ML
BH CV ECHO MEAS - EDV(MOD-SP2): 96.2 ML
BH CV ECHO MEAS - EDV(MOD-SP4): 74.6 ML
BH CV ECHO MEAS - EF(MOD-BP): 69.7 %
BH CV ECHO MEAS - EF(MOD-SP2): 75.9 %
BH CV ECHO MEAS - EF(MOD-SP4): 63.1 %
BH CV ECHO MEAS - ESV(CUBED): 18.8 ML
BH CV ECHO MEAS - ESV(MOD-SP2): 23.2 ML
BH CV ECHO MEAS - ESV(MOD-SP4): 27.5 ML
BH CV ECHO MEAS - FS: 33.9 %
BH CV ECHO MEAS - IVS/LVPW: 1.12 CM
BH CV ECHO MEAS - IVSD: 0.9 CM
BH CV ECHO MEAS - LAT PEAK E' VEL: 7.6 CM/SEC
BH CV ECHO MEAS - LV DIASTOLIC VOL/BSA (35-75): 38.6 CM2
BH CV ECHO MEAS - LV MASS(C)D: 102.5 GRAMS
BH CV ECHO MEAS - LV MAX PG: 2.32 MMHG
BH CV ECHO MEAS - LV MEAN PG: 1.29 MMHG
BH CV ECHO MEAS - LV SYSTOLIC VOL/BSA (12-30): 14.2 CM2
BH CV ECHO MEAS - LV V1 MAX: 76.2 CM/SEC
BH CV ECHO MEAS - LV V1 VTI: 20 CM
BH CV ECHO MEAS - LVIDD: 4 CM
BH CV ECHO MEAS - LVIDS: 2.7 CM
BH CV ECHO MEAS - LVOT AREA: 2.9 CM2
BH CV ECHO MEAS - LVOT DIAM: 1.92 CM
BH CV ECHO MEAS - LVPWD: 0.81 CM
BH CV ECHO MEAS - MED PEAK E' VEL: 5.8 CM/SEC
BH CV ECHO MEAS - MV A DUR: 0.13 SEC
BH CV ECHO MEAS - MV A MAX VEL: 119.1 CM/SEC
BH CV ECHO MEAS - MV DEC SLOPE: 1015 CM/SEC2
BH CV ECHO MEAS - MV DEC TIME: 0.19 MSEC
BH CV ECHO MEAS - MV E MAX VEL: 110 CM/SEC
BH CV ECHO MEAS - MV E/A: 0.92
BH CV ECHO MEAS - MV MEAN PG: 2.6 MMHG
BH CV ECHO MEAS - MV V2 VTI: 43.2 CM
BH CV ECHO MEAS - MVA(VTI): 1.35 CM2
BH CV ECHO MEAS - PA ACC TIME: 0.15 SEC
BH CV ECHO MEAS - PA PR(ACCEL): 10.1 MMHG
BH CV ECHO MEAS - PA V2 MAX: 107.8 CM/SEC
BH CV ECHO MEAS - PULM DIAS VEL: 53.3 CM/SEC
BH CV ECHO MEAS - PULM SYS VEL: 51.4 CM/SEC
BH CV ECHO MEAS - RAP SYSTOLE: 3 MMHG
BH CV ECHO MEAS - RV V1 VTI: 15.3 CM
BH CV ECHO MEAS - RVOT DIAM: 2.6 CM
BH CV ECHO MEAS - RVSP: 24.5 MMHG
BH CV ECHO MEAS - SI(MOD-SP2): 37.8 ML/M2
BH CV ECHO MEAS - SI(MOD-SP4): 24.4 ML/M2
BH CV ECHO MEAS - SV(LVOT): 58.1 ML
BH CV ECHO MEAS - SV(MOD-SP2): 73 ML
BH CV ECHO MEAS - SV(MOD-SP4): 47.1 ML
BH CV ECHO MEAS - SV(RVOT): 80.9 ML
BH CV ECHO MEAS - TAPSE (>1.6): 1.55 CM
BH CV ECHO MEAS - TR MAX PG: 21.5 MMHG
BH CV ECHO MEAS - TR MAX VEL: 231.9 CM/SEC
BH CV ECHO MEASUREMENTS AVERAGE E/E' RATIO: 16.42
BH CV REST NUCLEAR ISOTOPE DOSE: 11.2 MCI
BH CV STRESS BP STAGE 1: NORMAL
BH CV STRESS COMMENTS STAGE 1: NORMAL
BH CV STRESS DOSE REGADENOSON STAGE 1: 0.4
BH CV STRESS DURATION MIN STAGE 1: 0
BH CV STRESS DURATION SEC STAGE 1: 30
BH CV STRESS HR STAGE 1: 102
BH CV STRESS NUCLEAR ISOTOPE DOSE: 35.7 MCI
BH CV STRESS O2 STAGE 1: 97
BH CV STRESS PROTOCOL 1: NORMAL
BH CV STRESS RECOVERY BP: NORMAL MMHG
BH CV STRESS RECOVERY HR: 105 BPM
BH CV STRESS RECOVERY O2: 94 %
BH CV STRESS STAGE 1: 1
BH CV XLRA - RV BASE: 3.2 CM
BH CV XLRA - RV LENGTH: 6.8 CM
BH CV XLRA - RV MID: 2.23 CM
BH CV XLRA - TDI S': 10.6 CM/SEC
LEFT ATRIUM VOLUME INDEX: 35.3 ML/M2
LV EF 2D ECHO EST: 70 %
LV EF NUC BP: 60 %
MAXIMAL PREDICTED HEART RATE: 145 BPM
MAXIMAL PREDICTED HEART RATE: 145 BPM
PERCENT MAX PREDICTED HR: 85.52 %
STRESS BASELINE BP: NORMAL MMHG
STRESS BASELINE HR: 65 BPM
STRESS O2 SAT REST: 94 %
STRESS PERCENT HR: 101 %
STRESS POST ESTIMATED WORKLOAD: 1 METS
STRESS POST EXERCISE DUR SEC: 30 SEC
STRESS POST O2 SAT PEAK: 95 %
STRESS POST PEAK BP: NORMAL MMHG
STRESS POST PEAK HR: 124 BPM
STRESS TARGET HR: 123 BPM
STRESS TARGET HR: 123 BPM

## 2022-03-24 PROCEDURE — A9500 TC99M SESTAMIBI: HCPCS | Performed by: INTERNAL MEDICINE

## 2022-03-24 PROCEDURE — 93306 TTE W/DOPPLER COMPLETE: CPT

## 2022-03-24 PROCEDURE — 25010000002 REGADENOSON 0.4 MG/5ML SOLUTION: Performed by: INTERNAL MEDICINE

## 2022-03-24 PROCEDURE — 78452 HT MUSCLE IMAGE SPECT MULT: CPT | Performed by: INTERNAL MEDICINE

## 2022-03-24 PROCEDURE — 93018 CV STRESS TEST I&R ONLY: CPT | Performed by: INTERNAL MEDICINE

## 2022-03-24 PROCEDURE — 0 TECHNETIUM SESTAMIBI: Performed by: INTERNAL MEDICINE

## 2022-03-24 PROCEDURE — 93016 CV STRESS TEST SUPVJ ONLY: CPT | Performed by: INTERNAL MEDICINE

## 2022-03-24 PROCEDURE — 93306 TTE W/DOPPLER COMPLETE: CPT | Performed by: INTERNAL MEDICINE

## 2022-03-24 PROCEDURE — 78452 HT MUSCLE IMAGE SPECT MULT: CPT

## 2022-03-24 PROCEDURE — 93017 CV STRESS TEST TRACING ONLY: CPT

## 2022-03-24 RX ADMIN — REGADENOSON 0.4 MG: 0.08 INJECTION, SOLUTION INTRAVENOUS at 08:06

## 2022-03-24 RX ADMIN — TECHNETIUM TC 99M SESTAMIBI 1 DOSE: 1 INJECTION INTRAVENOUS at 08:06

## 2022-03-24 RX ADMIN — TECHNETIUM TC 99M SESTAMIBI 1 DOSE: 1 INJECTION INTRAVENOUS at 06:52

## 2022-03-25 ENCOUNTER — TELEPHONE (OUTPATIENT)
Dept: CARDIOLOGY | Facility: CLINIC | Age: 77
End: 2022-03-25

## 2022-03-25 RX ORDER — CLOPIDOGREL BISULFATE 75 MG/1
75 TABLET ORAL DAILY
Qty: 90 TABLET | Refills: 3 | Status: SHIPPED | OUTPATIENT
Start: 2022-03-25 | End: 2022-09-28

## 2022-03-25 RX ORDER — CARVEDILOL 6.25 MG/1
12.5 TABLET ORAL 2 TIMES DAILY WITH MEALS
Qty: 180 TABLET | Refills: 3
Start: 2022-03-25 | End: 2022-04-21 | Stop reason: SDUPTHER

## 2022-03-25 RX ORDER — PANTOPRAZOLE SODIUM 20 MG/1
20 TABLET, DELAYED RELEASE ORAL DAILY
Qty: 90 TABLET | Refills: 3 | Status: SHIPPED | OUTPATIENT
Start: 2022-03-25 | End: 2022-09-28 | Stop reason: SDUPTHER

## 2022-03-25 NOTE — TELEPHONE ENCOUNTER
Called and discussed results and recommendations with patient in great detail.  She denies any chest pain or discomfort symptoms.  Gets some occasional shortness of breath with heavy exertion only.  She would like to try medical therapy, as outlined by MD above.    We discussed all medication changes in great detail and patient wrote all of these down.  She already switched from the atorvastatin to the simvastatin.  She is going to monitor blood pressure daily and bring log with her to her appointment in 2 weeks or call sooner for any low heart rate or blood pressure readings, parameters given.  We will also look at rechecking CMP in 2 weeks, as above.    Patient needs to be seen in the [place] office.  We will schedule her to see Dr. Galvin or [facility] in 2 weeks.  She will call sooner for any new or worsening symptoms or concerns.  ER if severe or unrelieved.  Discussed in detail signs/symptoms that warrant sooner call or follow-up to the office.

## 2022-03-25 NOTE — TELEPHONE ENCOUNTER
----- Message from Antony Galvin MD sent at 3/25/2022  9:21 AM EDT -----  I have reviewed tests. Echo normal LVEF but abnormal diastolic function.   Stress test with mild ischemia in a small artery territory and I have made adjustments to meds  -increased coreg to 12.5 bid from 6.25- She needs to monitor BP very closely and bring logs - hold if bp or HR drops too much   - switched aspirin to plavix- stop asa and start plavix. Sent to pharmacy  -stopped omeprazole as it interacts with plavix and sent protonix to pharmacy   Come to clinic in 2 weeks with BP readings - either of us can see her   I have already switched simva to atorva   With persistent symptoms She will get heart cath   CMP in 2 weeks   I will copy [place] for scheduling   Let me know   Thanks!

## 2022-03-25 NOTE — PROGRESS NOTES
I have reviewed tests. Echo normal LVEF but abnormal diastolic function.   Stress test with mild ischemia in a small artery territory and I have made adjustments to meds  -increased coreg to 12.5 bid from 6.25- She needs to monitor BP very closely and bring logs - hold if bp or HR drops too much   - switched aspirin to plavix- stop asa and start plavix. Sent to pharmacy  -stopped omeprazole as it interacts with plavix and sent protonix to pharmacy   Come to clinic in 2 weeks with BP readings - either of us can see her   I have already switched simva to atorva   With persistent symptoms She will get heart cath   CMP in 2 weeks   I will copy Indira for scheduling   Let me know   Thanks!

## 2022-03-29 NOTE — TELEPHONE ENCOUNTER
Spoke with pt and clarified with her that she was to stop the ASA, and start Plavix, and increase her coreg to 2 6.25mg tablet BID.  Also reiterated if her BP's dropped too low to hold the coreg but continue taking the plavix.    She verbalized understanding.

## 2022-04-05 ENCOUNTER — TELEPHONE (OUTPATIENT)
Dept: CARDIOLOGY | Facility: CLINIC | Age: 77
End: 2022-04-05

## 2022-04-05 NOTE — TELEPHONE ENCOUNTER
Patient did not show up for office visit today.  Please reschedule to see me within the next week.  If unable to come to [place] office then please see if she can see Dr. Galvin or Shukri at [place] office.

## 2022-04-21 RX ORDER — CARVEDILOL 6.25 MG/1
12.5 TABLET ORAL 2 TIMES DAILY WITH MEALS
Qty: 180 TABLET | Refills: 3 | Status: SHIPPED | OUTPATIENT
Start: 2022-04-21 | End: 2022-10-20

## 2022-04-21 NOTE — TELEPHONE ENCOUNTER
[phone]  10:47 am    Pt called stating her carvedilol was increased and she is about to run out.  Refill sent.    [facility]

## 2022-04-28 ENCOUNTER — OFFICE VISIT (OUTPATIENT)
Dept: CARDIOLOGY | Facility: CLINIC | Age: 77
End: 2022-04-28

## 2022-04-28 VITALS
WEIGHT: 189 LBS | SYSTOLIC BLOOD PRESSURE: 130 MMHG | HEIGHT: 65 IN | HEART RATE: 75 BPM | DIASTOLIC BLOOD PRESSURE: 78 MMHG | BODY MASS INDEX: 31.49 KG/M2

## 2022-04-28 DIAGNOSIS — I25.810 CORONARY ARTERY DISEASE INVOLVING CORONARY BYPASS GRAFT OF NATIVE HEART WITHOUT ANGINA PECTORIS: Primary | ICD-10-CM

## 2022-04-28 DIAGNOSIS — I10 PRIMARY HYPERTENSION: ICD-10-CM

## 2022-04-28 DIAGNOSIS — E78.2 MIXED HYPERLIPIDEMIA: ICD-10-CM

## 2022-04-28 DIAGNOSIS — I47.1 SVT (SUPRAVENTRICULAR TACHYCARDIA): ICD-10-CM

## 2022-04-28 PROCEDURE — 99214 OFFICE O/P EST MOD 30 MIN: CPT | Performed by: INTERNAL MEDICINE

## 2022-04-28 NOTE — PROGRESS NOTES
PATIENTINFORMATION    Date of Office Visit: 2022  Encounter Provider: Antony Galvin MD  Place of Service: [facility] CARDIOLOGY  Patient Name: Carla Serrano  : 1945    Subjective:     Encounter Date:2022      Patient ID: Carla Serrano is a 77 y.o. female.    No chief complaint on file.    HPI  Ms. Serrano is a pleasant 78 yo lady who is here for fu visit. She denies any chest pain or sob since last visit. She has chronic bilateral lower extremity swelling that has not changed much.  She had stress test on .  She is compliant with current medical regimen and denies any significant side effects from medications. No ER visit or hospitalization since last clinic visit.       ROS  All systems reviewed and negative except as noted in HPI.    Past Medical History:   Diagnosis Date   • Allergic rhinitis    • Anemia    • Arthritis    • CAD (coronary artery disease)     h/o CABG x3   • Cardiomyopathy (HCC)    • Degenerative joint disease of knee, left     sched replacement   • Edema    • GERD (gastroesophageal reflux disease)    • Heart disease    • Hiatal hernia    • Hyperlipidemia    • Hypertension    • Hypothyroidism    • Incontinence in female 8/10/2017   • JOVANNA (obstructive sleep apnea)     no machine, uses nasal strips   • Osteoporosis    • Prediabetes    • SVT (supraventricular tachycardia) ([facility])    • Urinary, incontinence, stress female        Past Surgical History:   Procedure Laterality Date   • BREAST BIOPSY Left    • CORONARY ARTERY BYPASS GRAFT     • CORONARY ARTERY BYPASS GRAFT      triple bypass @ [facility]   • HYSTERECTOMY     • KNEE SURGERY Left      cartilage removal   • [place] TOTAL KNEE ARTHROPLASTY Left 3/29/2016    Procedure: LEFT TOTAL KNEE ARTHROPLASTY placement of hemovac drain;  Surgeon: Tj Barraza MD;  Location: [facility];  Service: Orthopedics       Social History     Socioeconomic History   • Marital status:   "  Tobacco Use   • Smoking status: Never Smoker   • Smokeless tobacco: Never Used   Substance and Sexual Activity   • Alcohol use: No   • Drug use: No   • Sexual activity: Defer       Family History   Problem Relation Age of Onset   • Heart disease Mother 50   • Coronary artery disease Other    • Heart attack Father 50   • Arrhythmia Sister    • Breast cancer Neg Hx          Procedures       Objective:     /78   Pulse 75   Ht 165 cm (64.96\")   Wt 85.7 kg (189 lb)   BMI 31.49 kg/m²  Body mass index is 31.49 kg/m².     Constitutional:       General: Not in acute distress.     Appearance: Well-developed. Not diaphoretic.   Eyes:      Pupils: Pupils are equal, round, and reactive to light.   HENT:      Head: Normocephalic and atraumatic.   Neck:      Thyroid: No thyromegaly.   Pulmonary:      Effort: Pulmonary effort is normal. No respiratory distress.      Breath sounds: Normal breath sounds. No wheezing. No rales.   Chest:      Chest wall: Not tender to palpatation.   Cardiovascular:      Normal rate. Regular rhythm.      No gallop.   Pulses:     Intact distal pulses.   Edema:     Pretibial: bilateral 1+ edema of the pretibial area.     Ankle: bilateral 1+ edema of the ankle.  Abdominal:      General: Bowel sounds are normal. There is no distension.      Palpations: Abdomen is soft.      Tenderness: There is no guarding.   Musculoskeletal: Normal range of motion.         General: No deformity.      Cervical back: Normal range of motion and neck supple. Skin:     General: Skin is warm and dry.      Findings: No rash.   Neurological:      Mental Status: Alert and oriented to person, place, and time.      Cranial Nerves: No cranial nerve deficit.      Deep Tendon Reflexes: Reflexes are normal and symmetric.   Psychiatric:         Judgment: Judgment normal.         Review Of Data: I have reviewed pertinent recent labs, images and documents and pertinent findings included in HPI or assessment below.    Lipid Panel "    Lipid Panel 7/13/21 2/15/22   Total Cholesterol 211 (A) 207 (A)   Triglycerides 127 116   HDL Cholesterol 58 61   VLDL Cholesterol 23 21   LDL Cholesterol  130 (A) 125 (A)   LDL/HDL Ratio  2.0   (A) Abnormal value       Comments are available for some flowsheets but are not being displayed.               Assessment/Plan:         1. Hx of CAD s/p CABGX3 in 2011 with LIMA to LAD, SVG to OM and SVG to PDA.    Myocardial perfusion study on 3/24/2022 showed small inferolateral scar with mild cora-infarct ischemia  Patient denies any significant chest pain or anginal equivalent symptoms  Continue care with beta-blocker, high intensity statin and Plavix.  Echocardiogram shows preserved left ventricular ejection fraction.  2.  Hypercholesterolemia-switched to high intensity statin with atorvastatin 40 from simvastatin during prior visit  Check lipid panel and CMP during next follow-up visit  3.  Hypertension-on Coreg and losartan/hydrochlorthiazide-blood pressure at goal  4.  History of paroxysmal SVT in the perioop periord in 2011-remains in sinus rhythm  5.  Diagnosed with obstructive sleep apnea in the past but could not tolerate CPAP machine.  She uses nasal strips during sleep.  6.  Chronic degenerative joint disease-left knee replaced.  Takes Tylenol for pain  7.  Bilateral lower extremity pitting edema without other evidence for heart failure: She will try wearing compression stocking during daytime and elevate leg at night.    Return to clinic in 6 months or sooner with any concerning symptoms  Diagnosis and plan of care discussed with patient and verbalized understanding.            Your medication list          Accurate as of April 28, 2022  4:02 PM. If you have any questions, ask your nurse or doctor.            CONTINUE taking these medications      Instructions Last Dose Given Next Dose Due   acetaminophen 650 MG 8 hr tablet  Commonly known as: TYLENOL      Take 650 mg by mouth Every 8 (Eight) Hours As  Needed.       atorvastatin 40 MG tablet  Commonly known as: LIPITOR      Take 0.5 tablets by mouth Daily.       carvedilol 6.25 MG tablet  Commonly known as: COREG      Take 2 tablets by mouth 2 (Two) Times a Day With Meals.       clopidogrel 75 MG tablet  Commonly known as: PLAVIX      Take 1 tablet by mouth Daily.       CO ENZYME Q-10 PO      Take 100 mg by mouth 2 (two) times a day.       Echinacea Goldenseal Plus capsule      Take 900 mg by mouth every morning.       fexofenadine 60 MG tablet  Commonly known as: ALLEGRA      Take 60 mg by mouth Daily.       Flaxseed Oil 1200 MG capsule      Take 2 capsules by mouth daily.       Ginkgo Biloba Extract 60 MG capsule      Take 1 tablet by mouth every morning.       Glucosamine Chondr 1500 Complx capsule      Take 2 capsules by mouth every morning.       levothyroxine 50 MCG tablet  Commonly known as: SYNTHROID, LEVOTHROID      TAKE ONE TABLET BY MOUTH EVERY MORNING       losartan-hydrochlorothiazide 100-25 MG per tablet  Commonly known as: HYZAAR      TAKE ONE TABLET BY MOUTH DAILY       MULTIVITAMINS PO      Take 1 tablet by mouth daily.       pantoprazole 20 MG EC tablet  Commonly known as: Protonix      Take 1 tablet by mouth Daily.       Potassium Gluconate 595 MG capsule      Take 595 mcg/day by mouth every morning.       ALLY-e 200 MG tablet delayed-release      Take  by mouth 2 (Two) Times a Day.       sertraline 50 MG tablet  Commonly known as: Zoloft      Take 1 tablet by mouth Daily.       VITAMIN B COMPLEX IJ      Take 1 tablet by mouth 2 (two) times a day.       Vitamin D-3 25 MCG (1000 UT) capsule      Take 1,000 Units by mouth every night.                  Antony Galvin MD  04/28/22  16:02 EDT

## 2022-08-17 DIAGNOSIS — E03.9 ACQUIRED HYPOTHYROIDISM: ICD-10-CM

## 2022-08-17 RX ORDER — LEVOTHYROXINE SODIUM 0.05 MG/1
TABLET ORAL
Qty: 90 TABLET | Refills: 1 | Status: SHIPPED | OUTPATIENT
Start: 2022-08-17 | End: 2023-02-07

## 2022-08-17 NOTE — TELEPHONE ENCOUNTER
Rx Refill Note  Requested Prescriptions     Pending Prescriptions Disp Refills   • levothyroxine (SYNTHROID, LEVOTHROID) 50 MCG tablet [Pharmacy Med Name: LEVOTHYROXINE 50 MCG TABLET] 90 tablet 1     Sig: TAKE ONE TABLET BY MOUTH EVERY MORNING      Last office visit with prescribing clinician: 2/22/2022      Next office visit with prescribing clinician: 8/29/2022            Lynda Esquivel MA  08/17/22, 16:33 EDT

## 2022-08-21 NOTE — TELEPHONE ENCOUNTER
PATIENT STATES THAT SHE WAS ADVISED TO START TAKING sertraline (Zoloft) 25 MG tablet TONIGHT HOWEVER SHE WOULD LIKE TO KNOW IF SHE IS TO CONTINUE TAKING OTC ALLY-E AS WELL    PLEASE ADVISE    PATIENT CAN BE REACHED AT  [phone]    Self

## 2022-08-22 DIAGNOSIS — E78.2 MIXED HYPERLIPIDEMIA: ICD-10-CM

## 2022-08-22 DIAGNOSIS — I25.10 CORONARY ARTERY DISEASE INVOLVING NATIVE CORONARY ARTERY OF NATIVE HEART WITHOUT ANGINA PECTORIS: ICD-10-CM

## 2022-08-22 DIAGNOSIS — E03.9 ACQUIRED HYPOTHYROIDISM: Primary | ICD-10-CM

## 2022-08-23 DIAGNOSIS — I25.10 CORONARY ARTERY DISEASE INVOLVING NATIVE CORONARY ARTERY OF NATIVE HEART WITHOUT ANGINA PECTORIS: ICD-10-CM

## 2022-08-23 DIAGNOSIS — E78.2 MIXED HYPERLIPIDEMIA: ICD-10-CM

## 2022-08-23 DIAGNOSIS — E03.9 ACQUIRED HYPOTHYROIDISM: ICD-10-CM

## 2022-08-24 LAB
ALBUMIN SERPL-MCNC: 4.2 G/DL (ref 3.7–4.7)
ALBUMIN/GLOB SERPL: 2 {RATIO} (ref 1.2–2.2)
ALP SERPL-CCNC: 58 IU/L (ref 44–121)
ALT SERPL-CCNC: 21 IU/L (ref 0–32)
AST SERPL-CCNC: 17 IU/L (ref 0–40)
BASOPHILS # BLD AUTO: 0 X10E3/UL (ref 0–0.2)
BASOPHILS NFR BLD AUTO: 1 %
BILIRUB SERPL-MCNC: 0.5 MG/DL (ref 0–1.2)
BUN SERPL-MCNC: 15 MG/DL (ref 8–27)
BUN/CREAT SERPL: 20 (ref 12–28)
CALCIUM SERPL-MCNC: 10 MG/DL (ref 8.7–10.3)
CHLORIDE SERPL-SCNC: 102 MMOL/L (ref 96–106)
CHOLEST SERPL-MCNC: 182 MG/DL (ref 100–199)
CO2 SERPL-SCNC: 26 MMOL/L (ref 20–29)
CREAT SERPL-MCNC: 0.75 MG/DL (ref 0.57–1)
EGFRCR-CYS SERPLBLD CKD-EPI 2021: 82 ML/MIN/1.73
EOSINOPHIL # BLD AUTO: 0.1 X10E3/UL (ref 0–0.4)
EOSINOPHIL NFR BLD AUTO: 2 %
ERYTHROCYTE [DISTWIDTH] IN BLOOD BY AUTOMATED COUNT: 12.2 % (ref 11.7–15.4)
GLOBULIN SER CALC-MCNC: 2.1 G/DL (ref 1.5–4.5)
GLUCOSE SERPL-MCNC: 86 MG/DL (ref 65–99)
HCT VFR BLD AUTO: 35.7 % (ref 34–46.6)
HDLC SERPL-MCNC: 58 MG/DL
HGB BLD-MCNC: 12.2 G/DL (ref 11.1–15.9)
IMM GRANULOCYTES # BLD AUTO: 0 X10E3/UL (ref 0–0.1)
IMM GRANULOCYTES NFR BLD AUTO: 0 %
LDLC SERPL CALC-MCNC: 101 MG/DL (ref 0–99)
LDLC/HDLC SERPL: 1.7 RATIO (ref 0–3.2)
LYMPHOCYTES # BLD AUTO: 0.8 X10E3/UL (ref 0.7–3.1)
LYMPHOCYTES NFR BLD AUTO: 28 %
MCH RBC QN AUTO: 33.6 PG (ref 26.6–33)
MCHC RBC AUTO-ENTMCNC: 34.2 G/DL (ref 31.5–35.7)
MCV RBC AUTO: 98 FL (ref 79–97)
MONOCYTES # BLD AUTO: 0.3 X10E3/UL (ref 0.1–0.9)
MONOCYTES NFR BLD AUTO: 10 %
NEUTROPHILS # BLD AUTO: 1.7 X10E3/UL (ref 1.4–7)
NEUTROPHILS NFR BLD AUTO: 59 %
PLATELET # BLD AUTO: 217 X10E3/UL (ref 150–450)
POTASSIUM SERPL-SCNC: 3.7 MMOL/L (ref 3.5–5.2)
PROT SERPL-MCNC: 6.3 G/DL (ref 6–8.5)
RBC # BLD AUTO: 3.63 X10E6/UL (ref 3.77–5.28)
SODIUM SERPL-SCNC: 141 MMOL/L (ref 134–144)
T4 FREE SERPL-MCNC: 1.24 NG/DL (ref 0.82–1.77)
TRIGL SERPL-MCNC: 132 MG/DL (ref 0–149)
TSH SERPL DL<=0.005 MIU/L-ACNC: 1.33 UIU/ML (ref 0.45–4.5)
VLDLC SERPL CALC-MCNC: 23 MG/DL (ref 5–40)
WBC # BLD AUTO: 2.9 X10E3/UL (ref 3.4–10.8)

## 2022-08-29 ENCOUNTER — OFFICE VISIT (OUTPATIENT)
Dept: INTERNAL MEDICINE | Facility: CLINIC | Age: 77
End: 2022-08-29

## 2022-08-29 VITALS
HEIGHT: 65 IN | HEART RATE: 90 BPM | SYSTOLIC BLOOD PRESSURE: 140 MMHG | TEMPERATURE: 97.5 F | OXYGEN SATURATION: 94 % | WEIGHT: 186.8 LBS | BODY MASS INDEX: 31.12 KG/M2 | DIASTOLIC BLOOD PRESSURE: 86 MMHG

## 2022-08-29 DIAGNOSIS — E78.2 MIXED HYPERLIPIDEMIA: ICD-10-CM

## 2022-08-29 DIAGNOSIS — K21.00 GASTROESOPHAGEAL REFLUX DISEASE WITH ESOPHAGITIS WITHOUT HEMORRHAGE: ICD-10-CM

## 2022-08-29 DIAGNOSIS — I25.10 CORONARY ARTERY DISEASE INVOLVING NATIVE CORONARY ARTERY OF NATIVE HEART WITHOUT ANGINA PECTORIS: ICD-10-CM

## 2022-08-29 DIAGNOSIS — I10 PRIMARY HYPERTENSION: ICD-10-CM

## 2022-08-29 DIAGNOSIS — E03.9 ACQUIRED HYPOTHYROIDISM: Primary | ICD-10-CM

## 2022-08-29 PROCEDURE — 99214 OFFICE O/P EST MOD 30 MIN: CPT | Performed by: INTERNAL MEDICINE

## 2022-09-07 ENCOUNTER — TELEPHONE (OUTPATIENT)
Dept: CARDIOLOGY | Facility: CLINIC | Age: 77
End: 2022-09-07

## 2022-09-07 NOTE — TELEPHONE ENCOUNTER
Pt is scheduled to have cataract surgery in Oct with Kassie under topical monitored anesthesia.  They are not needing pt to hold any medications but would like cardiac clearance for the cataract procedure    Pt was last seen on 4/28/22 by Dr. Galvin    I have placed form in your in box for sig

## 2022-09-27 ENCOUNTER — TELEPHONE (OUTPATIENT)
Dept: CARDIOLOGY | Facility: CLINIC | Age: 77
End: 2022-09-27

## 2022-09-27 NOTE — TELEPHONE ENCOUNTER
[phone]    Pt called and wants to make sure that you know that she has not been taking her plavix since aug 26th of 2022.  And she wanted you to know that since she is having cataract sx on October 10th.  Is she still cleared to have the surgery? Please advise.    Thanks  Yamini Romero

## 2022-09-28 RX ORDER — PANTOPRAZOLE SODIUM 40 MG/1
40 TABLET, DELAYED RELEASE ORAL DAILY
Qty: 90 TABLET | Refills: 3 | Status: SHIPPED | OUTPATIENT
Start: 2022-09-28 | End: 2022-09-30 | Stop reason: SDUPTHER

## 2022-09-28 RX ORDER — ASPIRIN 81 MG/1
81 TABLET ORAL DAILY
Qty: 90 TABLET | Refills: 3 | Status: SHIPPED | OUTPATIENT
Start: 2022-09-28

## 2022-09-28 RX ORDER — PANTOPRAZOLE SODIUM 20 MG/1
40 TABLET, DELAYED RELEASE ORAL DAILY
Qty: 90 TABLET | Refills: 3 | Status: SHIPPED | OUTPATIENT
Start: 2022-09-28 | End: 2022-09-28

## 2022-09-28 NOTE — PROGRESS NOTES
Patient reports stomach upset and feeling more tired since she started taking Plavix.  Symptoms improved after she stopped taking Plavix.  I have talked to her on the phone and she is agreeable to try aspirin with PPI.  She will call with recurrent symptoms.

## 2022-09-30 RX ORDER — PANTOPRAZOLE SODIUM 40 MG/1
40 TABLET, DELAYED RELEASE ORAL DAILY
Qty: 90 TABLET | Refills: 3 | Status: SHIPPED | OUTPATIENT
Start: 2022-09-30

## 2022-10-08 DIAGNOSIS — I10 ESSENTIAL HYPERTENSION: ICD-10-CM

## 2022-10-10 RX ORDER — LOSARTAN POTASSIUM AND HYDROCHLOROTHIAZIDE 25; 100 MG/1; MG/1
TABLET ORAL
Qty: 90 TABLET | Refills: 2 | Status: SHIPPED | OUTPATIENT
Start: 2022-10-10

## 2022-10-20 RX ORDER — CARVEDILOL 6.25 MG/1
TABLET ORAL
Qty: 180 TABLET | Refills: 3 | Status: SHIPPED | OUTPATIENT
Start: 2022-10-20

## 2022-10-27 ENCOUNTER — OFFICE VISIT (OUTPATIENT)
Dept: CARDIOLOGY | Facility: CLINIC | Age: 77
End: 2022-10-27

## 2022-10-27 ENCOUNTER — LAB (OUTPATIENT)
Dept: LAB | Facility: HOSPITAL | Age: 77
End: 2022-10-27

## 2022-10-27 VITALS
HEART RATE: 66 BPM | WEIGHT: 184 LBS | DIASTOLIC BLOOD PRESSURE: 88 MMHG | BODY MASS INDEX: 30.66 KG/M2 | HEIGHT: 65 IN | SYSTOLIC BLOOD PRESSURE: 130 MMHG

## 2022-10-27 DIAGNOSIS — R10.13 EPIGASTRIC ABDOMINAL PAIN: ICD-10-CM

## 2022-10-27 DIAGNOSIS — R06.02 EXERTIONAL SHORTNESS OF BREATH: ICD-10-CM

## 2022-10-27 DIAGNOSIS — R06.02 EXERTIONAL SHORTNESS OF BREATH: Primary | ICD-10-CM

## 2022-10-27 DIAGNOSIS — E78.2 MIXED HYPERLIPIDEMIA: ICD-10-CM

## 2022-10-27 DIAGNOSIS — I25.810 CORONARY ARTERY DISEASE INVOLVING CORONARY BYPASS GRAFT OF NATIVE HEART WITHOUT ANGINA PECTORIS: ICD-10-CM

## 2022-10-27 LAB
BASOPHILS # BLD AUTO: 0.04 10*3/MM3 (ref 0–0.2)
BASOPHILS NFR BLD AUTO: 1 % (ref 0–1.5)
DEPRECATED RDW RBC AUTO: 44.9 FL (ref 37–54)
EOSINOPHIL # BLD AUTO: 0.14 10*3/MM3 (ref 0–0.4)
EOSINOPHIL NFR BLD AUTO: 3.4 % (ref 0.3–6.2)
ERYTHROCYTE [DISTWIDTH] IN BLOOD BY AUTOMATED COUNT: 12.3 % (ref 12.3–15.4)
HCT VFR BLD AUTO: 37.3 % (ref 34–46.6)
HGB BLD-MCNC: 13 G/DL (ref 12–15.9)
IMM GRANULOCYTES # BLD AUTO: 0.01 10*3/MM3 (ref 0–0.05)
IMM GRANULOCYTES NFR BLD AUTO: 0.2 % (ref 0–0.5)
LYMPHOCYTES # BLD AUTO: 1.19 10*3/MM3 (ref 0.7–3.1)
LYMPHOCYTES NFR BLD AUTO: 28.9 % (ref 19.6–45.3)
MCH RBC QN AUTO: 34.3 PG (ref 26.6–33)
MCHC RBC AUTO-ENTMCNC: 34.9 G/DL (ref 31.5–35.7)
MCV RBC AUTO: 98.4 FL (ref 79–97)
MONOCYTES # BLD AUTO: 0.39 10*3/MM3 (ref 0.1–0.9)
MONOCYTES NFR BLD AUTO: 9.5 % (ref 5–12)
NEUTROPHILS NFR BLD AUTO: 2.35 10*3/MM3 (ref 1.7–7)
NEUTROPHILS NFR BLD AUTO: 57 % (ref 42.7–76)
NRBC BLD AUTO-RTO: 0 /100 WBC (ref 0–0.2)
NT-PROBNP SERPL-MCNC: 168 PG/ML (ref 0–1800)
PLATELET # BLD AUTO: 248 10*3/MM3 (ref 140–450)
PMV BLD AUTO: 9.4 FL (ref 6–12)
RBC # BLD AUTO: 3.79 10*6/MM3 (ref 3.77–5.28)
WBC NRBC COR # BLD: 4.12 10*3/MM3 (ref 3.4–10.8)

## 2022-10-27 PROCEDURE — 99214 OFFICE O/P EST MOD 30 MIN: CPT | Performed by: INTERNAL MEDICINE

## 2022-10-27 PROCEDURE — 36415 COLL VENOUS BLD VENIPUNCTURE: CPT

## 2022-10-27 PROCEDURE — 85025 COMPLETE CBC W/AUTO DIFF WBC: CPT

## 2022-10-27 PROCEDURE — 93000 ELECTROCARDIOGRAM COMPLETE: CPT | Performed by: INTERNAL MEDICINE

## 2022-10-27 PROCEDURE — 83880 ASSAY OF NATRIURETIC PEPTIDE: CPT

## 2022-10-27 RX ORDER — ISOSORBIDE MONONITRATE 30 MG/1
30 TABLET, EXTENDED RELEASE ORAL DAILY
Qty: 90 TABLET | Refills: 3 | Status: SHIPPED | OUTPATIENT
Start: 2022-10-27

## 2022-10-27 NOTE — PROGRESS NOTES
PATIENTINFORMATION    Date of Office Visit: 10/27/2022  Encounter Provider: Antony Galvin MD  Place of Service: [facility] CARDIOLOGY  Patient Name: Carla Serrano  : 1945    Subjective:     Encounter Date:10/27/2022      Patient ID: Carla Serrano is a 77 y.o. female.    Chief Complaint   Patient presents with   • Follow-up   • Shortness of Breath     HPI  Ms. Serrano is a pleasant 77 years old lady who came to cardiology clinic for follow-up visit.  She was accompanied by her daughter and .  She continues to complain of epigastric abdominal pain that typically comes if she skips meal and relieved with pantoprazole.  She sometimes takes extra aspirin thinking it would help with pain.  She denies any significant vomiting, tarry stools.  Also she reports persistent fatigue and exertional shortness of breath.  She gets occasional retrosternal chest discomfort that usually mild and she has never needed nitroglycerin.  Bilateral lower extremity swelling has not changed much since last visit.  She is compliant with her current medications and denies any significant side effects.  Blood pressure usually runs normal during home monitoring.    No ER visit or hospitalization since last clinic visit.      ROS  All systems reviewed and negative except as noted in HPI.    Past Medical History:   Diagnosis Date   • Allergic rhinitis    • Anemia    • Arthritis    • CAD (coronary artery disease) 2011    h/o CABG x3   • Cardiomyopathy (HCC)    • Degenerative joint disease of knee, left     sched replacement   • Edema    • GERD (gastroesophageal reflux disease)    • Heart disease    • Hiatal hernia    • Hyperlipidemia    • Hypertension    • Hypothyroidism    • Incontinence in female 8/10/2017   • JOVANNA (obstructive sleep apnea)     no machine, uses nasal strips   • Osteoporosis    • Prediabetes    • [facility]    • Urinary, incontinence, stress female        Past  "Surgical History:   Procedure Laterality Date   • BREAST BIOPSY Left    • CORONARY ARTERY BYPASS GRAFT  2011   • CORONARY ARTERY BYPASS GRAFT  2011    triple bypass @ [facility]   • HYSTERECTOMY     • KNEE SURGERY Left      cartilage removal   • [place] TOTAL KNEE ARTHROPLASTY Left 3/29/2016    Procedure: LEFT TOTAL KNEE ARTHROPLASTY placement of hemovac drain;  Surgeon: Tj Barraza MD;  Location: [facility];  Service: Orthopedics       Social History     Socioeconomic History   • Marital status:    Tobacco Use   • Smoking status: Never   • Smokeless tobacco: Never   Substance and Sexual Activity   • Alcohol use: No   • Drug use: No   • Sexual activity: Defer       Family History   Problem Relation Age of Onset   • Heart disease Mother 50   • Coronary artery disease Other    • Heart attack Father 50   • Arrhythmia Sister    • Breast cancer Neg Hx            ECG 12 Lead    Date/Time: 10/27/2022 12:53 PM  Performed by: Antony Galvin MD  Authorized by: Antony Galvin MD   Comparison: compared with previous ECG from 3/10/2022  Similar to previous ECG  Rhythm: sinus rhythm  Rate: normal  Conduction: conduction normal  ST Segments: ST segments normal  T Waves: T waves normal  QRS axis: normal  Other: no other findings    Clinical impression: normal ECG               Objective:     /88 (BP Location: Left arm, Patient Position: Sitting)   Pulse 66   Ht 165.1 cm (65\")   Wt 83.5 kg (184 lb)   BMI 30.62 kg/m²  Body mass index is 30.62 kg/m².     Constitutional:       General: Not in acute distress.     Appearance: Well-developed. Not diaphoretic.   Eyes:      Pupils: Pupils are equal, round, and reactive to light.   HENT:      Head: Normocephalic and atraumatic.   Neck:      Thyroid: No thyromegaly.   Pulmonary:      Effort: Pulmonary effort is normal. No respiratory distress.      Breath sounds: Normal breath sounds. No wheezing. No rales.   Chest:      Chest wall: Not tender to palpatation. "   Cardiovascular:      Normal rate. Regular rhythm.      No gallop.   Pulses:     Intact distal pulses.   Edema:     Peripheral edema present.     Pretibial: bilateral 1+ edema of the pretibial area.     Ankle: bilateral 1+ edema of the ankle.     Feet: bilateral edema of the feet.  Abdominal:      General: Bowel sounds are normal. There is no distension.      Palpations: Abdomen is soft.      Tenderness: There is no guarding.   Musculoskeletal: Normal range of motion.         General: No deformity.      Cervical back: Normal range of motion and neck supple. Skin:     General: Skin is warm and dry.      Findings: No rash.   Neurological:      Mental Status: Alert and oriented to person, place, and time.      Cranial Nerves: No cranial nerve deficit.      Deep Tendon Reflexes: Reflexes are normal and symmetric.   Psychiatric:         Judgment: Judgment normal.         Review Of Data: I have reviewed pertinent recent labs, images and documents and pertinent findings included in HPI or assessment below.    Lipid Panel    Lipid Panel 2/15/22 8/23/22   Total Cholesterol 207 (A) 182   Triglycerides 116 132   HDL Cholesterol 61 58   VLDL Cholesterol 21 23   LDL Cholesterol  125 (A) 101 (A)   LDL/HDL Ratio 2.0 1.7   (A) Abnormal value       Comments are available for some flowsheets but are not being displayed.               Assessment/Plan:         1. Hx of CAD s/p CABGX3 in 2011 with LIMA to LAD, SVG to OM and SVG to PDA.    Myocardial perfusion study on 3/24/2022 showed small inferolateral scar with mild cora-infarct ischemia  Gets only occasional chest discomfort but slightly worsened exertional shortness of breath  Start isosorbide mononitrate 30 mg p.o. daily.  Continue care with beta-blocker, high intensity statin and aspirin  Echocardiogram shows preserved left ventricular ejection fraction.  She could not tolerate Plavix that was started during prior visit.  2.  Hypercholesterolemia-on high intensity statin- lipid  panel near goal  3.  Hypertension-on Coreg and losartan/hydrochlorthiazide-controlled  4.  History of paroxysmal SVT in the perioop periord in 2011-remains in sinus rhythm  5.  Diagnosed with obstructive sleep apnea in the past but could not tolerate CPAP machine.  She uses nasal strips during sleep.  6.  Chronic degenerative joint disease-left knee replaced.  Currently complaining of limping  7.  Bilateral lower extremity pitting edema -advised to start wearing thigh-high compression stocking  8.  Epigastric abdominal pain-persistent, on PPI.  I will refer to GI    Encourage patient to walk more- complains of limping on the left  She will start monitor blood pressure twice daily as I have started her on Imdur.  GI referral  Check CBC and proBNP today    Return to clinic in 6 months or sooner with any concerning symptoms  Diagnosis and plan of care discussed with patient and verbalized understanding.            Your medication list          Accurate as of October 27, 2022  2:14 PM. If you have any questions, ask your nurse or doctor.            START taking these medications      Instructions Last Dose Given Next Dose Due   isosorbide mononitrate 30 MG 24 hr tablet  Commonly known as: IMDUR  Started by: Antony Galvin MD      Take 1 tablet by mouth Daily.          CONTINUE taking these medications      Instructions Last Dose Given Next Dose Due   acetaminophen 650 MG 8 hr tablet  Commonly known as: TYLENOL      Take 650 mg by mouth Every 8 (Eight) Hours As Needed.       aspirin 81 MG EC tablet      Take 1 tablet by mouth Daily.       atorvastatin 40 MG tablet  Commonly known as: LIPITOR      Take 0.5 tablets by mouth Daily.       carvedilol 6.25 MG tablet  Commonly known as: COREG      TAKE TWO TABLETS BY MOUTH TWICE A DAY WITH MEALS       CO ENZYME Q-10 PO      Take 100 mg by mouth 2 (two) times a day.       Echinacea Goldenseal Plus capsule      Take 900 mg by mouth every morning.       fexofenadine 60 MG  tablet  Commonly known as: ALLEGRA      Take 60 mg by mouth Daily.       Flaxseed Oil 1200 MG capsule      Take 2 capsules by mouth daily.       Ginkgo Biloba Extract 60 MG capsule      Take 1 tablet by mouth every morning.       Glucosamine Chondr 1500 Complx capsule      Take 2 capsules by mouth every morning.       levothyroxine 50 MCG tablet  Commonly known as: SYNTHROID, LEVOTHROID      TAKE ONE TABLET BY MOUTH EVERY MORNING       losartan-hydrochlorothiazide 100-25 MG per tablet  Commonly known as: HYZAAR      TAKE ONE TABLET BY MOUTH DAILY       MULTIVITAMINS PO      Take 1 tablet by mouth daily.       pantoprazole 40 MG EC tablet  Commonly known as: Protonix      Take 1 tablet by mouth Daily.       Potassium Gluconate 595 MG capsule      Take 595 mcg/day by mouth every morning.       VITAMIN B COMPLEX IJ      Take 1 tablet by mouth 2 (two) times a day.       Vitamin D-3 25 MCG (1000 UT) capsule      Take 1,000 Units by mouth every night.             Where to Get Your Medications      These medications were sent to [facility] [id number] [place] - [address] - [phone] [facility] [phone]   [address] [facility] [address]    Phone: [phone]   · isosorbide mononitrate 30 MG 24 hr tablet             Antony Galvin MD  10/27/22  14:14 EDT

## 2022-11-10 ENCOUNTER — OFFICE VISIT (OUTPATIENT)
Dept: GASTROENTEROLOGY | Facility: CLINIC | Age: 77
End: 2022-11-10

## 2022-11-10 VITALS
HEIGHT: 65 IN | SYSTOLIC BLOOD PRESSURE: 104 MMHG | BODY MASS INDEX: 31.02 KG/M2 | WEIGHT: 186.2 LBS | DIASTOLIC BLOOD PRESSURE: 68 MMHG

## 2022-11-10 DIAGNOSIS — Z12.11 ENCOUNTER FOR SCREENING FOR MALIGNANT NEOPLASM OF COLON: ICD-10-CM

## 2022-11-10 DIAGNOSIS — R10.13 EPIGASTRIC PAIN: Primary | ICD-10-CM

## 2022-11-10 PROCEDURE — 99204 OFFICE O/P NEW MOD 45 MIN: CPT | Performed by: INTERNAL MEDICINE

## 2022-11-10 RX ORDER — PANTOPRAZOLE SODIUM 20 MG/1
TABLET, DELAYED RELEASE ORAL
COMMUNITY
Start: 2022-09-28 | End: 2022-11-10

## 2022-11-10 NOTE — PROGRESS NOTES
PATIENT INFORMATION  Carla Serrano       - 1945    CHIEF COMPLAINT  Chief Complaint   Patient presents with   • Abdominal Pain     Bloated; reflux; abdominal pain       HISTORY OF PRESENT ILLNESS  Here for abdominal pain. Epigastric burning and worse with her Plavix due to muscle aches and pains and increased abd burning with onions and spicy foods    When asked about dysphagia she recalls having been put on a medicine that she stopped after her symptoms resolved.    Good discusion wrt endoscopy and her very unfortunate experience with being awake in the past there fore avoiding any further scopes up until now      REVIEWED PERTINENT RESULTS/ LABS  No results found for: CASEREPORT, FINALDX  Lab Results   Component Value Date    HGB 13.0 10/27/2022    MCV 98.4 (H) 10/27/2022     10/27/2022    ALT 21 2022    AST 17 2022    HGBA1C 5.20 2021    INR 0.97 03/15/2016    TRIG 132 2022      No results found.    REVIEW OF SYSTEMS  Review of Systems   Constitutional: Negative.    HENT: Negative.    Eyes:        Recent cataract surgery   Respiratory: Positive for shortness of breath.    Cardiovascular: Negative.    Gastrointestinal: Positive for abdominal distention and abdominal pain.        Reflux    Endocrine: Positive for cold intolerance.   Genitourinary: Negative.    Musculoskeletal: Positive for arthralgias and back pain.   Skin: Negative.    Allergic/Immunologic: Positive for environmental allergies.   Neurological: Negative.    Hematological: Negative.    Psychiatric/Behavioral: Negative.          ACTIVE PROBLEMS  Patient Active Problem List    Diagnosis    • Epigastric pain [R10.13]    • Depression [F32.A]    • Vomiting [R11.10]    • High risk medication use [Z79.899]    • Reflux esophagitis [K21.00]    • Encounter for screening for malignant neoplasm of colon [Z12.11]    • Incontinence in female [R32]    • Hypothyroidism [E03.9]    • Prediabetes [R73.03]    • SVT  (supraventricular tachycardia) (HCC) [I47.1]    • Hypertension [I10]    • JOVANNA (obstructive sleep apnea) [G47.33]    • Hyperlipidemia [E78.5]    • DJD (degenerative joint disease) of knee [M17.9]    • Edema [R60.9]    • Coronary artery disease of bypass graft of native heart with stable angina pectoris (HCC) [I25.708]          PAST MEDICAL HISTORY  Past Medical History:   Diagnosis Date   • Allergic rhinitis    • Anemia    • Arthritis    • CAD (coronary artery disease) 2011    h/o CABG x3   • Cardiomyopathy (HCC)    • Degenerative joint disease of knee, left     sched replacement   • Edema    • GERD (gastroesophageal reflux disease)    • Heart disease    • Hiatal hernia    • Hyperlipidemia    • Hypertension    • Hypothyroidism    • Incontinence in female 8/10/2017   • JOVANNA (obstructive sleep apnea)     no machine, uses nasal strips   • Osteoporosis    • Prediabetes    • SV[facility]supra[facility]ricular tachycardia) ([facility])    • Urinary, incontinence, stress female          SURGICAL HISTORY  Past Surgical History:   Procedure Laterality Date   • BREAST BIOPSY Left    • CORONARY ARTERY BYPASS GRAFT  2011   • CORONARY ARTERY BYPASS GRAFT  2011    triple bypass @ [facility]   • HYSTERECTOMY     • KNEE SURGERY Left      cartilage removal   • NH TOTAL KNEE ARTHROPLASTY Left 3/29/2016    Procedure: LEFT TOTAL KNEE ARTHROPLASTY placement of hemovac drain;  Surgeon: Tj Barraza MD;  Location: [facility];  Service: Orthopedics         FAMILY HISTORY  Family History   Problem Relation Age of Onset   • Heart disease Mother 50   • Coronary artery disease Other    • Heart attack Father 50   • Arrhythmia Sister    • Breast cancer Neg Hx          SOCIAL HISTORY  Social History     Occupational History   • Not on file   Tobacco Use   • Smoking status: Never   • Smokeless tobacco: Never   Substance and Sexual Activity   • Alcohol use: No   • Drug use: No   • Sexual activity: Defer         CURRENT MEDICATIONS    Current Outpatient  Medications:   •  acetaminophen (TYLENOL) 650 MG 8 hr tablet, Take 650 mg by mouth Every 8 (Eight) Hours As Needed., Disp: , Rfl:   •  aspirin (aspirin) 81 MG EC tablet, Take 1 tablet by mouth Daily., Disp: 90 tablet, Rfl: 3  •  atorvastatin (LIPITOR) 40 MG tablet, Take 0.5 tablets by mouth Daily., Disp: 90 tablet, Rfl: 3  •  B Complex Vitamins (VITAMIN B COMPLEX IJ), Take 1 tablet by mouth 2 (two) times a day., Disp: , Rfl:   •  carvedilol (COREG) 6.25 MG tablet, TAKE TWO TABLETS BY MOUTH TWICE A DAY WITH MEALS, Disp: 180 tablet, Rfl: 3  •  Cholecalciferol (VITAMIN D-3) 1000 UNITS capsule, Take 1,000 Units by mouth every night., Disp: , Rfl:   •  CO ENZYME Q-10 PO, Take 100 mg by mouth 2 (two) times a day., Disp: , Rfl:   •  fexofenadine (ALLEGRA) 60 MG tablet, Take 60 mg by mouth Daily., Disp: , Rfl:   •  Flaxseed, Linseed, (FLAXSEED OIL) 1200 MG capsule, Take 2 capsules by mouth daily., Disp: , Rfl:   •  Ginkgo Biloba Extract 60 MG capsule, Take 1 tablet by mouth every morning., Disp: , Rfl:   •  Glucosamine-Chondroit-Vit C-Mn (GLUCOSAMINE CHONDR 1500 COMPLX) capsule, Take 2 capsules by mouth every morning., Disp: , Rfl:   •  isosorbide mononitrate (IMDUR) 30 MG 24 hr tablet, Take 1 tablet by mouth Daily., Disp: 90 tablet, Rfl: 3  •  levothyroxine (SYNTHROID, LEVOTHROID) 50 MCG tablet, TAKE ONE TABLET BY MOUTH EVERY MORNING, Disp: 90 tablet, Rfl: 1  •  losartan-hydrochlorothiazide (HYZAAR) 100-25 MG per tablet, TAKE ONE TABLET BY MOUTH DAILY, Disp: 90 tablet, Rfl: 2  •  Misc Natural Products (ECHINACEA GOLDENSEAL PLUS) capsule, Take 900 mg by mouth every morning., Disp: , Rfl:   •  Multiple Vitamin (MULTIVITAMINS PO), Take 1 tablet by mouth daily., Disp: , Rfl:   •  pantoprazole (Protonix) 40 MG EC tablet, Take 1 tablet by mouth Daily., Disp: 90 tablet, Rfl: 3  •  Potassium Gluconate 595 MG capsule, Take 595 mcg/day by mouth every morning., Disp: , Rfl:     ALLERGIES  Latex    VITALS  Vitals:    11/10/22 0833  "  BP: 104/68   BP Location: Left arm   Patient Position: Sitting   Cuff Size: Adult   Weight: 84.5 kg (186 lb 3.2 oz)   Height: 165.1 cm (65\")       PHYSICAL EXAM  Debilities/Disabilities Identified: None  Emotional Behavior: Appropriate  Wt Readings from Last 3 Encounters:   11/10/22 84.5 kg (186 lb 3.2 oz)   10/27/22 83.5 kg (184 lb)   08/29/22 84.7 kg (186 lb 12.8 oz)     Ht Readings from Last 1 Encounters:   11/10/22 165.1 cm (65\")     Body mass index is 30.99 kg/m².  Physical Exam  Constitutional:       Appearance: She is well-developed. She is not diaphoretic.   HENT:      Head: Normocephalic and atraumatic.   Eyes:      General: No scleral icterus.     Conjunctiva/sclera: Conjunctivae normal.      Pupils: Pupils are equal, round, and reactive to light.   Neck:      Thyroid: No thyromegaly.   Cardiovascular:      Rate and Rhythm: Normal rate and regular rhythm.      Heart sounds: Normal heart sounds. No murmur heard.    No gallop.   Pulmonary:      Effort: Pulmonary effort is normal.      Breath sounds: Normal breath sounds. No wheezing or rales.   Abdominal:      General: Bowel sounds are normal. There is no distension or abdominal bruit.      Palpations: Abdomen is soft. There is no shifting dullness, fluid wave or mass.      Tenderness: There is abdominal tenderness in the epigastric area. There is no guarding. Negative signs include Patel's sign.      Hernia: There is no hernia in the ventral area.   Musculoskeletal:         General: Normal range of motion.      Cervical back: Normal range of motion and neck supple.   Lymphadenopathy:      Cervical: No cervical adenopathy.   Skin:     General: Skin is warm and dry.      Findings: No erythema or rash.   Neurological:      Mental Status: She is alert and oriented to person, place, and time.   Psychiatric:         Mood and Affect: Mood normal.         Behavior: Behavior normal.         CLINICAL DATA REVIEWED   reviewed previous lab results and integrated " with today's visit, reviewed notes from other physicians and/or last GI encounter, reviewed previous endoscopy results and available photos, reviewed surgical pathology results from previous biopsies    ASSESSMENT  Diagnoses and all orders for this visit:    Epigastric pain  -     Case Request; Standing  -     Case Request    Encounter for screening for malignant neoplasm of colon  -     Case Request; Standing  -     Case Request    Other orders  -     Discontinue: pantoprazole (PROTONIX) 20 MG EC tablet  -     Follow Anesthesia Guidelines / Protocol; Future  -     Obtain Informed Consent; Standing          PLAN  Return if symptoms worsen or fail to improve.    I have discussed the above plan with the patient.  They verbalize understanding and are in agreement with the plan.  They have been advised to contact the office for any questions, concerns, or changes related to their health.

## 2022-11-11 ENCOUNTER — PATIENT ROUNDING (BHMG ONLY) (OUTPATIENT)
Dept: GASTROENTEROLOGY | Facility: CLINIC | Age: 77
End: 2022-11-11

## 2022-11-11 NOTE — PROGRESS NOTES
November 11, 2022    Hello, may I speak with Carla Serrano?    My name is Jaja Sherwood      I am  with [facility]  [address]  [place] [address] [phone].    Before we get started may I verify your date of birth? 1945    I am calling to officially welcome you to our practice and ask about your recent visit. Is this a good time to talk? yes    Tell me about your visit with us. What things went well?  Everything was good       We're always looking for ways to make our patients' experiences even better. Do you have recommendations on ways we may improve?  no    Overall were you satisfied with your first visit to our practice? yes       I appreciate you taking the time to speak with me today. Is there anything else I can do for you? no      Thank you, and have a great day.

## 2023-02-01 ENCOUNTER — TELEPHONE (OUTPATIENT)
Dept: INTERNAL MEDICINE | Facility: CLINIC | Age: 78
End: 2023-02-01
Payer: MEDICARE

## 2023-02-01 DIAGNOSIS — R79.9 ABNORMAL FINDING OF BLOOD CHEMISTRY, UNSPECIFIED: ICD-10-CM

## 2023-02-01 DIAGNOSIS — E03.9 ACQUIRED HYPOTHYROIDISM: Primary | ICD-10-CM

## 2023-02-02 ENCOUNTER — LAB (OUTPATIENT)
Dept: LAB | Facility: HOSPITAL | Age: 78
End: 2023-02-02
Payer: MEDICARE

## 2023-02-02 ENCOUNTER — OFFICE VISIT (OUTPATIENT)
Dept: CARDIOLOGY | Facility: CLINIC | Age: 78
End: 2023-02-02
Payer: MEDICARE

## 2023-02-02 VITALS
SYSTOLIC BLOOD PRESSURE: 120 MMHG | DIASTOLIC BLOOD PRESSURE: 70 MMHG | OXYGEN SATURATION: 95 % | HEART RATE: 63 BPM | RESPIRATION RATE: 16 BRPM | HEIGHT: 63 IN | WEIGHT: 187 LBS | BODY MASS INDEX: 33.13 KG/M2

## 2023-02-02 DIAGNOSIS — R06.02 EXERTIONAL SHORTNESS OF BREATH: Primary | ICD-10-CM

## 2023-02-02 DIAGNOSIS — E78.2 MIXED HYPERLIPIDEMIA: ICD-10-CM

## 2023-02-02 DIAGNOSIS — R06.02 EXERTIONAL SHORTNESS OF BREATH: ICD-10-CM

## 2023-02-02 LAB
ALBUMIN SERPL-MCNC: 4.1 G/DL (ref 3.5–5.2)
ALBUMIN/GLOB SERPL: 1.6 G/DL
ALP SERPL-CCNC: 56 U/L (ref 39–117)
ALT SERPL W P-5'-P-CCNC: 18 U/L (ref 1–33)
ANION GAP SERPL CALCULATED.3IONS-SCNC: 11.1 MMOL/L (ref 5–15)
AST SERPL-CCNC: 19 U/L (ref 1–32)
BASOPHILS # BLD AUTO: 0.02 10*3/MM3 (ref 0–0.2)
BASOPHILS NFR BLD AUTO: 0.5 % (ref 0–1.5)
BILIRUB SERPL-MCNC: 0.4 MG/DL (ref 0–1.2)
BUN SERPL-MCNC: 20 MG/DL (ref 8–23)
BUN/CREAT SERPL: 25 (ref 7–25)
CALCIUM SPEC-SCNC: 9.1 MG/DL (ref 8.6–10.5)
CHLORIDE SERPL-SCNC: 105 MMOL/L (ref 98–107)
CHOLEST SERPL-MCNC: 185 MG/DL (ref 0–200)
CO2 SERPL-SCNC: 25.9 MMOL/L (ref 22–29)
CREAT SERPL-MCNC: 0.8 MG/DL (ref 0.57–1)
D DIMER PPP FEU-MCNC: 0.43 MCGFEU/ML (ref 0–0.76)
DEPRECATED RDW RBC AUTO: 41.2 FL (ref 37–54)
EGFRCR SERPLBLD CKD-EPI 2021: 76.5 ML/MIN/1.73
EOSINOPHIL # BLD AUTO: 0.08 10*3/MM3 (ref 0–0.4)
EOSINOPHIL NFR BLD AUTO: 1.9 % (ref 0.3–6.2)
ERYTHROCYTE [DISTWIDTH] IN BLOOD BY AUTOMATED COUNT: 11.7 % (ref 12.3–15.4)
GLOBULIN UR ELPH-MCNC: 2.6 GM/DL
GLUCOSE SERPL-MCNC: 85 MG/DL (ref 65–99)
HCT VFR BLD AUTO: 37.5 % (ref 34–46.6)
HDLC SERPL-MCNC: 68 MG/DL (ref 40–60)
HGB BLD-MCNC: 12.9 G/DL (ref 12–15.9)
IMM GRANULOCYTES # BLD AUTO: 0.02 10*3/MM3 (ref 0–0.05)
IMM GRANULOCYTES NFR BLD AUTO: 0.5 % (ref 0–0.5)
LDLC SERPL CALC-MCNC: 98 MG/DL (ref 0–100)
LDLC/HDLC SERPL: 1.41 {RATIO}
LYMPHOCYTES # BLD AUTO: 1.39 10*3/MM3 (ref 0.7–3.1)
LYMPHOCYTES NFR BLD AUTO: 33.5 % (ref 19.6–45.3)
MCH RBC QN AUTO: 33.2 PG (ref 26.6–33)
MCHC RBC AUTO-ENTMCNC: 34.4 G/DL (ref 31.5–35.7)
MCV RBC AUTO: 96.6 FL (ref 79–97)
MONOCYTES # BLD AUTO: 0.46 10*3/MM3 (ref 0.1–0.9)
MONOCYTES NFR BLD AUTO: 11.1 % (ref 5–12)
NEUTROPHILS NFR BLD AUTO: 2.18 10*3/MM3 (ref 1.7–7)
NEUTROPHILS NFR BLD AUTO: 52.5 % (ref 42.7–76)
NRBC BLD AUTO-RTO: 0 /100 WBC (ref 0–0.2)
NT-PROBNP SERPL-MCNC: 153 PG/ML (ref 0–1800)
PLATELET # BLD AUTO: 214 10*3/MM3 (ref 140–450)
PMV BLD AUTO: 9.9 FL (ref 6–12)
POTASSIUM SERPL-SCNC: 3.9 MMOL/L (ref 3.5–5.2)
PROT SERPL-MCNC: 6.7 G/DL (ref 6–8.5)
RBC # BLD AUTO: 3.88 10*6/MM3 (ref 3.77–5.28)
SODIUM SERPL-SCNC: 142 MMOL/L (ref 136–145)
TRIGL SERPL-MCNC: 107 MG/DL (ref 0–150)
VLDLC SERPL-MCNC: 19 MG/DL (ref 5–40)
WBC NRBC COR # BLD: 4.15 10*3/MM3 (ref 3.4–10.8)

## 2023-02-02 PROCEDURE — 36415 COLL VENOUS BLD VENIPUNCTURE: CPT

## 2023-02-02 PROCEDURE — 93000 ELECTROCARDIOGRAM COMPLETE: CPT | Performed by: INTERNAL MEDICINE

## 2023-02-02 PROCEDURE — 85025 COMPLETE CBC W/AUTO DIFF WBC: CPT

## 2023-02-02 PROCEDURE — 99214 OFFICE O/P EST MOD 30 MIN: CPT | Performed by: INTERNAL MEDICINE

## 2023-02-02 PROCEDURE — 85379 FIBRIN DEGRADATION QUANT: CPT

## 2023-02-02 PROCEDURE — 80053 COMPREHEN METABOLIC PANEL: CPT

## 2023-02-02 PROCEDURE — 83880 ASSAY OF NATRIURETIC PEPTIDE: CPT

## 2023-02-02 PROCEDURE — 80061 LIPID PANEL: CPT

## 2023-02-02 NOTE — PROGRESS NOTES
PATIENTINFORMATION    Date of Office Visit: 2023  Encounter Provider: Antony Galvin MD  Place of Service: [facility]  Patient Name: Carla Serrano  : 1945    Subjective:     Encounter Date:2023      Patient ID: Carla Serrano is a 77 y.o. female.    No chief complaint on file.    HPI  Ms. Serrano is a pleasant 77-year-old lady who came to cardiology clinic accompanied by her father and daughter.  She complains of worsening exertional shortness of breath of about 1 months duration and gets easily short winded with minimal exertion's.  She denies any chest discomfort, orthopnea, PND, palpitations, presyncope or syncope or extremity swelling.  She is very compliant with current medical regimen without any significant side effects.  She denies any cough, fever, wheezing  She requested a earlier appointment because of breathing issues.      ROS  All systems reviewed and negative except as noted in HPI.    Past Medical History:   Diagnosis Date   • Allergic rhinitis    • Anemia    • Arthritis    • CAD (coronary artery disease) 2011    h/o CABG x3   • Cardiomyopathy (HCC)    • Degenerative joint disease of knee, left     sched replacement   • Edema    • GERD (gastroesophageal reflux disease)    • Heart disease    • Hiatal hernia    • Hyperlipidemia    • Hypertension    • Hypothyroidism    • Incontinence in female 8/10/2017   • JOVANNA (obstructive sleep apnea)     no machine, uses nasal strips   • Osteoporosis    • Prediabetes    • SVT (supraventricular tachycardia) ([facility])    • Urinary, incontinence, stress female        Past Surgical History:   Procedure Laterality Date   • BREAST BIOPSY Left    • CORONARY ARTERY BYPASS GRAFT     • CORONARY ARTERY BYPASS GRAFT      triple bypass @ [facility]   • HYSTERECTOMY     • KNEE SURGERY Left      cartilage removal   • MD ARTHRP KNE CONDYLE&PLATU MEDIAL&LAT COMPARTMENTS Left 3/29/2016    Procedure: LEFT TOTAL KNEE  "ARTHROPLASTY placement of hemovac drain;  Surgeon: Tj Barraza MD;  Location: [facility];  Service: Orthopedics       Social History     Socioeconomic History   • Marital status:    Tobacco Use   • Smoking status: Never   • Smokeless tobacco: Never   Substance and Sexual Activity   • Alcohol use: No   • Drug use: No   • Sexual activity: Defer       Family History   Problem Relation Age of Onset   • Heart disease Mother 50   • Coronary artery disease Other    • Heart attack Father 50   • Arrhythmia Sister    • Breast cancer Neg Hx            ECG 12 Lead    Date/Time: 2/2/2023 9:11 AM  Performed by: Antony Galvin MD  Authorized by: Antony Galvin MD   Comparison: compared with previous ECG from 10/27/2022  Similar to previous ECG  Comparison to previous ECG: PVC new on this tracing   Rhythm: sinus rhythm  Rate: normal  Conduction: conduction normal  ST Segments: ST segments normal  T Waves: T waves normal  QRS axis: normal  Other: no other findings    Clinical impression: abnormal EKG               Objective:     /70   Pulse 63   Resp 16   Ht 160 cm (63\")   Wt 84.8 kg (187 lb)   SpO2 95%   BMI 33.13 kg/m²  Body mass index is 33.13 kg/m².     Constitutional:       General: Not in acute distress.     Appearance: Well-developed. Not diaphoretic.   Eyes:      Pupils: Pupils are equal, round, and reactive to light.   HENT:      Head: Normocephalic and atraumatic.   Neck:      Thyroid: No thyromegaly.   Pulmonary:      Effort: Pulmonary effort is normal. No respiratory distress.      Breath sounds: Normal breath sounds. No wheezing. No rales.   Chest:      Chest wall: Not tender to palpatation.   Cardiovascular:      Normal rate. Regular rhythm.      No gallop.   Pulses:     Intact distal pulses.   Edema:     Peripheral edema absent.   Abdominal:      General: Bowel sounds are normal. There is no distension.      Palpations: Abdomen is soft.      Tenderness: There is no guarding. "   Musculoskeletal: Normal range of motion.         General: No deformity.      Cervical back: Normal range of motion and neck supple. Skin:     General: Skin is warm and dry.      Findings: No rash.   Neurological:      Mental Status: Alert and oriented to person, place, and time.      Cranial Nerves: No cranial nerve deficit.      Deep Tendon Reflexes: Reflexes are normal and symmetric.   Psychiatric:         Judgment: Judgment normal.         Review Of Data: I have reviewed pertinent recent labs, images and documents and pertinent findings included in HPI or assessment below.    Lipid Panel    Lipid Panel 2/15/22 8/23/22   Total Cholesterol 207 (A) 182   Triglycerides 116 132   HDL Cholesterol 61 58   VLDL Cholesterol 21 23   LDL Cholesterol  125 (A) 101 (A)   LDL/HDL Ratio 2.0 1.7   (A) Abnormal value       Comments are available for some flowsheets but are not being displayed.               Assessment/Plan:         1. Hx of CAD s/p CABGX3 in 2011 with LIMA to LAD, SVG to OM and SVG to PDA.    Myocardial perfusion study on 3/24/2022 showed small inferolateral scar with mild cora-infarct ischemia  Prior echocardiogram shows preserved left ventricular ejection fraction.  EKG today - Sinus rhythm with PVC- no significant changes from prior EKG  She could not tolerate Plavix that was started during prior visit.  2.  Hypercholesterolemia-on high intensity statin- check lipid panel  3.  Hypertension-on Coreg and losartan/hydrochlorthiazide-controlled  4.  History of paroxysmal SVT in the perioop periord in 2011-remains in sinus rhythm  5.  Diagnosed with obstructive sleep apnea in the past but could not tolerate CPAP machine.  She uses nasal strips during sleep.  6.  Chronic degenerative joint disease-left knee replaced.    7.  Bilateral lower extremity pitting edema -about the same    Ms. Serrano complaining of worsening shortness of breath of 1 month without other evidence for CHF.  She gets short of breath on  minimal exertion.  Bilateral leg and ankle edema is chronic.  She denies chest discomfort.  I will check proBNP, D-dimer, CBC.  May do echocardiogram/repeat  stress test with persistent symptoms       Diagnosis and plan of care discussed with patient and verbalized understanding.            Your medication list          Accurate as of February 2, 2023  3:15 PM. If you have any questions, ask your nurse or doctor.            CONTINUE taking these medications      Instructions Last Dose Given Next Dose Due   acetaminophen 650 MG 8 hr tablet  Commonly known as: TYLENOL      Take 650 mg by mouth Every 8 (Eight) Hours As Needed.       aspirin 81 MG EC tablet      Take 1 tablet by mouth Daily.       atorvastatin 40 MG tablet  Commonly known as: LIPITOR      Take 0.5 tablets by mouth Daily.       carvedilol 6.25 MG tablet  Commonly known as: COREG      TAKE TWO TABLETS BY MOUTH TWICE A DAY WITH MEALS       CO ENZYME Q-10 PO      Take 100 mg by mouth 2 (two) times a day.       Echinacea Goldenseal Plus capsule      Take 900 mg by mouth every morning.       fexofenadine 60 MG tablet  Commonly known as: ALLEGRA      Take 60 mg by mouth Daily.       Flaxseed Oil 1200 MG capsule      Take 2 capsules by mouth daily.       Ginkgo Biloba Extract 60 MG capsule      Take 1 tablet by mouth every morning.       Glucosamine Chondr 1500 Complx capsule      Take 2 capsules by mouth every morning.       isosorbide mononitrate 30 MG 24 hr tablet  Commonly known as: IMDUR      Take 1 tablet by mouth Daily.       levothyroxine 50 MCG tablet  Commonly known as: SYNTHROID, LEVOTHROID      TAKE ONE TABLET BY MOUTH EVERY MORNING       losartan-hydrochlorothiazide 100-25 MG per tablet  Commonly known as: HYZAAR      TAKE ONE TABLET BY MOUTH DAILY       MULTIVITAMINS PO      Take 1 tablet by mouth daily.       pantoprazole 40 MG EC tablet  Commonly known as: Protonix      Take 1 tablet by mouth Daily.       Potassium Gluconate 595 MG capsule       Take 595 mcg/day by mouth every morning.       VITAMIN B COMPLEX IJ      Take 1 tablet by mouth 2 (two) times a day.       Vitamin D-3 25 MCG (1000 UT) capsule      Take 1,000 Units by mouth every night.                  Antony Galvin MD  02/02/23  15:15 EST

## 2023-02-03 ENCOUNTER — TELEPHONE (OUTPATIENT)
Dept: CARDIOLOGY | Facility: CLINIC | Age: 78
End: 2023-02-03
Payer: MEDICARE

## 2023-02-03 DIAGNOSIS — R06.02 EXERTIONAL SHORTNESS OF BREATH: Primary | ICD-10-CM

## 2023-02-03 NOTE — TELEPHONE ENCOUNTER
I was able to add orders to blood from yesterday.  Pt doesn't need to come in for another lab appt, just her OV.

## 2023-02-03 NOTE — TELEPHONE ENCOUNTER
----- Message from Jinny Freeman MA sent at 2/3/2023  2:56 PM EST -----    ----- Message -----  From: Antony Galvin MD  Sent: 2/3/2023   2:53 PM EST  To: Jinny Freeman MA, #    Please notify Mrs Serrano that Blood work from yesterday is normal. I will get another stress test and echo as there is no obvious cause for the exertional so.   Will copy Catrina for scheduling.  Thanks

## 2023-02-03 NOTE — TELEPHONE ENCOUNTER
I spoke with Carla Serrano and updated pt on results/recommendations from provider.  Pt verbalized understanding and has no further questions at this time.    Scheduling,  Can you please contact pt to have the below testing scheduled?    Thank you,    Aura Rasmussen RN  Triage [facility]  02/03/23 14:59 EST

## 2023-02-07 DIAGNOSIS — E03.9 ACQUIRED HYPOTHYROIDISM: ICD-10-CM

## 2023-02-07 RX ORDER — LEVOTHYROXINE SODIUM 0.05 MG/1
TABLET ORAL
Qty: 90 TABLET | Refills: 1 | Status: SHIPPED | OUTPATIENT
Start: 2023-02-07

## 2023-02-28 ENCOUNTER — OFFICE VISIT (OUTPATIENT)
Dept: INTERNAL MEDICINE | Facility: CLINIC | Age: 78
End: 2023-02-28
Payer: MEDICARE

## 2023-02-28 VITALS
WEIGHT: 186.6 LBS | DIASTOLIC BLOOD PRESSURE: 78 MMHG | BODY MASS INDEX: 33.06 KG/M2 | TEMPERATURE: 97.8 F | HEART RATE: 65 BPM | HEIGHT: 63 IN | SYSTOLIC BLOOD PRESSURE: 122 MMHG | OXYGEN SATURATION: 98 %

## 2023-02-28 DIAGNOSIS — Z12.31 ENCOUNTER FOR SCREENING MAMMOGRAM FOR MALIGNANT NEOPLASM OF BREAST: ICD-10-CM

## 2023-02-28 DIAGNOSIS — N39.46 MIXED STRESS AND URGE URINARY INCONTINENCE: ICD-10-CM

## 2023-02-28 DIAGNOSIS — E78.2 MIXED HYPERLIPIDEMIA: ICD-10-CM

## 2023-02-28 DIAGNOSIS — E03.9 ACQUIRED HYPOTHYROIDISM: ICD-10-CM

## 2023-02-28 DIAGNOSIS — K21.9 GASTROESOPHAGEAL REFLUX DISEASE, UNSPECIFIED WHETHER ESOPHAGITIS PRESENT: ICD-10-CM

## 2023-02-28 DIAGNOSIS — Z78.0 POST-MENOPAUSAL: ICD-10-CM

## 2023-02-28 DIAGNOSIS — Z00.00 MEDICARE ANNUAL WELLNESS VISIT, SUBSEQUENT: Primary | ICD-10-CM

## 2023-02-28 DIAGNOSIS — I10 ESSENTIAL HYPERTENSION: ICD-10-CM

## 2023-02-28 DIAGNOSIS — I25.10 CORONARY ARTERY DISEASE INVOLVING NATIVE CORONARY ARTERY OF NATIVE HEART WITHOUT ANGINA PECTORIS: ICD-10-CM

## 2023-02-28 PROCEDURE — 1170F FXNL STATUS ASSESSED: CPT | Performed by: INTERNAL MEDICINE

## 2023-02-28 PROCEDURE — 1159F MED LIST DOCD IN RCRD: CPT | Performed by: INTERNAL MEDICINE

## 2023-02-28 PROCEDURE — G0439 PPPS, SUBSEQ VISIT: HCPCS | Performed by: INTERNAL MEDICINE

## 2023-02-28 NOTE — PATIENT INSTRUCTIONS
Medicare Wellness  Personal Prevention Plan of Service     Date of Office Visit:    Encounter Provider:  Hilda Loera MD  Place of Service:  [facility] PRIMARY [facility]ARE  Patient Name: Carla Serrano  :  1945    As part of the Medicare Wellness portion of your visit today, we are providing you with this personalized preventive plan of services (PPPS). This plan is based upon recommendations of the [place] Preventive Services Task Force (USPSTF) and the Advisory Committee on Immunization Practices (ACIP).    This lists the preventive care services that should be considered, and provides dates of when you are due. Items listed as completed are up-to-date and do not require any further intervention.    Health Maintenance   Topic Date Due    ZOSTER VACCINE (1 of 2) Never done    MAMMOGRAM  2022    DXA SCAN  2023    LIPID PANEL  2024    ANNUAL WELLNESS VISIT  2024    COLORECTAL CANCER SCREENING  2025    TDAP/TD VACCINES (2 - Td or Tdap) 2026    HEPATITIS C SCREENING  Completed    COVID-19 Vaccine  Completed    INFLUENZA VACCINE  Completed    Pneumococcal Vaccine 65+  Completed       Orders Placed This Encounter   Procedures    Mammo Screening Digital Tomosynthesis Bilateral With CAD     Standing Status:   Future     Standing Expiration Date:   2024     Order Specific Question:   Reason for Exam:     Answer:   screening     Order Specific Question:   Release to patient     Answer:   Routine Release    DEXA Bone Density Axial     Standing Status:   Future     Standing Expiration Date:   2024     Order Specific Question:   Reason for Exam:     Answer:   screening    Comprehensive Metabolic Panel     Standing Status:   Future     Standing Expiration Date:   2024     Order Specific Question:   Release to patient     Answer:   Immediate    Lipid Panel With LDL / HDL Ratio     Standing Status:   Future     Standing Expiration Date:   2024     TSH     Standing Status:   Future     Standing Expiration Date:   2/28/2024    T4, Free     Standing Status:   Future     Standing Expiration Date:   2/28/2024    CBC & Differential     Standing Status:   Future     Standing Expiration Date:   2/28/2024     Order Specific Question:   Manual Differential     Answer:   No       Return in about 6 months (around 8/28/2023) for Recheck, labs.

## 2023-02-28 NOTE — PROGRESS NOTES
Carla Serrano is a 77 y.o. female, who presents with a chief complaint of   Chief Complaint   Patient presents with   • Follow-up     Lab review           HPI   Pt here for f/u    Since dorinda she has had more BROUSSARD, soa,and fatigue.  She went to see cardiology and has a stress test/echo scheduled for tomorrow. She had a cabd x 2 in 2011.  She is on imdur, coreg, losartan, hctz, asa, and atorvastatin.   She was unable to tolerate plavix bc of epigastric burning.  She follows with GI and has egd ordered. + LE edema.  She is not wearing compression socks. She tries to limit salt in her diet.    Mixed stress and urge incontinence - she wears pads daily.  She has not been on meds in the past.      Hypothyroidism - no hair/skin changes.     HLD - on statin.  No cramps or myalgias.    The following portions of the patient's history were reviewed and updated as appropriate: allergies, current medications, past family history, past medical history, past social history, past surgical history and problem list.    Allergies: Latex    Review of Systems   Constitutional: Negative.    HENT: Negative.    Eyes: Negative.    Respiratory: Negative.    Cardiovascular: Negative.    Gastrointestinal: Negative.    Endocrine: Negative.    Genitourinary: Positive for urgency.   Musculoskeletal: Negative.    Skin: Negative.    Allergic/Immunologic: Negative.    Neurological: Negative.    Hematological: Negative.    Psychiatric/Behavioral: Negative.    All other systems reviewed and are negative.            Wt Readings from Last 3 Encounters:   02/28/23 84.6 kg (186 lb 9.6 oz)   02/02/23 84.8 kg (187 lb)   11/10/22 84.5 kg (186 lb 3.2 oz)     Temp Readings from Last 3 Encounters:   02/28/23 97.8 °F (36.6 °C)   08/29/22 97.5 °F (36.4 °C)   02/22/22 97.8 °F (36.6 °C) (Temporal)     BP Readings from Last 3 Encounters:   02/28/23 122/78   02/02/23 120/70   11/10/22 104/68     Pulse Readings from Last 3 Encounters:   02/28/23 65    02/02/23 63   10/27/22 66     Body mass index is 33.06 kg/m².  SpO2 Readings from Last 3 Encounters:   02/28/23 98%   02/02/23 95%   08/29/22 94%          Physical Exam  Vitals and nursing note reviewed.   Constitutional:       General: She is not in acute distress.     Appearance: She is well-developed.   HENT:      Head: Normocephalic and atraumatic.      Right Ear: External ear normal.      Left Ear: External ear normal.      Nose: Nose normal.   Eyes:      Conjunctiva/sclera: Conjunctivae normal.      Pupils: Pupils are equal, round, and reactive to light.   Cardiovascular:      Rate and Rhythm: Normal rate and regular rhythm.      Heart sounds: Normal heart sounds.   Pulmonary:      Effort: Pulmonary effort is normal. No respiratory distress.      Breath sounds: Normal breath sounds. No wheezing.   Musculoskeletal:         General: Normal range of motion.      Cervical back: Normal range of motion and neck supple.      Comments: Normal gait   Skin:     General: Skin is warm and dry.   Neurological:      General: No focal deficit present.      Mental Status: She is alert and oriented to person, place, and time.   Psychiatric:         Mood and Affect: Mood normal.         Behavior: Behavior normal.         Thought Content: Thought content normal.         Judgment: Judgment normal.         Results for orders placed or performed in visit on 02/02/23   D-dimer, Quantitative    Specimen: Blood   Result Value Ref Range    D-Dimer, Quantitative 0.43 0.00 - 0.76 MCGFEU/mL   Comprehensive Metabolic Panel    Specimen: Blood   Result Value Ref Range    Glucose 85 65 - 99 mg/dL    BUN 20 8 - 23 mg/dL    Creatinine 0.80 0.57 - 1.00 mg/dL    Sodium 142 136 - 145 mmol/L    Potassium 3.9 3.5 - 5.2 mmol/L    Chloride 105 98 - 107 mmol/L    CO2 25.9 22.0 - 29.0 mmol/L    Calcium 9.1 8.6 - 10.5 mg/dL    Total Protein 6.7 6.0 - 8.5 g/dL    Albumin 4.1 3.5 - 5.2 g/dL    ALT (SGPT) 18 1 - 33 U/L    AST (SGOT) 19 1 - 32 U/L     Alkaline Phosphatase 56 39 - 117 U/L    Total Bilirubin 0.4 0.0 - 1.2 mg/dL    Globulin 2.6 gm/dL    A/G Ratio 1.6 g/dL    BUN/Creatinine Ratio 25.0 7.0 - 25.0    Anion Gap 11.1 5.0 - 15.0 mmol/L    eGFR 76.5 >60.0 mL/min/1.73   proBNP    Specimen: Blood   Result Value Ref Range    proBNP 153.0 0.0 - 1,800.0 pg/mL   CBC Auto Differential    Specimen: Blood   Result Value Ref Range    WBC 4.15 3.40 - 10.80 10*3/mm3    RBC 3.88 3.77 - 5.28 10*6/mm3    Hemoglobin 12.9 12.0 - 15.9 g/dL    Hematocrit 37.5 34.0 - 46.6 %    MCV 96.6 79.0 - 97.0 fL    MCH 33.2 (H) 26.6 - 33.0 pg    MCHC 34.4 31.5 - 35.7 g/dL    RDW 11.7 (L) 12.3 - 15.4 %    RDW-SD 41.2 37.0 - 54.0 fl    MPV 9.9 6.0 - 12.0 fL    Platelets 214 140 - 450 10*3/mm3    Neutrophil % 52.5 42.7 - 76.0 %    Lymphocyte % 33.5 19.6 - 45.3 %    Monocyte % 11.1 5.0 - 12.0 %    Eosinophil % 1.9 0.3 - 6.2 %    Basophil % 0.5 0.0 - 1.5 %    Immature Grans % 0.5 0.0 - 0.5 %    Neutrophils, Absolute 2.18 1.70 - 7.00 10*3/mm3    Lymphocytes, Absolute 1.39 0.70 - 3.10 10*3/mm3    Monocytes, Absolute 0.46 0.10 - 0.90 10*3/mm3    Eosinophils, Absolute 0.08 0.00 - 0.40 10*3/mm3    Basophils, Absolute 0.02 0.00 - 0.20 10*3/mm3    Immature Grans, Absolute 0.02 0.00 - 0.05 10*3/mm3    nRBC 0.0 0.0 - 0.2 /100 WBC   Lipid Panel    Specimen: Blood   Result Value Ref Range    Total Cholesterol 185 0 - 200 mg/dL    Triglycerides 107 0 - 150 mg/dL    HDL Cholesterol 68 (H) 40 - 60 mg/dL    LDL Cholesterol  98 0 - 100 mg/dL    VLDL Cholesterol 19 5 - 40 mg/dL    LDL/HDL Ratio 1.41      Result Review :                  Assessment and Plan    Diagnoses and all orders for this visit:    1. Medicare annual wellness visit, subsequent (Primary)    2. Acquired hypothyroidism - cont synthroid  -     CBC & Differential; Future  -     Comprehensive Metabolic Panel; Future  -     Lipid Panel With LDL / HDL Ratio; Future  -     TSH; Future  -     T4, Free; Future    3. Essential hypertension  -     CBC  & Differential; Future  -     Comprehensive Metabolic Panel; Future  -     Lipid Panel With LDL / HDL Ratio; Future  -     TSH; Future  -     T4, Free; Future    4. Coronary artery disease involving native coronary artery of native heart without angina pectoris- worsening light, soa.  Pt has stress test and echo scheduled tomorrow    5. Mixed hyperlipidemia  -     CBC & Differential; Future  -     Comprehensive Metabolic Panel; Future  -     Lipid Panel With LDL / HDL Ratio; Future  -     TSH; Future  -     T4, Free; Future    6. Gastroesophageal reflux disease, unspecified whether esophagitis present - cont pantoprazole    7. Mixed stress and urge urinary incontinence - worsening issue.  Pt has not tried meds.  Would rec trial of oxybutynin.  If stress test ok tomorrow will start oxybutynin.         BMI is >= 30 and <35. (Class 1 Obesity). The following options were offered after discussion;: exercise counseling/recommendations and nutrition counseling/recommendations             Outpatient Medications Prior to Visit   Medication Sig Dispense Refill   • acetaminophen (TYLENOL) 650 MG 8 hr tablet Take 1 tablet by mouth Every 8 (Eight) Hours As Needed.     • aspirin (aspirin) 81 MG EC tablet Take 1 tablet by mouth Daily. 90 tablet 3   • atorvastatin (LIPITOR) 40 MG tablet Take 0.5 tablets by mouth Daily. 90 tablet 3   • B Complex Vitamins (VITAMIN B COMPLEX IJ) Take 1 tablet by mouth 2 (two) times a day.     • carvedilol (COREG) 6.25 MG tablet TAKE TWO TABLETS BY MOUTH TWICE A DAY WITH MEALS 180 tablet 3   • Cholecalciferol (VITAMIN D-3) 1000 UNITS capsule Take 1,000 Units by mouth Every Night.     • CO ENZYME Q-10 PO Take 100 mg by mouth 2 (two) times a day.     • fexofenadine (ALLEGRA) 60 MG tablet Take 1 tablet by mouth Daily.     • Flaxseed, Linseed, (FLAXSEED OIL) 1200 MG capsule Take 2 capsules by mouth daily.     • Ginkgo Biloba Extract 60 MG capsule Take 1 tablet by mouth every morning.     •  Glucosamine-Chondroit-Vit C-Mn (GLUCOSAMINE CHONDR 1500 COMPLX) capsule Take 2 capsules by mouth every morning.     • isosorbide mononitrate (IMDUR) 30 MG 24 hr tablet Take 1 tablet by mouth Daily. 90 tablet 3   • levothyroxine (SYNTHROID, LEVOTHROID) 50 MCG tablet TAKE ONE TABLET BY MOUTH EVERY MORNING 90 tablet 1   • losartan-hydrochlorothiazide (HYZAAR) 100-25 MG per tablet TAKE ONE TABLET BY MOUTH DAILY 90 tablet 2   • Misc Natural Products (ECHINACEA GOLDENSEAL PLUS) capsule Take 900 mg by mouth every morning.     • Multiple Vitamin (MULTIVITAMINS PO) Take 1 tablet by mouth daily.     • pantoprazole (Protonix) 40 MG EC tablet Take 1 tablet by mouth Daily. 90 tablet 3   • Potassium Gluconate 595 MG capsule Take 595 mcg/day by mouth every morning.       No facility-administered medications prior to visit.     No orders of the defined types were placed in this encounter.    [unfilled]  There are no discontinued medications.      Return in about 6 months (around 8/28/2023) for Recheck, labs.    Patient was given instructions and counseling regarding her condition or for health maintenance advice. Please see specific information pulled into the AVS if appropriate.

## 2023-02-28 NOTE — PROGRESS NOTES
The ABCs of the Annual Wellness Visit  Initial Medicare Wellness Visit    Subjective     Carla Serrano is a 77 y.o. female who presents for an Initial Medicare Wellness Visit.    The following portions of the patient's history were reviewed and   updated as appropriate: allergies, current medications, past family history, past medical history, past social history, past surgical history and problem list.     Compared to one year ago, the patient feels her physical   health is the same.    Compared to one year ago, the patient feels her mental   health is the same.    Recent Hospitalizations:  She was not admitted to the hospital during the last year.       Current Medical Providers:  Patient Care Team:  Hilda Loera MD as PCP - General (Internal Medicine & Pediatrics)    Outpatient Medications Prior to Visit   Medication Sig Dispense Refill   • acetaminophen (TYLENOL) 650 MG 8 hr tablet Take 1 tablet by mouth Every 8 (Eight) Hours As Needed.     • aspirin (aspirin) 81 MG EC tablet Take 1 tablet by mouth Daily. 90 tablet 3   • atorvastatin (LIPITOR) 40 MG tablet Take 0.5 tablets by mouth Daily. 90 tablet 3   • B Complex Vitamins (VITAMIN B COMPLEX IJ) Take 1 tablet by mouth 2 (two) times a day.     • carvedilol (COREG) 6.25 MG tablet TAKE TWO TABLETS BY MOUTH TWICE A DAY WITH MEALS 180 tablet 3   • Cholecalciferol (VITAMIN D-3) 1000 UNITS capsule Take 1,000 Units by mouth Every Night.     • CO ENZYME Q-10 PO Take 100 mg by mouth 2 (two) times a day.     • fexofenadine (ALLEGRA) 60 MG tablet Take 1 tablet by mouth Daily.     • Flaxseed, Linseed, (FLAXSEED OIL) 1200 MG capsule Take 2 capsules by mouth daily.     • Ginkgo Biloba Extract 60 MG capsule Take 1 tablet by mouth every morning.     • Glucosamine-Chondroit-Vit C-Mn (GLUCOSAMINE CHONDR 1500 COMPLX) capsule Take 2 capsules by mouth every morning.     • isosorbide mononitrate (IMDUR) 30 MG 24 hr tablet Take 1 tablet by mouth Daily. 90 tablet 3   •  levothyroxine (SYNTHROID, LEVOTHROID) 50 MCG tablet TAKE ONE TABLET BY MOUTH EVERY MORNING 90 tablet 1   • losartan-hydrochlorothiazide (HYZAAR) 100-25 MG per tablet TAKE ONE TABLET BY MOUTH DAILY 90 tablet 2   • Misc Natural Products (ECHINACEA GOLDENSEAL PLUS) capsule Take 900 mg by mouth every morning.     • Multiple Vitamin (MULTIVITAMINS PO) Take 1 tablet by mouth daily.     • pantoprazole (Protonix) 40 MG EC tablet Take 1 tablet by mouth Daily. 90 tablet 3   • Potassium Gluconate 595 MG capsule Take 595 mcg/day by mouth every morning.       No facility-administered medications prior to visit.       No opioid medication identified on active medication list. I have reviewed chart for other potential  high risk medication/s and harmful drug interactions in the elderly.          Aspirin is on active medication list. Aspirin use is indicated based on review of current medical condition/s. Pros and cons of this therapy have been discussed today. Benefits of this medication outweigh potential harm.  Patient has been encouraged to continue taking this medication.  .      Patient Active Problem List   Diagnosis   • Edema   • DJD (degenerative joint disease) of knee   • Coronary artery disease of bypass graft of native heart with stable angina pectoris (HCC)   • SVT (supraventricular tachycardia) (HCC)   • Hypertension   • JOVANNA (obstructive sleep apnea)   • Hyperlipidemia   • Hypothyroidism   • Prediabetes   • Incontinence in female   • Encounter for screening for malignant neoplasm of colon   • Vomiting   • High risk medication use   • Reflux esophagitis   • Depression   • Epigastric pain     Advance Care Planning  Advance Directive is on file.  ACP discussion was held with the patient during this visit. Patient has an advance directive in EMR which is still valid.        Objective    Vitals:    02/28/23 1056   BP: 122/78   BP Location: Right arm   Pulse: 65   Temp: 97.8 °F (36.6 °C)   SpO2: 98%   Weight: 84.6 kg (186  "lb 9.6 oz)   Height: 160 cm (62.99\")     Estimated body mass index is 33.06 kg/m² as calculated from the following:    Height as of this encounter: 160 cm (62.99\").    Weight as of this encounter: 84.6 kg (186 lb 9.6 oz).    BMI is >= 30 and <35. (Class 1 Obesity). The following options were offered after discussion;: exercise counseling/recommendations and nutrition counseling/recommendations      Does the patient have evidence of cognitive impairment?   No    Lab Results   Component Value Date    TRIG 107 02/02/2023    HDL 68 (H) 02/02/2023    LDL 98 02/02/2023    VLDL 19 02/02/2023        HEALTH RISK ASSESSMENT    Smoking Status:  Social History     Tobacco Use   Smoking Status Never   Smokeless Tobacco Never     Alcohol Consumption:  Social History     Substance and Sexual Activity   Alcohol Use No     Fall Risk Screen:    STEADI Fall Risk Assessment was completed, and patient is at LOW risk for falls.Assessment completed on:2/28/2023    Depression Screen:   PHQ-2/PHQ-9 Depression Screening 2/28/2023   Little Interest or Pleasure in Doing Things 0-->not at all   Feeling Down, Depressed or Hopeless 0-->not at all   PHQ-9: Brief Depression Severity Measure Score 0       Health Habits and Functional and Cognitive Screening:  Functional & Cognitive Status 2/28/2023   Do you have difficulty preparing food and eating? No   Do you have difficulty bathing yourself, getting dressed or grooming yourself? No   Do you have difficulty using the toilet? No   Do you have difficulty moving around from place to place? No   Do you have trouble with steps or getting out of a bed or a chair? No   Current Diet Well Balanced Diet   Dental Exam Up to date   Eye Exam Up to date   Exercise (times per week) 2 times per week   Current Exercises Include Walking   Current Exercise Activities Include -   Do you need help using the phone?  No   Are you deaf or do you have serious difficulty hearing?  No   Do you need help with transportation? " No   Do you need help shopping? No   Do you need help preparing meals?  No   Do you need help with housework?  No   Do you need help with laundry? No   Do you need help taking your medications? No   Do you need help managing money? No   Do you ever drive or ride in a car without wearing a seat belt? No   Have you felt unusual stress, anger or loneliness in the last month? No   Who do you live with? Spouse   If you need help, do you have trouble finding someone available to you? No   Have you been bothered in the last four weeks by sexual problems? No   Do you have difficulty concentrating, remembering or making decisions? No       Age-appropriate Screening Schedule:  Refer to the list below for future screening recommendations based on patient's age, sex and/or medical conditions. Orders for these recommended tests are listed in the plan section. The patient has been provided with a written plan.    Health Maintenance   Topic Date Due   • ZOSTER VACCINE (1 of 2) Never done   • MAMMOGRAM  02/04/2022   • DXA SCAN  02/04/2023   • LIPID PANEL  02/02/2024   • ANNUAL WELLNESS VISIT  02/28/2024   • COLORECTAL CANCER SCREENING  03/29/2025   • TDAP/TD VACCINES (2 - Td or Tdap) 02/23/2026   • HEPATITIS C SCREENING  Completed   • COVID-19 Vaccine  Completed   • INFLUENZA VACCINE  Completed   • Pneumococcal Vaccine 65+  Completed          CMS Preventative Services Quick Reference  Risk Factors Identified During Encounter    Immunizations Discussed/Encouraged: Shingrix  Polypharmacy: Medication List reviewed, Medications are appropriate for patient and Medication changes were made    The above risks/problems have been discussed with the patient.  Pertinent information has been shared with the patient in the After Visit Summary.  An After Visit Summary and PPPS were made available to the patient.  Diagnoses and all orders for this visit:    1. Medicare annual wellness visit, subsequent (Primary)    2. Acquired hypothyroidism  -      CBC & Differential; Future  -     Comprehensive Metabolic Panel; Future  -     Lipid Panel With LDL / HDL Ratio; Future  -     TSH; Future  -     T4, Free; Future    3. Essential hypertension  -     CBC & Differential; Future  -     Comprehensive Metabolic Panel; Future  -     Lipid Panel With LDL / HDL Ratio; Future  -     TSH; Future  -     T4, Free; Future    4. Coronary artery disease involving native coronary artery of native heart without angina pectoris    5. Mixed hyperlipidemia  -     CBC & Differential; Future  -     Comprehensive Metabolic Panel; Future  -     Lipid Panel With LDL / HDL Ratio; Future  -     TSH; Future  -     T4, Free; Future    6. Gastroesophageal reflux disease, unspecified whether esophagitis present    7. Mixed stress and urge urinary incontinence    8. Post-menopausal  -     DEXA Bone Density Axial; Future    9. Encounter for screening mammogram for malignant neoplasm of breast  -     Mammo Screening Digital Tomosynthesis Bilateral With CAD; Future      Follow Up:  Next Medicare Wellness visit to be scheduled in 1 year.

## 2023-03-01 ENCOUNTER — HOSPITAL ENCOUNTER (OUTPATIENT)
Dept: NUCLEAR MEDICINE | Facility: HOSPITAL | Age: 78
Discharge: HOME | End: 2023-03-01
Payer: MEDICARE

## 2023-03-01 ENCOUNTER — HOSPITAL ENCOUNTER (OUTPATIENT)
Dept: CARDIOLOGY | Facility: HOSPITAL | Age: 78
Discharge: HOME | End: 2023-03-01
Payer: MEDICARE

## 2023-03-01 ENCOUNTER — HOSPITAL ENCOUNTER (OUTPATIENT)
Dept: CARDIOLOGY | Facility: HOSPITAL | Age: 78
Discharge: HOME | End: 2023-03-01
Admitting: INTERNAL MEDICINE
Payer: MEDICARE

## 2023-03-01 VITALS
BODY MASS INDEX: 34.23 KG/M2 | WEIGHT: 186 LBS | DIASTOLIC BLOOD PRESSURE: 78 MMHG | HEIGHT: 62 IN | SYSTOLIC BLOOD PRESSURE: 122 MMHG

## 2023-03-01 DIAGNOSIS — R06.02 EXERTIONAL SHORTNESS OF BREATH: ICD-10-CM

## 2023-03-01 LAB
AORTIC DIMENSIONLESS INDEX: 0.8 (DI)
BH CV ECHO MEAS - ACS: 1.7 CM
BH CV ECHO MEAS - AO MAX PG: 8.8 MMHG
BH CV ECHO MEAS - AO MEAN PG: 5 MMHG
BH CV ECHO MEAS - AO ROOT DIAM: 3.2 CM
BH CV ECHO MEAS - AO V2 MAX: 148 CM/SEC
BH CV ECHO MEAS - AO V2 VTI: 33 CM
BH CV ECHO MEAS - AVA(I,D): 2.36 CM2
BH CV ECHO MEAS - EDV(CUBED): 59.3 ML
BH CV ECHO MEAS - EDV(MOD-SP2): 60 ML
BH CV ECHO MEAS - EDV(MOD-SP4): 70 ML
BH CV ECHO MEAS - EF(MOD-BP): 67.1 %
BH CV ECHO MEAS - EF(MOD-SP2): 68.3 %
BH CV ECHO MEAS - EF(MOD-SP4): 60 %
BH CV ECHO MEAS - ESV(CUBED): 13.9 ML
BH CV ECHO MEAS - ESV(MOD-SP2): 19 ML
BH CV ECHO MEAS - ESV(MOD-SP4): 28 ML
BH CV ECHO MEAS - FS: 38.4 %
BH CV ECHO MEAS - IVS/LVPW: 1.11 CM
BH CV ECHO MEAS - IVSD: 1 CM
BH CV ECHO MEAS - LAT PEAK E' VEL: 8.7 CM/SEC
BH CV ECHO MEAS - LV DIASTOLIC VOL/BSA (35-75): 37.8 CM2
BH CV ECHO MEAS - LV MASS(C)D: 113.6 GRAMS
BH CV ECHO MEAS - LV MAX PG: 4.2 MMHG
BH CV ECHO MEAS - LV MEAN PG: 2 MMHG
BH CV ECHO MEAS - LV SYSTOLIC VOL/BSA (12-30): 15.1 CM2
BH CV ECHO MEAS - LV V1 MAX: 103 CM/SEC
BH CV ECHO MEAS - LV V1 VTI: 25.2 CM
BH CV ECHO MEAS - LVIDD: 3.9 CM
BH CV ECHO MEAS - LVIDS: 2.4 CM
BH CV ECHO MEAS - LVOT AREA: 3.1 CM2
BH CV ECHO MEAS - LVOT DIAM: 1.98 CM
BH CV ECHO MEAS - LVPWD: 0.9 CM
BH CV ECHO MEAS - MED PEAK E' VEL: 7.4 CM/SEC
BH CV ECHO MEAS - MV A MAX VEL: 102 CM/SEC
BH CV ECHO MEAS - MV DEC SLOPE: 443.1 CM/SEC2
BH CV ECHO MEAS - MV DEC TIME: 0.25 MSEC
BH CV ECHO MEAS - MV E MAX VEL: 129 CM/SEC
BH CV ECHO MEAS - MV E/A: 1.26
BH CV ECHO MEAS - MV MAX PG: 7.4 MMHG
BH CV ECHO MEAS - MV MEAN PG: 3.9 MMHG
BH CV ECHO MEAS - MV P1/2T: 95.4 MSEC
BH CV ECHO MEAS - MV V2 VTI: 41.6 CM
BH CV ECHO MEAS - MVA(P1/2T): 2.31 CM2
BH CV ECHO MEAS - MVA(VTI): 1.87 CM2
BH CV ECHO MEAS - PA V2 MAX: 97.9 CM/SEC
BH CV ECHO MEAS - QP/QS: 0.56
BH CV ECHO MEAS - RAP SYSTOLE: 3 MMHG
BH CV ECHO MEAS - RV MAX PG: 1.94 MMHG
BH CV ECHO MEAS - RV V1 MAX: 69.7 CM/SEC
BH CV ECHO MEAS - RV V1 VTI: 14.4 CM
BH CV ECHO MEAS - RVOT DIAM: 1.96 CM
BH CV ECHO MEAS - RVSP: 28.7 MMHG
BH CV ECHO MEAS - SI(MOD-SP2): 22.1 ML/M2
BH CV ECHO MEAS - SI(MOD-SP4): 22.7 ML/M2
BH CV ECHO MEAS - SV(LVOT): 77.9 ML
BH CV ECHO MEAS - SV(MOD-SP2): 41 ML
BH CV ECHO MEAS - SV(MOD-SP4): 42 ML
BH CV ECHO MEAS - SV(RVOT): 43.5 ML
BH CV ECHO MEAS - TR MAX PG: 25.7 MMHG
BH CV ECHO MEAS - TR MAX VEL: 253.5 CM/SEC
BH CV ECHO MEASUREMENTS AVERAGE E/E' RATIO: 16.02
BH CV REST NUCLEAR ISOTOPE DOSE: 11.4 MCI
BH CV STRESS BP STAGE 1: NORMAL
BH CV STRESS COMMENTS STAGE 1: NORMAL
BH CV STRESS DOSE REGADENOSON STAGE 1: 0.4
BH CV STRESS DURATION MIN STAGE 1: 0
BH CV STRESS DURATION SEC STAGE 1: 30
BH CV STRESS HR STAGE 1: 60
BH CV STRESS NUCLEAR ISOTOPE DOSE: 35.6 MCI
BH CV STRESS O2 STAGE 1: 92
BH CV STRESS PROTOCOL 1: NORMAL
BH CV STRESS RECOVERY BP: NORMAL MMHG
BH CV STRESS RECOVERY HR: 98 BPM
BH CV STRESS RECOVERY O2: 94 %
BH CV STRESS STAGE 1: 1
BH CV XLRA - TDI S': 11.1 CM/SEC
LEFT ATRIUM VOLUME INDEX: 33.3 ML/M2
LV EF NUC BP: 59 %
MAXIMAL PREDICTED HEART RATE: 144 BPM
MAXIMAL PREDICTED HEART RATE: 144 BPM
PERCENT MAX PREDICTED HR: 77.78 %
SINUS: 3.1 CM
STRESS BASELINE BP: NORMAL MMHG
STRESS BASELINE HR: 59 BPM
STRESS O2 SAT REST: 94 %
STRESS PERCENT HR: 92 %
STRESS POST ESTIMATED WORKLOAD: 1 METS
STRESS POST EXERCISE DUR SEC: 30 SEC
STRESS POST O2 SAT PEAK: 96 %
STRESS POST PEAK BP: NORMAL MMHG
STRESS POST PEAK HR: 112 BPM
STRESS TARGET HR: 122 BPM
STRESS TARGET HR: 122 BPM

## 2023-03-01 PROCEDURE — A9500 TC99M SESTAMIBI: HCPCS | Performed by: INTERNAL MEDICINE

## 2023-03-01 PROCEDURE — 93016 CV STRESS TEST SUPVJ ONLY: CPT | Performed by: INTERNAL MEDICINE

## 2023-03-01 PROCEDURE — 93017 CV STRESS TEST TRACING ONLY: CPT

## 2023-03-01 PROCEDURE — 93306 TTE W/DOPPLER COMPLETE: CPT

## 2023-03-01 PROCEDURE — 0 TECHNETIUM SESTAMIBI: Performed by: INTERNAL MEDICINE

## 2023-03-01 PROCEDURE — 78452 HT MUSCLE IMAGE SPECT MULT: CPT

## 2023-03-01 PROCEDURE — 93306 TTE W/DOPPLER COMPLETE: CPT | Performed by: INTERNAL MEDICINE

## 2023-03-01 PROCEDURE — 93018 CV STRESS TEST I&R ONLY: CPT | Performed by: INTERNAL MEDICINE

## 2023-03-01 PROCEDURE — 78452 HT MUSCLE IMAGE SPECT MULT: CPT | Performed by: INTERNAL MEDICINE

## 2023-03-01 PROCEDURE — 25010000002 REGADENOSON 0.4 MG/5ML SOLUTION: Performed by: INTERNAL MEDICINE

## 2023-03-01 RX ADMIN — TECHNETIUM TC 99M SESTAMIBI 1 DOSE: 1 INJECTION INTRAVENOUS at 08:52

## 2023-03-01 RX ADMIN — TECHNETIUM TC 99M SESTAMIBI 1 DOSE: 1 INJECTION INTRAVENOUS at 07:00

## 2023-03-01 RX ADMIN — REGADENOSON 0.4 MG: 0.08 INJECTION, SOLUTION INTRAVENOUS at 08:52

## 2023-03-01 NOTE — PROGRESS NOTES
Please notify Carla that both echocardiogram and stress test does not show significant abnormality that needs treatment.  Can you ask how her breathing has been or if it is getting better?

## 2023-03-02 ENCOUNTER — TELEPHONE (OUTPATIENT)
Dept: CARDIOLOGY | Facility: CLINIC | Age: 78
End: 2023-03-02
Payer: MEDICARE

## 2023-03-02 NOTE — TELEPHONE ENCOUNTER
Called and left VM. Will continue to try to reach patient.     Jaja Olsen RN  Triage [facility] [place]

## 2023-03-02 NOTE — TELEPHONE ENCOUNTER
----- Message from Antony Galvin MD sent at 3/1/2023  4:05 PM EST -----  Please notify Carla that both echocardiogram and stress test does not show significant abnormality that needs treatment.  Can you ask how her breathing has been or if it is getting better?

## 2023-03-02 NOTE — TELEPHONE ENCOUNTER
Called and spoke with patient. Went over results. She verbalized understanding.    She said she is having fewer episodes of SOA. If she gets them during exertion she stops to rest now.    Thank you,    Leeanne Cote RN  Triage [facility] [place]  03/02/23 11:16 EST

## 2023-04-17 RX ORDER — CARVEDILOL 6.25 MG/1
TABLET ORAL
Qty: 180 TABLET | Refills: 3 | Status: SHIPPED | OUTPATIENT
Start: 2023-04-17

## 2023-04-27 ENCOUNTER — OFFICE VISIT (OUTPATIENT)
Dept: CARDIOLOGY | Facility: CLINIC | Age: 78
End: 2023-04-27
Payer: MEDICARE

## 2023-04-27 VITALS
BODY MASS INDEX: 34.06 KG/M2 | HEIGHT: 62 IN | WEIGHT: 185.1 LBS | DIASTOLIC BLOOD PRESSURE: 78 MMHG | SYSTOLIC BLOOD PRESSURE: 120 MMHG | HEART RATE: 67 BPM

## 2023-04-27 DIAGNOSIS — I10 PRIMARY HYPERTENSION: ICD-10-CM

## 2023-04-27 DIAGNOSIS — E78.2 MIXED HYPERLIPIDEMIA: ICD-10-CM

## 2023-04-27 DIAGNOSIS — I25.708 CORONARY ARTERY DISEASE OF BYPASS GRAFT OF NATIVE HEART WITH STABLE ANGINA PECTORIS: Primary | ICD-10-CM

## 2023-04-27 DIAGNOSIS — I83.893 VARICOSE VEINS OF LEG WITH SWELLING, BILATERAL: ICD-10-CM

## 2023-05-05 ENCOUNTER — TELEPHONE (OUTPATIENT)
Dept: GASTROENTEROLOGY | Facility: CLINIC | Age: 78
End: 2023-05-05
Payer: MEDICARE

## 2023-05-05 NOTE — TELEPHONE ENCOUNTER
"    Caller: Carla Serrano \"Carla Serrano\"    Relationship to patient: Self    Best call back number: [phone]    Patient is needing: PATIENT HAS FAMILY SITUATION GOING ON AND EMOTIONALLY IS NOT PREPARED TO HAVE THIS SCOPE. SHE WOULD LIKE TO CANCEL FOR THE TIME BEING.           "

## 2023-05-17 RX ORDER — ATORVASTATIN CALCIUM 40 MG/1
TABLET, FILM COATED ORAL
Qty: 45 TABLET | Refills: 3 | Status: SHIPPED | OUTPATIENT
Start: 2023-05-17

## 2023-08-06 DIAGNOSIS — E03.9 ACQUIRED HYPOTHYROIDISM: ICD-10-CM

## 2023-08-08 RX ORDER — LEVOTHYROXINE SODIUM 0.05 MG/1
TABLET ORAL
Qty: 90 TABLET | Refills: 1 | Status: SHIPPED | OUTPATIENT
Start: 2023-08-08

## 2023-08-08 NOTE — TELEPHONE ENCOUNTER
Rx Refill Note  Requested Prescriptions     Pending Prescriptions Disp Refills    levothyroxine (SYNTHROID, LEVOTHROID) 50 MCG tablet [Pharmacy Med Name: LEVOTHYROXINE 50 MCG TABLET] 90 tablet 1     Sig: TAKE ONE TABLET BY MOUTH EVERY MORNING      Last office visit with prescribing clinician: 2/28/2023   Last telemedicine visit with prescribing clinician: Visit date not found   Next office visit with prescribing clinician: 8/29/2023                         Would you like a call back once the refill request has been completed: [] Yes [] No    If the office needs to give you a call back, can they leave a voicemail: [] Yes [] No    Maki Casanova, PCT  08/08/23, 11:05 EDT

## 2023-08-14 DIAGNOSIS — E78.2 MIXED HYPERLIPIDEMIA: ICD-10-CM

## 2023-08-14 DIAGNOSIS — I10 ESSENTIAL HYPERTENSION: ICD-10-CM

## 2023-08-14 DIAGNOSIS — E03.9 ACQUIRED HYPOTHYROIDISM: ICD-10-CM

## 2023-08-22 LAB
ALBUMIN SERPL-MCNC: 4.1 G/DL (ref 3.5–5.2)
ALBUMIN/GLOB SERPL: 2.1 G/DL
ALP SERPL-CCNC: 56 U/L (ref 39–117)
ALT SERPL-CCNC: 17 U/L (ref 1–33)
AST SERPL-CCNC: 20 U/L (ref 1–32)
BASOPHILS # BLD AUTO: 0.03 10*3/MM3 (ref 0–0.2)
BASOPHILS NFR BLD AUTO: 0.9 % (ref 0–1.5)
BILIRUB SERPL-MCNC: 0.6 MG/DL (ref 0–1.2)
BUN SERPL-MCNC: 13 MG/DL (ref 8–23)
BUN/CREAT SERPL: 17.1 (ref 7–25)
CALCIUM SERPL-MCNC: 9.7 MG/DL (ref 8.6–10.5)
CHLORIDE SERPL-SCNC: 105 MMOL/L (ref 98–107)
CHOLEST SERPL-MCNC: 174 MG/DL (ref 0–200)
CO2 SERPL-SCNC: 27.7 MMOL/L (ref 22–29)
CREAT SERPL-MCNC: 0.76 MG/DL (ref 0.57–1)
EGFRCR SERPLBLD CKD-EPI 2021: 80.8 ML/MIN/1.73
EOSINOPHIL # BLD AUTO: 0.13 10*3/MM3 (ref 0–0.4)
EOSINOPHIL NFR BLD AUTO: 3.8 % (ref 0.3–6.2)
ERYTHROCYTE [DISTWIDTH] IN BLOOD BY AUTOMATED COUNT: 12.1 % (ref 12.3–15.4)
GLOBULIN SER CALC-MCNC: 2 GM/DL
GLUCOSE SERPL-MCNC: 91 MG/DL (ref 65–99)
HCT VFR BLD AUTO: 35.5 % (ref 34–46.6)
HDLC SERPL-MCNC: 63 MG/DL (ref 40–60)
HGB BLD-MCNC: 12.6 G/DL (ref 12–15.9)
IMM GRANULOCYTES # BLD AUTO: 0.01 10*3/MM3 (ref 0–0.05)
IMM GRANULOCYTES NFR BLD AUTO: 0.3 % (ref 0–0.5)
LDLC SERPL CALC-MCNC: 93 MG/DL (ref 0–100)
LDLC/HDLC SERPL: 1.45 {RATIO}
LYMPHOCYTES # BLD AUTO: 1.14 10*3/MM3 (ref 0.7–3.1)
LYMPHOCYTES NFR BLD AUTO: 33.2 % (ref 19.6–45.3)
MCH RBC QN AUTO: 34.7 PG (ref 26.6–33)
MCHC RBC AUTO-ENTMCNC: 35.5 G/DL (ref 31.5–35.7)
MCV RBC AUTO: 97.8 FL (ref 79–97)
MONOCYTES # BLD AUTO: 0.34 10*3/MM3 (ref 0.1–0.9)
MONOCYTES NFR BLD AUTO: 9.9 % (ref 5–12)
NEUTROPHILS # BLD AUTO: 1.78 10*3/MM3 (ref 1.7–7)
NEUTROPHILS NFR BLD AUTO: 51.9 % (ref 42.7–76)
NRBC BLD AUTO-RTO: 0 /100 WBC (ref 0–0.2)
PLATELET # BLD AUTO: 209 10*3/MM3 (ref 140–450)
POTASSIUM SERPL-SCNC: 3.7 MMOL/L (ref 3.5–5.2)
PROT SERPL-MCNC: 6.1 G/DL (ref 6–8.5)
RBC # BLD AUTO: 3.63 10*6/MM3 (ref 3.77–5.28)
SODIUM SERPL-SCNC: 143 MMOL/L (ref 136–145)
T4 FREE SERPL-MCNC: 1.17 NG/DL (ref 0.93–1.7)
TRIGL SERPL-MCNC: 99 MG/DL (ref 0–150)
TSH SERPL DL<=0.005 MIU/L-ACNC: 1.71 UIU/ML (ref 0.27–4.2)
VLDLC SERPL CALC-MCNC: 18 MG/DL (ref 5–40)
WBC # BLD AUTO: 3.43 10*3/MM3 (ref 3.4–10.8)

## 2023-08-29 ENCOUNTER — OFFICE VISIT (OUTPATIENT)
Dept: INTERNAL MEDICINE | Facility: CLINIC | Age: 78
End: 2023-08-29
Payer: MEDICARE

## 2023-08-29 VITALS
HEART RATE: 59 BPM | TEMPERATURE: 97.7 F | WEIGHT: 184.8 LBS | OXYGEN SATURATION: 97 % | DIASTOLIC BLOOD PRESSURE: 82 MMHG | HEIGHT: 62 IN | SYSTOLIC BLOOD PRESSURE: 124 MMHG | BODY MASS INDEX: 34.01 KG/M2

## 2023-08-29 DIAGNOSIS — R79.9 ABNORMAL FINDING OF BLOOD CHEMISTRY, UNSPECIFIED: ICD-10-CM

## 2023-08-29 DIAGNOSIS — E78.2 MIXED HYPERLIPIDEMIA: ICD-10-CM

## 2023-08-29 DIAGNOSIS — N39.46 MIXED STRESS AND URGE URINARY INCONTINENCE: ICD-10-CM

## 2023-08-29 DIAGNOSIS — I25.10 CORONARY ARTERY DISEASE INVOLVING NATIVE CORONARY ARTERY OF NATIVE HEART WITHOUT ANGINA PECTORIS: ICD-10-CM

## 2023-08-29 DIAGNOSIS — E03.9 ACQUIRED HYPOTHYROIDISM: Primary | ICD-10-CM

## 2023-08-29 DIAGNOSIS — I10 ESSENTIAL HYPERTENSION: ICD-10-CM

## 2023-08-29 PROCEDURE — 3074F SYST BP LT 130 MM HG: CPT | Performed by: INTERNAL MEDICINE

## 2023-08-29 PROCEDURE — 99214 OFFICE O/P EST MOD 30 MIN: CPT | Performed by: INTERNAL MEDICINE

## 2023-08-29 PROCEDURE — 1160F RVW MEDS BY RX/DR IN RCRD: CPT | Performed by: INTERNAL MEDICINE

## 2023-08-29 PROCEDURE — 3079F DIAST BP 80-89 MM HG: CPT | Performed by: INTERNAL MEDICINE

## 2023-08-29 PROCEDURE — 1159F MED LIST DOCD IN RCRD: CPT | Performed by: INTERNAL MEDICINE

## 2023-08-29 RX ORDER — AMOXICILLIN 500 MG/1
500 CAPSULE ORAL AS NEEDED
COMMUNITY
Start: 2023-07-07

## 2023-08-29 RX ORDER — OXYBUTYNIN CHLORIDE 10 MG/1
10 TABLET, EXTENDED RELEASE ORAL DAILY
Qty: 30 TABLET | Refills: 0 | Status: SHIPPED | OUTPATIENT
Start: 2023-08-29

## 2023-08-29 NOTE — PROGRESS NOTES
Carla Serrano is a 78 y.o. female, who presents with a chief complaint of   Chief Complaint   Patient presents with    Hypothyroidism     F/U           HPI   Pt here for f/u     She had cataract surgery last week and is doing well.     Pt omer a stress test and echo since her last OV.  She has felt better since thern.  When she wakes up in the morning she feels dizzy and she has to take her time to get going.  She limits fluids at night so she doesn't have to urinate a lot at night.  She feels fine the rest of the day. Her LE edema continues to be a chronic issue.  Cardiology wrote her a rx for compression socks but she doesn't want to wear them.      Mixed stress and urge incontinence - she wears pads daily.  She has not been on meds in the past.       Hypothyroidism - no hair/skin changes.      HLD - on statin.  No cramps or myalgias.      From last cardiology note:  Hx of CAD s/p CABGX3 in 2011 with LIMA to LAD, SVG to OM and SVG to PDA. Myocardial perfusion study on 3/24/2022 showed small inferolateral scar with mild cora-infarct ischemia. Prior echocardiogram shows preserved left ventricular ejection fraction.Hx of intolerance to Plavix started during prior visits  Hypercholesterolemia-on high intensity statin-lipid panel near goal  Hypertension-on Coreg and losartan/hydrochlorthiazide-controlled  History of paroxysmal SVT in the perioop periord in 2011-remains in sinus rhythm  Obstructive sleep apnea but could not tolerate CPAP   Chronic degenerative joint disease-left knee replaced.    Bilateral lower extremity pitting edema -about the same      Ms. Serrano here for follow-up visit for shortness of breath.  During visit in February it was worse and she subsequently had D-dimer, proBNP checked which were normal.  She has increased level of activity with significantly improved breathing.  Lower extremity remains about the same or only slightly improved.  Her weight is down 2 pounds.  No other  significant complaints today.  She will start walk more regularly.  Continue current care.  Prescription given for compression stocking.  PVCs improved on today's tracing     The following portions of the patient's history were reviewed and updated as appropriate: allergies, current medications, past family history, past medical history, past social history, past surgical history and problem list.    Allergies: Latex    Review of Systems   Constitutional: Negative.    HENT: Negative.     Eyes: Negative.    Respiratory: Negative.     Cardiovascular: Negative.    Gastrointestinal: Negative.    Endocrine: Negative.    Genitourinary: Negative.    Musculoskeletal: Negative.    Skin: Negative.    Allergic/Immunologic: Negative.    Neurological: Negative.    Hematological: Negative.    Psychiatric/Behavioral: Negative.     All other systems reviewed and are negative.          Wt Readings from Last 3 Encounters:   08/29/23 83.8 kg (184 lb 12.8 oz)   04/27/23 84 kg (185 lb 1.6 oz)   03/01/23 84.4 kg (186 lb)     Temp Readings from Last 3 Encounters:   08/29/23 97.7 øF (36.5 øC) (Infrared)   02/28/23 97.8 øF (36.6 øC)   08/29/22 97.5 øF (36.4 øC)     BP Readings from Last 3 Encounters:   08/29/23 124/82   04/27/23 120/78   03/01/23 122/78     Pulse Readings from Last 3 Encounters:   08/29/23 59   04/27/23 67   02/28/23 65     Body mass index is 33.8 kg/mý.  SpO2 Readings from Last 3 Encounters:   08/29/23 97%   02/28/23 98%   02/02/23 95%          Physical Exam  Vitals and nursing note reviewed.   Constitutional:       General: She is not in acute distress.     Appearance: She is well-developed.   HENT:      Head: Normocephalic and atraumatic.      Right Ear: External ear normal.      Left Ear: External ear normal.      Nose: Nose normal.   Eyes:      Conjunctiva/sclera: Conjunctivae normal.      Pupils: Pupils are equal, round, and reactive to light.   Cardiovascular:      Rate and Rhythm: Normal rate and regular rhythm.       Heart sounds: Normal heart sounds.   Pulmonary:      Effort: Pulmonary effort is normal. No respiratory distress.      Breath sounds: Normal breath sounds. No wheezing.   Musculoskeletal:         General: Normal range of motion.      Cervical back: Normal range of motion and neck supple.      Comments: Normal gait   Skin:     General: Skin is warm and dry.   Neurological:      Mental Status: She is alert and oriented to person, place, and time.   Psychiatric:         Behavior: Behavior normal.         Thought Content: Thought content normal.         Judgment: Judgment normal.       Results for orders placed or performed in visit on 08/14/23   T4, Free    Specimen: Blood   Result Value Ref Range    Free T4 1.17 0.93 - 1.70 ng/dL   TSH    Specimen: Blood   Result Value Ref Range    TSH 1.710 0.270 - 4.200 uIU/mL   Lipid Panel With LDL / HDL Ratio    Specimen: Blood   Result Value Ref Range    Total Cholesterol 174 0 - 200 mg/dL    Triglycerides 99 0 - 150 mg/dL    HDL Cholesterol 63 (H) 40 - 60 mg/dL    VLDL Cholesterol Jose Juan 18 5 - 40 mg/dL    LDL Chol Calc (NIH) 93 0 - 100 mg/dL    LDL/HDL RATIO 1.45    Comprehensive Metabolic Panel    Specimen: Blood   Result Value Ref Range    Glucose 91 65 - 99 mg/dL    BUN 13 8 - 23 mg/dL    Creatinine 0.76 0.57 - 1.00 mg/dL    EGFR Result 80.8 >60.0 mL/min/1.73    BUN/Creatinine Ratio 17.1 7.0 - 25.0    Sodium 143 136 - 145 mmol/L    Potassium 3.7 3.5 - 5.2 mmol/L    Chloride 105 98 - 107 mmol/L    Total CO2 27.7 22.0 - 29.0 mmol/L    Calcium 9.7 8.6 - 10.5 mg/dL    Total Protein 6.1 6.0 - 8.5 g/dL    Albumin 4.1 3.5 - 5.2 g/dL    Globulin 2.0 gm/dL    A/G Ratio 2.1 g/dL    Total Bilirubin 0.6 0.0 - 1.2 mg/dL    Alkaline Phosphatase 56 39 - 117 U/L    AST (SGOT) 20 1 - 32 U/L    ALT (SGPT) 17 1 - 33 U/L   CBC & Differential    Specimen: Blood   Result Value Ref Range    WBC 3.43 3.40 - 10.80 10*3/mm3    RBC 3.63 (L) 3.77 - 5.28 10*6/mm3    Hemoglobin 12.6 12.0 - 15.9 g/dL     Hematocrit 35.5 34.0 - 46.6 %    MCV 97.8 (H) 79.0 - 97.0 fL    MCH 34.7 (H) 26.6 - 33.0 pg    MCHC 35.5 31.5 - 35.7 g/dL    RDW 12.1 (L) 12.3 - 15.4 %    Platelets 209 140 - 450 10*3/mm3    Neutrophil Rel % 51.9 42.7 - 76.0 %    Lymphocyte Rel % 33.2 19.6 - 45.3 %    Monocyte Rel % 9.9 5.0 - 12.0 %    Eosinophil Rel % 3.8 0.3 - 6.2 %    Basophil Rel % 0.9 0.0 - 1.5 %    Neutrophils Absolute 1.78 1.70 - 7.00 10*3/mm3    Lymphocytes Absolute 1.14 0.70 - 3.10 10*3/mm3    Monocytes Absolute 0.34 0.10 - 0.90 10*3/mm3    Eosinophils Absolute 0.13 0.00 - 0.40 10*3/mm3    Basophils Absolute 0.03 0.00 - 0.20 10*3/mm3    Immature Granulocyte Rel % 0.3 0.0 - 0.5 %    Immature Grans Absolute 0.01 0.00 - 0.05 10*3/mm3    nRBC 0.0 0.0 - 0.2 /100 WBC     Result Review :                  Assessment and Plan    Diagnoses and all orders for this visit:    1. Acquired hypothyroidism (Primary)  -     Hemoglobin A1c; Future  -     Comprehensive Metabolic Panel; Future  -     CBC & Differential; Future  -     Lipid Panel With LDL / HDL Ratio; Future  -     T4, Free; Future  -     TSH; Future    2. Essential hypertension  -     Hemoglobin A1c; Future  -     Comprehensive Metabolic Panel; Future  -     CBC & Differential; Future  -     Lipid Panel With LDL / HDL Ratio; Future  -     T4, Free; Future  -     TSH; Future    3. Mixed hyperlipidemia  -     Comprehensive Metabolic Panel; Future  -     Lipid Panel With LDL / HDL Ratio; Future    4. Coronary artery disease involving native coronary artery of native heart without angina pectoris    5. Mixed stress and urge urinary incontinence  -     oxybutynin XL (DITROPAN-XL) 10 MG 24 hr tablet; Take 1 tablet by mouth Daily.  Dispense: 30 tablet; Refill: 0    6. Abnormal finding of blood chemistry, unspecified  -     Hemoglobin A1c; Future                       Outpatient Medications Prior to Visit   Medication Sig Dispense Refill    acetaminophen (TYLENOL) 650 MG 8 hr tablet Take 1 tablet by  mouth Every 8 (Eight) Hours As Needed.      amoxicillin (AMOXIL) 500 MG capsule Take 1 capsule by mouth As Needed.      aspirin (aspirin) 81 MG EC tablet Take 1 tablet by mouth Daily. 90 tablet 3    atorvastatin (LIPITOR) 40 MG tablet TAKE 1/2 TABLET BY MOUTH DAILY 45 tablet 3    B Complex Vitamins (VITAMIN B COMPLEX IJ) Take 1 tablet by mouth 2 (two) times a day.      carvedilol (COREG) 6.25 MG tablet TAKE TWO TABLETS BY MOUTH TWICE A DAY WITH MEALS 180 tablet 3    Cholecalciferol (VITAMIN D-3) 1000 UNITS capsule Take 1,000 Units by mouth Every Night.      CO ENZYME Q-10 PO Take 100 mg by mouth 2 (two) times a day.      fexofenadine (ALLEGRA) 60 MG tablet Take 1 tablet by mouth Daily.      Flaxseed, Linseed, (FLAXSEED OIL) 1200 MG capsule Take 2 capsules by mouth daily.      Ginkgo Biloba Extract 60 MG capsule Take 1 tablet by mouth every morning.      Glucosamine-Chondroit-Vit C-Mn (GLUCOSAMINE CHONDR 1500 COMPLX) capsule Take 2 capsules by mouth every morning.      isosorbide mononitrate (IMDUR) 30 MG 24 hr tablet Take 1 tablet by mouth Daily. 90 tablet 3    levothyroxine (SYNTHROID, LEVOTHROID) 50 MCG tablet TAKE ONE TABLET BY MOUTH EVERY MORNING 90 tablet 1    losartan-hydrochlorothiazide (HYZAAR) 100-25 MG per tablet TAKE ONE TABLET BY MOUTH DAILY 90 tablet 2    Misc Natural Products (ECHINACEA GOLDENSEAL PLUS) capsule Take 900 mg by mouth every morning.      Multiple Vitamin (MULTIVITAMINS PO) Take 1 tablet by mouth daily.      pantoprazole (Protonix) 40 MG EC tablet Take 1 tablet by mouth Daily. 90 tablet 3    Potassium Gluconate 595 MG capsule Take 595 mcg/day by mouth every morning.       No facility-administered medications prior to visit.     New Medications Ordered This Visit   Medications    oxybutynin XL (DITROPAN-XL) 10 MG 24 hr tablet     Sig: Take 1 tablet by mouth Daily.     Dispense:  30 tablet     Refill:  0     [unfilled]  There are no discontinued medications.      Return in about 6  months (around 2/29/2024) for Recheck, labs.    Patient was given instructions and counseling regarding her condition or for health maintenance advice. Please see specific information pulled into the AVS if appropriate.

## 2023-09-01 ENCOUNTER — APPOINTMENT (OUTPATIENT)
Dept: BONE DENSITY | Facility: HOSPITAL | Age: 78
End: 2023-09-01
Payer: MEDICARE

## 2023-09-01 ENCOUNTER — HOSPITAL ENCOUNTER (OUTPATIENT)
Dept: MAMMOGRAPHY | Facility: HOSPITAL | Age: 78
Discharge: HOME | End: 2023-09-01
Payer: MEDICARE

## 2023-09-01 DIAGNOSIS — Z78.0 POST-MENOPAUSAL: ICD-10-CM

## 2023-09-01 DIAGNOSIS — Z12.31 ENCOUNTER FOR SCREENING MAMMOGRAM FOR MALIGNANT NEOPLASM OF BREAST: ICD-10-CM

## 2023-09-01 PROCEDURE — 77063 BREAST TOMOSYNTHESIS BI: CPT

## 2023-09-01 PROCEDURE — 77067 SCR MAMMO BI INCL CAD: CPT

## 2023-09-01 PROCEDURE — 77080 DXA BONE DENSITY AXIAL: CPT

## 2023-09-13 ENCOUNTER — TELEPHONE (OUTPATIENT)
Dept: INTERNAL MEDICINE | Facility: CLINIC | Age: 78
End: 2023-09-13
Payer: MEDICARE

## 2023-09-13 NOTE — TELEPHONE ENCOUNTER
Left vm to call back    -----hub to read- please schedule her an appt.-----  Just set her up for a video visit or f/u visit in person and I can review options with her

## 2023-09-27 DIAGNOSIS — N39.46 MIXED STRESS AND URGE URINARY INCONTINENCE: ICD-10-CM

## 2023-09-27 RX ORDER — OXYBUTYNIN CHLORIDE 10 MG/1
10 TABLET, EXTENDED RELEASE ORAL DAILY
Qty: 30 TABLET | Refills: 0 | Status: SHIPPED | OUTPATIENT
Start: 2023-09-27

## 2023-10-12 RX ORDER — CARVEDILOL 6.25 MG/1
TABLET ORAL
Qty: 180 TABLET | Refills: 1 | Status: SHIPPED | OUTPATIENT
Start: 2023-10-12

## 2023-10-18 RX ORDER — ISOSORBIDE MONONITRATE 30 MG/1
30 TABLET, EXTENDED RELEASE ORAL DAILY
Qty: 90 TABLET | Refills: 3 | Status: SHIPPED | OUTPATIENT
Start: 2023-10-18

## 2023-11-02 ENCOUNTER — OFFICE VISIT (OUTPATIENT)
Dept: CARDIOLOGY | Facility: CLINIC | Age: 78
End: 2023-11-02
Payer: MEDICARE

## 2023-11-02 VITALS
SYSTOLIC BLOOD PRESSURE: 122 MMHG | WEIGHT: 186 LBS | HEART RATE: 76 BPM | DIASTOLIC BLOOD PRESSURE: 76 MMHG | BODY MASS INDEX: 34.23 KG/M2 | OXYGEN SATURATION: 96 % | HEIGHT: 62 IN

## 2023-11-02 DIAGNOSIS — I25.708 CORONARY ARTERY DISEASE OF BYPASS GRAFT OF NATIVE HEART WITH STABLE ANGINA PECTORIS: Primary | ICD-10-CM

## 2023-11-02 DIAGNOSIS — E78.2 MIXED HYPERLIPIDEMIA: ICD-10-CM

## 2023-11-02 DIAGNOSIS — I10 PRIMARY HYPERTENSION: ICD-10-CM

## 2023-11-02 PROCEDURE — 3078F DIAST BP <80 MM HG: CPT | Performed by: INTERNAL MEDICINE

## 2023-11-02 PROCEDURE — 3074F SYST BP LT 130 MM HG: CPT | Performed by: INTERNAL MEDICINE

## 2023-11-02 PROCEDURE — 99214 OFFICE O/P EST MOD 30 MIN: CPT | Performed by: INTERNAL MEDICINE

## 2023-11-02 RX ORDER — ACETYLCYSTEINE 600 MG
200 CAPSULE ORAL 4 TIMES DAILY
COMMUNITY

## 2023-12-28 RX ORDER — PANTOPRAZOLE SODIUM 40 MG/1
40 TABLET, DELAYED RELEASE ORAL DAILY
Qty: 90 TABLET | Refills: 3 | Status: SHIPPED | OUTPATIENT
Start: 2023-12-28

## 2024-01-12 RX ORDER — CARVEDILOL 6.25 MG/1
TABLET ORAL
Qty: 360 TABLET | Refills: 0 | Status: SHIPPED | OUTPATIENT
Start: 2024-01-12

## 2024-01-27 DIAGNOSIS — E03.9 ACQUIRED HYPOTHYROIDISM: ICD-10-CM

## 2024-01-29 RX ORDER — LEVOTHYROXINE SODIUM 0.05 MG/1
TABLET ORAL
Qty: 90 TABLET | Refills: 1 | Status: SHIPPED | OUTPATIENT
Start: 2024-01-29

## 2024-02-14 DIAGNOSIS — E78.2 MIXED HYPERLIPIDEMIA: ICD-10-CM

## 2024-02-14 DIAGNOSIS — E03.9 ACQUIRED HYPOTHYROIDISM: ICD-10-CM

## 2024-02-14 DIAGNOSIS — R79.9 ABNORMAL FINDING OF BLOOD CHEMISTRY, UNSPECIFIED: ICD-10-CM

## 2024-02-14 DIAGNOSIS — I10 ESSENTIAL HYPERTENSION: ICD-10-CM

## 2024-02-24 LAB
ALBUMIN SERPL-MCNC: 4.3 G/DL (ref 3.5–5.2)
ALBUMIN/GLOB SERPL: 2.2 G/DL
ALP SERPL-CCNC: 54 U/L (ref 39–117)
ALT SERPL-CCNC: 19 U/L (ref 1–33)
AST SERPL-CCNC: 18 U/L (ref 1–32)
BASOPHILS # BLD AUTO: 0.02 10*3/MM3 (ref 0–0.2)
BASOPHILS NFR BLD AUTO: 0.6 % (ref 0–1.5)
BILIRUB SERPL-MCNC: 0.6 MG/DL (ref 0–1.2)
BUN SERPL-MCNC: 17 MG/DL (ref 8–23)
BUN/CREAT SERPL: 21.5 (ref 7–25)
CALCIUM SERPL-MCNC: 9.7 MG/DL (ref 8.6–10.5)
CHLORIDE SERPL-SCNC: 105 MMOL/L (ref 98–107)
CHOLEST SERPL-MCNC: 184 MG/DL (ref 0–200)
CO2 SERPL-SCNC: 25.9 MMOL/L (ref 22–29)
CREAT SERPL-MCNC: 0.79 MG/DL (ref 0.57–1)
EGFRCR SERPLBLD CKD-EPI 2021: 77.2 ML/MIN/1.73
EOSINOPHIL # BLD AUTO: 0.07 10*3/MM3 (ref 0–0.4)
EOSINOPHIL NFR BLD AUTO: 1.9 % (ref 0.3–6.2)
ERYTHROCYTE [DISTWIDTH] IN BLOOD BY AUTOMATED COUNT: 12 % (ref 12.3–15.4)
GLOBULIN SER CALC-MCNC: 2 GM/DL
GLUCOSE SERPL-MCNC: 88 MG/DL (ref 65–99)
HBA1C MFR BLD: 4.9 % (ref 4.8–5.6)
HCT VFR BLD AUTO: 37.4 % (ref 34–46.6)
HDLC SERPL-MCNC: 66 MG/DL (ref 40–60)
HGB BLD-MCNC: 12.8 G/DL (ref 12–15.9)
IMM GRANULOCYTES # BLD AUTO: 0 10*3/MM3 (ref 0–0.05)
IMM GRANULOCYTES NFR BLD AUTO: 0 % (ref 0–0.5)
LDLC SERPL CALC-MCNC: 99 MG/DL (ref 0–100)
LDLC/HDLC SERPL: 1.47 {RATIO}
LYMPHOCYTES # BLD AUTO: 1.06 10*3/MM3 (ref 0.7–3.1)
LYMPHOCYTES NFR BLD AUTO: 29.2 % (ref 19.6–45.3)
MCH RBC QN AUTO: 34 PG (ref 26.6–33)
MCHC RBC AUTO-ENTMCNC: 34.2 G/DL (ref 31.5–35.7)
MCV RBC AUTO: 99.2 FL (ref 79–97)
MONOCYTES # BLD AUTO: 0.29 10*3/MM3 (ref 0.1–0.9)
MONOCYTES NFR BLD AUTO: 8 % (ref 5–12)
NEUTROPHILS # BLD AUTO: 2.19 10*3/MM3 (ref 1.7–7)
NEUTROPHILS NFR BLD AUTO: 60.3 % (ref 42.7–76)
NRBC BLD AUTO-RTO: 0 /100 WBC (ref 0–0.2)
PLATELET # BLD AUTO: 220 10*3/MM3 (ref 140–450)
POTASSIUM SERPL-SCNC: 3.6 MMOL/L (ref 3.5–5.2)
PROT SERPL-MCNC: 6.3 G/DL (ref 6–8.5)
RBC # BLD AUTO: 3.77 10*6/MM3 (ref 3.77–5.28)
SODIUM SERPL-SCNC: 142 MMOL/L (ref 136–145)
T4 FREE SERPL-MCNC: 1.54 NG/DL (ref 0.93–1.7)
TRIGL SERPL-MCNC: 105 MG/DL (ref 0–150)
TSH SERPL DL<=0.005 MIU/L-ACNC: 1.54 UIU/ML (ref 0.27–4.2)
VLDLC SERPL CALC-MCNC: 19 MG/DL (ref 5–40)
WBC # BLD AUTO: 3.63 10*3/MM3 (ref 3.4–10.8)

## 2024-03-01 ENCOUNTER — OFFICE VISIT (OUTPATIENT)
Dept: INTERNAL MEDICINE | Facility: CLINIC | Age: 79
End: 2024-03-01
Payer: MEDICARE

## 2024-03-01 VITALS
OXYGEN SATURATION: 94 % | DIASTOLIC BLOOD PRESSURE: 76 MMHG | HEART RATE: 75 BPM | TEMPERATURE: 97.5 F | SYSTOLIC BLOOD PRESSURE: 116 MMHG | WEIGHT: 183.9 LBS | BODY MASS INDEX: 33.84 KG/M2 | HEIGHT: 62 IN

## 2024-03-01 DIAGNOSIS — I25.708 CORONARY ARTERY DISEASE OF BYPASS GRAFT OF NATIVE HEART WITH STABLE ANGINA PECTORIS: ICD-10-CM

## 2024-03-01 DIAGNOSIS — E78.2 MIXED HYPERLIPIDEMIA: ICD-10-CM

## 2024-03-01 DIAGNOSIS — R32 INCONTINENCE IN FEMALE: ICD-10-CM

## 2024-03-01 DIAGNOSIS — I10 PRIMARY HYPERTENSION: ICD-10-CM

## 2024-03-01 DIAGNOSIS — I89.0 LYMPHEDEMA: ICD-10-CM

## 2024-03-01 DIAGNOSIS — R73.03 PREDIABETES: ICD-10-CM

## 2024-03-01 DIAGNOSIS — G47.9 SLEEP DIFFICULTIES: ICD-10-CM

## 2024-03-01 DIAGNOSIS — E03.9 ACQUIRED HYPOTHYROIDISM: ICD-10-CM

## 2024-03-01 DIAGNOSIS — Z00.00 MEDICARE ANNUAL WELLNESS VISIT, SUBSEQUENT: Primary | ICD-10-CM

## 2024-03-01 RX ORDER — TRAZODONE HYDROCHLORIDE 50 MG/1
25 TABLET ORAL NIGHTLY
Qty: 15 TABLET | Refills: 0 | Status: SHIPPED | OUTPATIENT
Start: 2024-03-01

## 2024-03-01 RX ORDER — SOLIFENACIN SUCCINATE 10 MG/1
10 TABLET, FILM COATED ORAL DAILY
Qty: 30 TABLET | Refills: 0 | Status: SHIPPED | OUTPATIENT
Start: 2024-03-01

## 2024-03-01 NOTE — PROGRESS NOTES
The ABCs of the Annual Wellness Visit  Subsequent Medicare Wellness Visit    Subjective    Carla Serrano is a 78 y.o. female who presents for a Subsequent Medicare Wellness Visit.    The following portions of the patient's history were reviewed and   updated as appropriate: allergies, current medications, past family history, past medical history, past social history, past surgical history, and problem list.    Compared to one year ago, the patient feels her physical   health is the same.    Compared to one year ago, the patient feels her mental   health is the same.    Recent Hospitalizations:  She was not admitted to the hospital during the last year.       Current Medical Providers:  Patient Care Team:  Hilda Loera MD as PCP - General (Internal Medicine & Pediatrics)    Outpatient Medications Prior to Visit   Medication Sig Dispense Refill    acetaminophen (TYLENOL) 650 MG 8 hr tablet Take 1 tablet by mouth Every 8 (Eight) Hours As Needed.      aspirin (aspirin) 81 MG EC tablet Take 1 tablet by mouth Daily. 90 tablet 3    atorvastatin (LIPITOR) 40 MG tablet TAKE 1/2 TABLET BY MOUTH DAILY 45 tablet 3    B Complex Vitamins (VITAMIN B COMPLEX IJ) Take 1 tablet by mouth 2 (two) times a day.      carvedilol (COREG) 6.25 MG tablet TAKE TWO TABLETS BY MOUTH TWICE A DAY WITH A MEAL 360 tablet 0    Cholecalciferol (VITAMIN D-3) 1000 UNITS capsule Take 1,000 Units by mouth Every Night.      CO ENZYME Q-10 PO Take 100 mg by mouth 2 (two) times a day.      fexofenadine (ALLEGRA) 60 MG tablet Take 1 tablet by mouth Daily.      Flaxseed, Linseed, (FLAXSEED OIL) 1200 MG capsule Take 2 capsules by mouth daily.      Ginkgo Biloba Extract 60 MG capsule Take 1 tablet by mouth every morning.      Glucosamine-Chondroit-Vit C-Mn (GLUCOSAMINE CHONDR 1500 COMPLX) capsule Take 2 capsules by mouth every morning.      isosorbide mononitrate (IMDUR) 30 MG 24 hr tablet TAKE ONE TABLET BY MOUTH DAILY 90 tablet 3     levothyroxine (SYNTHROID, LEVOTHROID) 50 MCG tablet TAKE ONE TABLET BY MOUTH EVERY MORNING 90 tablet 1    losartan-hydrochlorothiazide (HYZAAR) 100-25 MG per tablet TAKE ONE TABLET BY MOUTH DAILY 90 tablet 2    Misc Natural Products (ECHINACEA GOLDENSEAL PLUS) capsule Take 900 mg by mouth every morning.      Multiple Vitamin (MULTIVITAMINS PO) Take 1 tablet by mouth daily.      pantoprazole (PROTONIX) 40 MG EC tablet TAKE ONE TABLET BY MOUTH DAILY 90 tablet 3    Potassium Gluconate 595 MG capsule Take 595 mcg/day by mouth every morning.      S-Adenosylmethionine (ALLY-e) 200 MG capsule Take 200 mg by mouth 4 (Four) Times a Day.      amoxicillin (AMOXIL) 500 MG capsule Take 1 capsule by mouth As Needed (use before Dentist). (Patient not taking: Reported on 3/1/2024)      oxybutynin XL (DITROPAN-XL) 10 MG 24 hr tablet TAKE 1 TABLET BY MOUTH DAILY (Patient not taking: Reported on 11/2/2023) 30 tablet 0     No facility-administered medications prior to visit.       No opioid medication identified on active medication list. I have reviewed chart for other potential  high risk medication/s and harmful drug interactions in the elderly.        Aspirin is on active medication list. Aspirin use is indicated based on review of current medical condition/s. Pros and cons of this therapy have been discussed today. Benefits of this medication outweigh potential harm.  Patient has been encouraged to continue taking this medication.  .      Patient Active Problem List   Diagnosis    Edema    DJD (degenerative joint disease) of knee    Coronary artery disease of bypass graft of native heart with stable angina pectoris    SVT (supraventricular tachycardia)    Hypertension    JOVANNA (obstructive sleep apnea)    Hyperlipidemia    Hypothyroidism    Prediabetes    Incontinence in female    Encounter for screening for malignant neoplasm of colon    Vomiting    High risk medication use    Reflux esophagitis    Depression    Epigastric pain  "    Advance Care Planning   Advance Care Planning     Advance Directive is on file.  ACP discussion was held with the patient during this visit. Patient has an advance directive in EMR which is still valid.      Objective    Vitals:    03/01/24 1439   BP: 116/76   BP Location: Left arm   Patient Position: Sitting   Cuff Size: Large Adult   Pulse: 75   Temp: 97.5 °F (36.4 °C)   TempSrc: Infrared   SpO2: 94%   Weight: 83.4 kg (183 lb 14.4 oz)   Height: 157.5 cm (62.01\")     Estimated body mass index is 33.63 kg/m² as calculated from the following:    Height as of this encounter: 157.5 cm (62.01\").    Weight as of this encounter: 83.4 kg (183 lb 14.4 oz).    BMI is >= 30 and <35. (Class 1 Obesity). The following options were offered after discussion;: exercise counseling/recommendations and nutrition counseling/recommendations      Does the patient have evidence of cognitive impairment? No    Lab Results   Component Value Date    CHLPL 184 02/23/2024    TRIG 105 02/23/2024    HDL 66 (H) 02/23/2024    LDL 99 02/23/2024    VLDL 19 02/23/2024    HGBA1C 4.90 02/23/2024        HEALTH RISK ASSESSMENT    Smoking Status:  Social History     Tobacco Use   Smoking Status Never   Smokeless Tobacco Never     Alcohol Consumption:  Social History     Substance and Sexual Activity   Alcohol Use No     Fall Risk Screen:    NAMRATA Fall Risk Assessment was completed, and patient is at LOW risk for falls.Assessment completed on:3/1/2024    Depression Screening:      3/1/2024     2:44 PM   PHQ-2/PHQ-9 Depression Screening   Little Interest or Pleasure in Doing Things 1-->several days   Feeling Down, Depressed or Hopeless 1-->several days   Trouble Falling or Staying Asleep, or Sleeping Too Much 1-->several days   Feeling Tired or Having Little Energy 1-->several days   Poor Appetite or Overeating 0-->not at all   Feeling Bad about Yourself - or that You are a Failure or Have Let Yourself or Your Family Down 1-->several days   Trouble " Concentrating on Things, Such as Reading the Newspaper or Watching Television 0-->not at all   Moving or Speaking So Slowly that Other People Could Have Noticed? Or the Opposite - Being So Fidgety 0-->not at all   Thoughts that You Would be Better Off Dead or of Hurting Yourself in Some Way 0-->not at all   PHQ-9: Brief Depression Severity Measure Score 5   If You Checked Off Any Problems, How Difficult Have These Problems Made It For You to Do Your Work, Take Care of Things at Home, or Get Along with Other People? not difficult at all       Health Habits and Functional and Cognitive Screening:      3/1/2024     2:41 PM   Functional & Cognitive Status   Do you have difficulty preparing food and eating? No   Do you have difficulty bathing yourself, getting dressed or grooming yourself? No   Do you have difficulty using the toilet? No   Do you have difficulty moving around from place to place? Yes   Do you have trouble with steps or getting out of a bed or a chair? Yes   Current Diet Well Balanced Diet   Dental Exam Up to date   Eye Exam Up to date   Exercise (times per week) 0 times per week   Current Exercises Include No Regular Exercise;Gardening   Do you need help using the phone?  No   Are you deaf or do you have serious difficulty hearing?  Yes   Do you need help to go to places out of walking distance? No   Do you need help shopping? No   Do you need help preparing meals?  No   Do you need help with housework?  No   Do you need help with laundry? No   Do you need help taking your medications? No   Do you need help managing money? No   Do you ever drive or ride in a car without wearing a seat belt? No   Have you felt unusual stress, anger or loneliness in the last month? Yes   Who do you live with? Spouse   If you need help, do you have trouble finding someone available to you? No   Do you have difficulty concentrating, remembering or making decisions? No       Age-appropriate Screening Schedule:  Refer to the  list below for future screening recommendations based on patient's age, sex and/or medical conditions. Orders for these recommended tests are listed in the plan section. The patient has been provided with a written plan.    Health Maintenance   Topic Date Due    BMI FOLLOWUP  02/28/2024    ZOSTER VACCINE (1 of 2) 03/01/2024 (Originally 4/15/1996)    RSV Vaccine - Adults (1 - 1-dose 60+ series) 03/01/2025 (Originally 4/15/2006)    MAMMOGRAM  09/01/2024    LIPID PANEL  02/23/2025    ANNUAL WELLNESS VISIT  03/01/2025    COLORECTAL CANCER SCREENING  03/29/2025    DXA SCAN  09/01/2025    TDAP/TD VACCINES (2 - Td or Tdap) 02/23/2026    HEPATITIS C SCREENING  Completed    COVID-19 Vaccine  Completed    INFLUENZA VACCINE  Completed    Pneumococcal Vaccine 65+  Completed                  CMS Preventative Services Quick Reference  Risk Factors Identified During Encounter  Chronic Pain: Natural history and expected course discussed. Questions answered.  Immunizations Discussed/Encouraged: Shingrix and RSV (Respiratory Syncytial Virus)  Inactivity/Sedentary: Patient was advised to exercise at least 150 minutes a week per CDC recommendations.  Polypharmacy: Medication List reviewed and Medication changes were made  The above risks/problems have been discussed with the patient.  Pertinent information has been shared with the patient in the After Visit Summary.  An After Visit Summary and PPPS were made available to the patient.    Follow Up:   Next Medicare Wellness visit to be scheduled in 1 year.       Additional E&M Note during same encounter follows:  Patient has multiple medical problems which are significant and separately identifiable that require additional work above and beyond the Medicare Wellness Visit.      Chief Complaint  Medicare Wellness-subsequent    Subjective        HPI  Carla Serrano is also being seen today for     Pt has had more stress.  She handles the day ok but doesn't always sleep well.  She would  "like something to help her sleep.   No help with melatonin.  her 12 yo great niece passed away last week from cancer.  Her 20 granddaughter was just diagnosed with autism and is having severe depression.        Chronic CAD - pt has followed with cardiology.  She has chronic LE edema.  She does think it is worse if she eats more salt.  She has compression socks but doesn't wear them.        Mixed stress and urge incontinence - she tried oxybutinin for about a week.  She stopped it bc of worsening constipation.      Hypothyroidism - no hair/skin changes.      HLD - on statin.  No cramps or myalgias.     Review of Systems   Constitutional: Negative.    HENT: Negative.     Eyes: Negative.    Respiratory: Negative.     Cardiovascular: Negative.    Gastrointestinal: Negative.    Endocrine: Negative.    Musculoskeletal: Negative.    Skin: Negative.    Allergic/Immunologic: Negative.    Neurological: Negative.    Hematological: Negative.    Psychiatric/Behavioral: Negative.     All other systems reviewed and are negative.      Objective   Vital Signs:  /76 (BP Location: Left arm, Patient Position: Sitting, Cuff Size: Large Adult)   Pulse 75   Temp 97.5 °F (36.4 °C) (Infrared)   Ht 157.5 cm (62.01\")   Wt 83.4 kg (183 lb 14.4 oz)   SpO2 94%   BMI 33.63 kg/m²     Physical Exam  Vitals and nursing note reviewed.   Constitutional:       General: She is not in acute distress.     Appearance: She is well-developed.   HENT:      Head: Normocephalic and atraumatic.      Right Ear: External ear normal.      Left Ear: External ear normal.      Nose: Nose normal.   Eyes:      Conjunctiva/sclera: Conjunctivae normal.      Pupils: Pupils are equal, round, and reactive to light.   Cardiovascular:      Rate and Rhythm: Normal rate and regular rhythm.      Heart sounds: Normal heart sounds.   Pulmonary:      Effort: Pulmonary effort is normal. No respiratory distress.      Breath sounds: Normal breath sounds. No wheezing. "   Musculoskeletal:         General: Normal range of motion.      Cervical back: Normal range of motion and neck supple.      Right lower leg: Edema present.      Left lower leg: Edema present.      Comments: Normal gait   Skin:     General: Skin is warm and dry.   Neurological:      General: No focal deficit present.      Mental Status: She is alert and oriented to person, place, and time.   Psychiatric:         Mood and Affect: Mood normal.         Behavior: Behavior normal.         Thought Content: Thought content normal.         Judgment: Judgment normal.          The following data was reviewed by: Hilda Loera MD on 03/01/2024:  Common labs          8/22/2023    08:30 2/23/2024    09:30   Common Labs   Glucose 91  88    BUN 13  17    Creatinine 0.76  0.79    Sodium 143  142    Potassium 3.7  3.6    Chloride 105  105    Calcium 9.7  9.7    Total Protein 6.1  6.3    Albumin 4.1  4.3    Total Bilirubin 0.6  0.6    Alkaline Phosphatase 56  54    AST (SGOT) 20  18    ALT (SGPT) 17  19    WBC 3.43  3.63    Hemoglobin 12.6  12.8    Hematocrit 35.5  37.4    Platelets 209  220    Total Cholesterol 174  184    Triglycerides 99  105    HDL Cholesterol 63  66    LDL Cholesterol  93  99    Hemoglobin A1C  4.90                 Assessment and Plan   Diagnoses and all orders for this visit:    1. Medicare annual wellness visit, subsequent (Primary)    2. Sleep difficulties  -     traZODone (DESYREL) 50 MG tablet; Take 0.5 tablets by mouth Every Night.  Dispense: 15 tablet; Refill: 0    3. Coronary artery disease of bypass graft of native heart with stable angina pectoris    4. Primary hypertension  -     CBC & Differential; Future  -     Comprehensive Metabolic Panel; Future  -     Hemoglobin A1c; Future  -     Lipid Panel With LDL / HDL Ratio; Future  -     T4, Free; Future  -     TSH; Future    5. Mixed hyperlipidemia  -     Comprehensive Metabolic Panel; Future  -     Lipid Panel With LDL / HDL Ratio; Future    6.  Acquired hypothyroidism  -     CBC & Differential; Future  -     Comprehensive Metabolic Panel; Future  -     Hemoglobin A1c; Future  -     Lipid Panel With LDL / HDL Ratio; Future  -     T4, Free; Future  -     TSH; Future    7. Prediabetes  -     CBC & Differential; Future  -     Comprehensive Metabolic Panel; Future  -     Hemoglobin A1c; Future  -     Lipid Panel With LDL / HDL Ratio; Future  -     T4, Free; Future  -     TSH; Future    8. Incontinence in female  -     solifenacin (VESIcare) 10 MG tablet; Take 1 tablet by mouth Daily.  Dispense: 30 tablet; Refill: 0    9. Lymphedema  -     Ambulatory Referral to Wound Clinic             Follow Up   Return in about 6 months (around 9/1/2024) for Recheck, labs.  Patient was given instructions and counseling regarding her condition or for health maintenance advice. Please see specific information pulled into the AVS if appropriate.

## 2024-03-26 DIAGNOSIS — I10 ESSENTIAL HYPERTENSION: ICD-10-CM

## 2024-03-26 RX ORDER — LOSARTAN POTASSIUM AND HYDROCHLOROTHIAZIDE 25; 100 MG/1; MG/1
1 TABLET ORAL DAILY
Qty: 90 TABLET | Refills: 0 | Status: SHIPPED | OUTPATIENT
Start: 2024-03-26

## 2024-03-29 DIAGNOSIS — G47.9 SLEEP DIFFICULTIES: ICD-10-CM

## 2024-03-29 DIAGNOSIS — R32 INCONTINENCE IN FEMALE: ICD-10-CM

## 2024-03-29 RX ORDER — TRAZODONE HYDROCHLORIDE 50 MG/1
TABLET ORAL
Qty: 15 TABLET | Refills: 0 | Status: SHIPPED | OUTPATIENT
Start: 2024-03-29

## 2024-03-29 RX ORDER — SOLIFENACIN SUCCINATE 10 MG/1
10 TABLET, FILM COATED ORAL DAILY
Qty: 30 TABLET | Refills: 0 | Status: SHIPPED | OUTPATIENT
Start: 2024-03-29

## 2024-04-03 DIAGNOSIS — I10 ESSENTIAL HYPERTENSION: ICD-10-CM

## 2024-04-03 RX ORDER — LOSARTAN POTASSIUM AND HYDROCHLOROTHIAZIDE 25; 100 MG/1; MG/1
1 TABLET ORAL DAILY
Qty: 90 TABLET | Refills: 0 | OUTPATIENT
Start: 2024-04-03

## 2024-04-16 RX ORDER — CARVEDILOL 6.25 MG/1
6.25 TABLET ORAL 2 TIMES DAILY WITH MEALS
Qty: 180 TABLET | Refills: 1 | Status: SHIPPED | OUTPATIENT
Start: 2024-04-16

## 2024-04-16 NOTE — TELEPHONE ENCOUNTER
From other provider    Rx Refill Note  Requested Prescriptions     Pending Prescriptions Disp Refills    carvedilol (COREG) 6.25 MG tablet 360 tablet 0      Last office visit with prescribing clinician: 3/1/2024   Last telemedicine visit with prescribing clinician: Visit date not found   Next office visit with prescribing clinician: 8/30/2024                         Would you like a call back once the refill request has been completed: [] Yes [] No    If the office needs to give you a call back, can they leave a voicemail: [] Yes [] No    Maki Peralta MA  04/16/24, 14:10 EDT

## 2024-04-16 NOTE — TELEPHONE ENCOUNTER
"    Caller: Carla Serrano \"Carla Serrano\"    Relationship: Self    Best call back number: [phone]    Requested Prescriptions:   Requested Prescriptions     Pending Prescriptions Disp Refills    carvedilol (COREG) 6.25 MG tablet 360 tablet 0        Pharmacy where request should be sent: [facility] [id number] [place], [place] - [address] - [phone] PH - [phone] FX     Last office visit with prescribing clinician: 3/1/2024   Last telemedicine visit with prescribing clinician: Visit date not found   Next office visit with prescribing clinician: 8/30/2024     Does the patient have less than a 3 day supply:  [x] Yes  [] No    Would you like a call back once the refill request has been completed: [] Yes [x] No    If the office needs to give you a call back, can they leave a voicemail: [] Yes [x] No    Aylin Hadley Rep   04/16/24 11:47 EDT         "

## 2024-04-30 DIAGNOSIS — G47.9 SLEEP DIFFICULTIES: ICD-10-CM

## 2024-04-30 RX ORDER — TRAZODONE HYDROCHLORIDE 50 MG/1
TABLET ORAL
Qty: 15 TABLET | Refills: 0 | Status: SHIPPED | OUTPATIENT
Start: 2024-04-30

## 2024-05-10 RX ORDER — ATORVASTATIN CALCIUM 40 MG/1
TABLET, FILM COATED ORAL
Qty: 45 TABLET | Refills: 3 | Status: SHIPPED | OUTPATIENT
Start: 2024-05-10

## 2024-05-16 ENCOUNTER — TELEPHONE (OUTPATIENT)
Dept: INTERNAL MEDICINE | Facility: CLINIC | Age: 79
End: 2024-05-16
Payer: MEDICARE

## 2024-05-16 NOTE — TELEPHONE ENCOUNTER
"  Caller: Carla Serrano \"Carla Serrano\"    Relationship: Self    Best call back number:     Which medication are you concerned about: CARVEDILOL 6.25MG    Who prescribed you this medication:     When did you start taking this medication: YEARS AGO    What are your concerns: PATIENT SAYS THAT THE DIRECTIONS ON HER BOTTLE OF CARVEDILOL IS DIFFERENT FROM THE PREVIOUS DIRECTIONS AND SHE WANTS TO BE CALLED TO DISCUSS THIS. SHE SAYS SHE HAS BEEN TAKING THE OLD DOSAGE AND NOT THE NEW DOSAGE THAT'S ON HER BOTTLE    How long have you had these concerns:               "

## 2024-05-16 NOTE — TELEPHONE ENCOUNTER
It looks like previous directions were take 2 tablets by mouth twice daily. Directions that were sent in were take 1 tablet by mouth twice daily.    What directions is patient supposed to be taking?

## 2024-05-17 RX ORDER — CARVEDILOL 6.25 MG/1
12.5 TABLET ORAL 2 TIMES DAILY WITH MEALS
Qty: 360 TABLET | Refills: 1 | Status: SHIPPED | OUTPATIENT
Start: 2024-05-17

## 2024-05-19 NOTE — TELEPHONE ENCOUNTER
Tried to call patient to inform her of this but was unable to reach and left a voicemail with the details.

## 2024-05-28 DIAGNOSIS — G47.9 SLEEP DIFFICULTIES: ICD-10-CM

## 2024-05-28 RX ORDER — TRAZODONE HYDROCHLORIDE 50 MG/1
TABLET ORAL
Qty: 15 TABLET | Refills: 0 | Status: SHIPPED | OUTPATIENT
Start: 2024-05-28

## 2024-05-28 NOTE — TELEPHONE ENCOUNTER
Rx Refill Note  Requested Prescriptions     Pending Prescriptions Disp Refills    traZODone (DESYREL) 50 MG tablet [Pharmacy Med Name: TRAZODONE 50 MG TABLET] 15 tablet 0     Sig: TAKE 1/2 TABLET BY MOUTH ONCE NIGHTLY      Last office visit with prescribing clinician: 3/1/2024   Last telemedicine visit with prescribing clinician: Visit date not found   Next office visit with prescribing clinician: 8/30/2024                         Would you like a call back once the refill request has been completed: [] Yes [] No    If the office needs to give you a call back, can they leave a voicemail: [] Yes [] No    Zee Borges MA  05/28/24, 13:23 EDT

## 2024-06-06 ENCOUNTER — OFFICE VISIT (OUTPATIENT)
Dept: CARDIOLOGY | Facility: CLINIC | Age: 79
End: 2024-06-06
Payer: MEDICARE

## 2024-06-06 VITALS
BODY MASS INDEX: 33.8 KG/M2 | SYSTOLIC BLOOD PRESSURE: 138 MMHG | DIASTOLIC BLOOD PRESSURE: 80 MMHG | WEIGHT: 183.7 LBS | HEART RATE: 68 BPM | HEIGHT: 62 IN

## 2024-06-06 DIAGNOSIS — G47.33 OSA (OBSTRUCTIVE SLEEP APNEA): ICD-10-CM

## 2024-06-06 DIAGNOSIS — E78.2 MIXED HYPERLIPIDEMIA: ICD-10-CM

## 2024-06-06 DIAGNOSIS — I25.810 CORONARY ARTERY DISEASE INVOLVING CORONARY BYPASS GRAFT OF NATIVE HEART WITHOUT ANGINA PECTORIS: Primary | ICD-10-CM

## 2024-06-06 PROCEDURE — 3079F DIAST BP 80-89 MM HG: CPT | Performed by: INTERNAL MEDICINE

## 2024-06-06 PROCEDURE — 93000 ELECTROCARDIOGRAM COMPLETE: CPT | Performed by: INTERNAL MEDICINE

## 2024-06-06 PROCEDURE — 3075F SYST BP GE 130 - 139MM HG: CPT | Performed by: INTERNAL MEDICINE

## 2024-06-06 PROCEDURE — 99214 OFFICE O/P EST MOD 30 MIN: CPT | Performed by: INTERNAL MEDICINE

## 2024-06-06 NOTE — PROGRESS NOTES
PATIENTINFORMATION    Date of Office Visit: 2024  Encounter Provider: Antony Galvin MD  Place of Service: [facility] CARDIOLOGY  Patient Name: Carla Serrano  : 1945    Subjective:     Encounter Date:2024      Patient ID: Carla Serrano is a 79 y.o. female.    Chief Complaint   Patient presents with    Follow-up    Coronary artery disease of bypass graft of native heart wit       HPI  Ms. Serrano is a pleasant 79 years old lady who came to cardiology clinic for follow-up visit.  She is compliant with all current medications without significant side effects and denies any ER visit or hospitalization since last clinic visit.  She denies any rest or exertional chest pain, shortness of breath, orthopnea, PND, palpitations, presyncope syncope or extremity swelling.   She is fairly active despite joint pains and denies exertional symptoms.  She is sleeping better with improved depression symptoms after she was started on trazodone.    ROS  All systems reviewed and negative except as noted in HPI.    Past Medical History:   Diagnosis Date    Allergic rhinitis     Anemia     Arthritis     CAD (coronary artery disease)     h/o CABG x3    Cardiomyopathy     Degenerative joint disease of knee, left     sched replacement    Edema     GERD (gastroesophageal reflux disease)     Heart disease     Hiatal hernia     Hyperlipidemia     Hypertension     Hypothyroidism     Incontinence in female 8/10/2017    JOVANNA (obstructive sleep apnea)     no machine, uses nasal strips    Osteoporosis     Prediabetes     SVT (supraventricular tachycardia)     Urinary, incontinence, stress female        Past Surgical History:   Procedure Laterality Date    BREAST BIOPSY Left     CORONARY ARTERY BYPASS GRAFT      CORONARY ARTERY BYPASS GRAFT      triple bypass @ [facility]    HYSTERECTOMY      KNEE SURGERY Left      cartilage removal    CT ARTHRP KNE CONDYLE&PLATU MEDIAL&LAT COMPARTMENTS  "Left 3/29/2016    Procedure: LEFT TOTAL KNEE ARTHROPLASTY placement of hemovac drain;  Surgeon: Tj Barraza MD;  Location: [facility];  Service: Orthopedics       Social History     Socioeconomic History    Marital status:    Tobacco Use    Smoking status: Never    Smokeless tobacco: Never   Vaping Use    Vaping status: Never Used   Substance and Sexual Activity    Alcohol use: No    Drug use: No    Sexual activity: Defer       Family History   Problem Relation Age of Onset    Heart disease Mother 50    Heart attack Father 50    Breast cancer Sister     Arrhythmia Sister     Coronary artery disease Other            ECG 12 Lead    Date/Time: 6/6/2024 3:55 PM  Performed by: Antony Galvin MD    Authorized by: Antony Galvin MD  Comparison: compared with previous ECG from 4/27/2023  Similar to previous ECG  Rhythm: sinus rhythm  Rate: normal  Conduction: conduction normal  ST Segments: ST segments normal  T Waves: T waves normal  QRS axis: normal  Other: no other findings    Clinical impression: normal ECG           Objective:     /80 (BP Location: Left arm)   Pulse 68   Ht 157.5 cm (62\")   Wt 83.3 kg (183 lb 11.2 oz)   BMI 33.60 kg/m²  Body mass index is 33.6 kg/m².     Constitutional:       General: Not in acute distress.     Appearance: Well-developed. Not diaphoretic.   Eyes:      Pupils: Pupils are equal, round, and reactive to light.   HENT:      Head: Normocephalic and atraumatic.   Neck:      Thyroid: No thyromegaly.   Pulmonary:      Effort: Pulmonary effort is normal. No respiratory distress.      Breath sounds: Normal breath sounds. No wheezing. No rales.   Chest:      Chest wall: Not tender to palpatation.   Cardiovascular:      Normal rate. Regular rhythm.      No gallop.    Pulses:     Intact distal pulses.   Edema:     Peripheral edema absent.   Abdominal:      General: Bowel sounds are normal. There is no distension.      Palpations: Abdomen is soft.      Tenderness: " There is no guarding.   Musculoskeletal: Normal range of motion.         General: No deformity.      Cervical back: Normal range of motion and neck supple. Skin:     General: Skin is warm and dry.      Findings: No rash.   Neurological:      Mental Status: Alert and oriented to person, place, and time.      Cranial Nerves: No cranial nerve deficit.      Deep Tendon Reflexes: Reflexes are normal and symmetric.   Psychiatric:         Judgment: Judgment normal.         Review Of Data: I have reviewed pertinent recent labs, images and documents and pertinent findings included in HPI or assessment below.    Lipid Panel          8/22/2023    08:30 2/23/2024    09:30   Lipid Panel   Total Cholesterol 174  184    Triglycerides 99  105    HDL Cholesterol 63  66    VLDL Cholesterol 18  19    LDL Cholesterol  93  99    LDL/HDL Ratio 1.45  1.47          Assessment/Plan:       Hx of CAD s/p CABGX3 in 2011 with LIMA to LAD, SVG to OM and SVG to PDA. Myocardial perfusion study on 3/24/2022 showed small inferolateral scar with mild cora-infarct ischemia. Prior echocardiogram shows preserved left ventricular ejection fraction.Hx of intolerance to Plavix started during prior visits.  Currently on aspirin and a statin.  Hypercholesterolemia-on high intensity statin-lipid panel near goal  Hypertension-on Coreg and losartan/hydrochlorthiazide-controlled  History of paroxysmal SVT in the perioop periord in 2011-remains in sinus rhythm  Obstructive sleep apnea but could not tolerate CPAP   Chronic degenerative joint disease-left knee replaced.    Bilateral lower extremity pitting edema -about the same and her weight same also.  Stress/depression/sleep problems-significantly improved with trazodone    Mrs. Lilly is doing fairly well from cardiac standpoint.  Continue current care  I have encouraged her to start walking regularly.   Follow-up in 6 months.    Diagnosis and plan of care discussed with patient and verbalized  understanding.            Your medication list            Accurate as of June 6, 2024  4:42 PM. If you have any questions, ask your nurse or doctor.                CONTINUE taking these medications        Instructions Last Dose Given Next Dose Due   acetaminophen 650 MG 8 hr tablet  Commonly known as: TYLENOL      Take 1 tablet by mouth Every 8 (Eight) Hours As Needed.       amoxicillin 500 MG capsule  Commonly known as: AMOXIL      Take 1 capsule by mouth As Needed (use before Dentist).       aspirin 81 MG EC tablet      Take 1 tablet by mouth Daily.       atorvastatin 40 MG tablet  Commonly known as: LIPITOR      TAKE 1/2 TABLET BY MOUTH DAILY       carvedilol 6.25 MG tablet  Commonly known as: COREG      Take 2 tablets by mouth 2 (Two) Times a Day With Meals.       CO ENZYME Q-10 PO      Take 100 mg by mouth 2 (two) times a day.       Echinacea Goldenseal Plus capsule      Take 900 mg by mouth every morning.       fexofenadine 60 MG tablet  Commonly known as: ALLEGRA      Take 1 tablet by mouth Daily.       Flaxseed Oil 1200 MG capsule      Take 2 capsules by mouth daily.       Ginkgo Biloba Extract 60 MG capsule      Take 1 tablet by mouth every morning.       Glucosamine Chondr 1500 Complx capsule      Take 2 capsules by mouth every morning.       isosorbide mononitrate 30 MG 24 hr tablet  Commonly known as: IMDUR      TAKE ONE TABLET BY MOUTH DAILY       levothyroxine 50 MCG tablet  Commonly known as: SYNTHROID, LEVOTHROID      TAKE ONE TABLET BY MOUTH EVERY MORNING       losartan-hydrochlorothiazide 100-25 MG per tablet  Commonly known as: HYZAAR      TAKE 1 TABLET BY MOUTH DAILY       MULTIVITAMINS PO      Take 1 tablet by mouth daily.       pantoprazole 40 MG EC tablet  Commonly known as: PROTONIX      TAKE ONE TABLET BY MOUTH DAILY       Potassium Gluconate 595 MG capsule      Take 595 mcg/day by mouth every morning.       ALLY-e 200 MG capsule      Take 200 mg by mouth 4 (Four) Times a Day.        solifenacin 10 MG tablet  Commonly known as: VESICARE      TAKE 1 TABLET BY MOUTH DAILY       traZODone 50 MG tablet  Commonly known as: DESYREL      TAKE 1/2 TABLET BY MOUTH ONCE NIGHTLY       VITAMIN B COMPLEX IJ      Take 1 tablet by mouth 2 (two) times a day.       Vitamin D-3 25 MCG (1000 UT) capsule      Take 1,000 Units by mouth Every Night.                    Antony Galvin MD  06/06/24  16:42 EDT

## 2024-06-23 DIAGNOSIS — I10 ESSENTIAL HYPERTENSION: ICD-10-CM

## 2024-06-24 RX ORDER — LOSARTAN POTASSIUM AND HYDROCHLOROTHIAZIDE 25; 100 MG/1; MG/1
1 TABLET ORAL DAILY
Qty: 90 TABLET | Refills: 0 | Status: SHIPPED | OUTPATIENT
Start: 2024-06-24

## 2024-06-25 DIAGNOSIS — G47.9 SLEEP DIFFICULTIES: ICD-10-CM

## 2024-06-25 RX ORDER — TRAZODONE HYDROCHLORIDE 50 MG/1
TABLET ORAL
Qty: 15 TABLET | Refills: 0 | Status: SHIPPED | OUTPATIENT
Start: 2024-06-25

## 2024-06-25 NOTE — TELEPHONE ENCOUNTER
Rx Refill Note  Requested Prescriptions     Pending Prescriptions Disp Refills    traZODone (DESYREL) 50 MG tablet [Pharmacy Med Name: TRAZODONE 50 MG TABLET] 15 tablet 0     Sig: TAKE 1/2 TABLET BY MOUTH ONCE NIGHTLY      Last office visit with prescribing clinician: 3/1/2024   Last telemedicine visit with prescribing clinician: Visit date not found   Next office visit with prescribing clinician: 8/30/2024                         Would you like a call back once the refill request has been completed: [] Yes [] No    If the office needs to give you a call back, can they leave a voicemail: [] Yes [] No    Maki Peralta MA  06/25/24, 10:41 EDT

## 2024-07-24 DIAGNOSIS — G47.9 SLEEP DIFFICULTIES: ICD-10-CM

## 2024-07-26 DIAGNOSIS — E03.9 ACQUIRED HYPOTHYROIDISM: ICD-10-CM

## 2024-07-30 DIAGNOSIS — G47.9 SLEEP DIFFICULTIES: ICD-10-CM

## 2024-07-30 RX ORDER — TRAZODONE HYDROCHLORIDE 50 MG/1
TABLET ORAL
Qty: 15 TABLET | Refills: 0 | OUTPATIENT
Start: 2024-07-30

## 2024-07-30 NOTE — TELEPHONE ENCOUNTER
Rx Refill Note  Requested Prescriptions     Pending Prescriptions Disp Refills    traZODone (DESYREL) 50 MG tablet 15 tablet 0      Last office visit with prescribing clinician: 3/1/2024   Last telemedicine visit with prescribing clinician: Visit date not found   Next office visit with prescribing clinician: 8/30/2024                         Would you like a call back once the refill request has been completed: [] Yes [] No    If the office needs to give you a call back, can they leave a voicemail: [] Yes [] No    Zee Borges MA  07/30/24, 18:53 EDT

## 2024-07-31 RX ORDER — TRAZODONE HYDROCHLORIDE 50 MG/1
25 TABLET ORAL NIGHTLY PRN
Qty: 15 TABLET | Refills: 0 | Status: SHIPPED | OUTPATIENT
Start: 2024-07-31

## 2024-07-31 RX ORDER — LEVOTHYROXINE SODIUM 0.05 MG/1
50 TABLET ORAL EVERY MORNING
Qty: 90 TABLET | Refills: 1 | Status: SHIPPED | OUTPATIENT
Start: 2024-07-31

## 2024-07-31 NOTE — TELEPHONE ENCOUNTER
Rx Refill Note  Requested Prescriptions     Pending Prescriptions Disp Refills    levothyroxine (SYNTHROID, LEVOTHROID) 50 MCG tablet [Pharmacy Med Name: LEVOTHYROXINE 50 MCG TABLET] 90 tablet 1     Sig: TAKE 1 TABLET BY MOUTH EVERY MORNING      Last office visit with prescribing clinician: 3/1/2024   Last telemedicine visit with prescribing clinician: Visit date not found   Next office visit with prescribing clinician: 7/30/2024                         Would you like a call back once the refill request has been completed: [] Yes [] No    If the office needs to give you a call back, can they leave a voicemail: [] Yes [] No    Maki Peralta MA  07/31/24, 14:36 EDT

## 2024-08-14 DIAGNOSIS — E78.2 MIXED HYPERLIPIDEMIA: ICD-10-CM

## 2024-08-14 DIAGNOSIS — R73.03 PREDIABETES: ICD-10-CM

## 2024-08-14 DIAGNOSIS — I10 PRIMARY HYPERTENSION: ICD-10-CM

## 2024-08-14 DIAGNOSIS — E03.9 ACQUIRED HYPOTHYROIDISM: ICD-10-CM

## 2024-08-23 LAB
ALBUMIN SERPL-MCNC: 4.1 G/DL (ref 3.5–5.2)
ALBUMIN/GLOB SERPL: 2 G/DL
ALP SERPL-CCNC: 56 U/L (ref 39–117)
ALT SERPL-CCNC: 16 U/L (ref 1–33)
AST SERPL-CCNC: 14 U/L (ref 1–32)
BASOPHILS # BLD AUTO: 0.02 10*3/MM3 (ref 0–0.2)
BASOPHILS NFR BLD AUTO: 0.6 % (ref 0–1.5)
BILIRUB SERPL-MCNC: 0.4 MG/DL (ref 0–1.2)
BUN SERPL-MCNC: 17 MG/DL (ref 8–23)
BUN/CREAT SERPL: 21 (ref 7–25)
CALCIUM SERPL-MCNC: 9.5 MG/DL (ref 8.6–10.5)
CHLORIDE SERPL-SCNC: 103 MMOL/L (ref 98–107)
CHOLEST SERPL-MCNC: 174 MG/DL (ref 0–200)
CO2 SERPL-SCNC: 28.4 MMOL/L (ref 22–29)
CREAT SERPL-MCNC: 0.81 MG/DL (ref 0.57–1)
EGFRCR SERPLBLD CKD-EPI 2021: 74.4 ML/MIN/1.73
EOSINOPHIL # BLD AUTO: 0.1 10*3/MM3 (ref 0–0.4)
EOSINOPHIL NFR BLD AUTO: 2.9 % (ref 0.3–6.2)
ERYTHROCYTE [DISTWIDTH] IN BLOOD BY AUTOMATED COUNT: 12 % (ref 12.3–15.4)
GLOBULIN SER CALC-MCNC: 2.1 GM/DL
GLUCOSE SERPL-MCNC: 88 MG/DL (ref 65–99)
HBA1C MFR BLD: 5.3 % (ref 4.8–5.6)
HCT VFR BLD AUTO: 35.4 % (ref 34–46.6)
HDLC SERPL-MCNC: 63 MG/DL (ref 40–60)
HGB BLD-MCNC: 12.2 G/DL (ref 12–15.9)
IMM GRANULOCYTES # BLD AUTO: 0 10*3/MM3 (ref 0–0.05)
IMM GRANULOCYTES NFR BLD AUTO: 0 % (ref 0–0.5)
LDLC SERPL CALC-MCNC: 93 MG/DL (ref 0–100)
LDLC/HDLC SERPL: 1.45 {RATIO}
LYMPHOCYTES # BLD AUTO: 1.11 10*3/MM3 (ref 0.7–3.1)
LYMPHOCYTES NFR BLD AUTO: 32.2 % (ref 19.6–45.3)
MCH RBC QN AUTO: 34.5 PG (ref 26.6–33)
MCHC RBC AUTO-ENTMCNC: 34.5 G/DL (ref 31.5–35.7)
MCV RBC AUTO: 100 FL (ref 79–97)
MONOCYTES # BLD AUTO: 0.34 10*3/MM3 (ref 0.1–0.9)
MONOCYTES NFR BLD AUTO: 9.9 % (ref 5–12)
NEUTROPHILS # BLD AUTO: 1.88 10*3/MM3 (ref 1.7–7)
NEUTROPHILS NFR BLD AUTO: 54.4 % (ref 42.7–76)
NRBC BLD AUTO-RTO: 0 /100 WBC (ref 0–0.2)
PLATELET # BLD AUTO: 200 10*3/MM3 (ref 140–450)
POTASSIUM SERPL-SCNC: 3.6 MMOL/L (ref 3.5–5.2)
PROT SERPL-MCNC: 6.2 G/DL (ref 6–8.5)
RBC # BLD AUTO: 3.54 10*6/MM3 (ref 3.77–5.28)
SODIUM SERPL-SCNC: 141 MMOL/L (ref 136–145)
T4 FREE SERPL-MCNC: 1.29 NG/DL (ref 0.92–1.68)
TRIGL SERPL-MCNC: 99 MG/DL (ref 0–150)
TSH SERPL DL<=0.005 MIU/L-ACNC: 1.3 UIU/ML (ref 0.27–4.2)
VLDLC SERPL CALC-MCNC: 18 MG/DL (ref 5–40)
WBC # BLD AUTO: 3.45 10*3/MM3 (ref 3.4–10.8)

## 2024-08-28 DIAGNOSIS — G47.9 SLEEP DIFFICULTIES: ICD-10-CM

## 2024-08-29 RX ORDER — TRAZODONE HYDROCHLORIDE 50 MG/1
TABLET, FILM COATED ORAL
Qty: 15 TABLET | Refills: 0 | Status: SHIPPED | OUTPATIENT
Start: 2024-08-29 | End: 2024-08-30 | Stop reason: SDUPTHER

## 2024-08-30 ENCOUNTER — OFFICE VISIT (OUTPATIENT)
Dept: INTERNAL MEDICINE | Facility: CLINIC | Age: 79
End: 2024-08-30
Payer: MEDICARE

## 2024-08-30 VITALS
TEMPERATURE: 98 F | SYSTOLIC BLOOD PRESSURE: 100 MMHG | HEART RATE: 64 BPM | WEIGHT: 183 LBS | BODY MASS INDEX: 33.47 KG/M2 | OXYGEN SATURATION: 92 % | DIASTOLIC BLOOD PRESSURE: 70 MMHG

## 2024-08-30 DIAGNOSIS — G47.9 SLEEP DIFFICULTIES: ICD-10-CM

## 2024-08-30 PROCEDURE — 1126F AMNT PAIN NOTED NONE PRSNT: CPT | Performed by: INTERNAL MEDICINE

## 2024-08-30 PROCEDURE — 1159F MED LIST DOCD IN RCRD: CPT | Performed by: INTERNAL MEDICINE

## 2024-08-30 PROCEDURE — G2211 COMPLEX E/M VISIT ADD ON: HCPCS | Performed by: INTERNAL MEDICINE

## 2024-08-30 PROCEDURE — 1160F RVW MEDS BY RX/DR IN RCRD: CPT | Performed by: INTERNAL MEDICINE

## 2024-08-30 PROCEDURE — 3074F SYST BP LT 130 MM HG: CPT | Performed by: INTERNAL MEDICINE

## 2024-08-30 PROCEDURE — 3078F DIAST BP <80 MM HG: CPT | Performed by: INTERNAL MEDICINE

## 2024-08-30 PROCEDURE — 99214 OFFICE O/P EST MOD 30 MIN: CPT | Performed by: INTERNAL MEDICINE

## 2024-08-30 RX ORDER — TRAZODONE HYDROCHLORIDE 50 MG/1
50 TABLET, FILM COATED ORAL NIGHTLY
Qty: 90 TABLET | Refills: 1 | Status: SHIPPED | OUTPATIENT
Start: 2024-08-30

## 2024-08-30 NOTE — PROGRESS NOTES
Carla Serrano is a 79 y.o. female, who presents with a chief complaint of   Chief Complaint   Patient presents with   • Hypothyroidism     6/mo f/u with labs        {Problem List  Visit Diagnosis   Encounters  Notes  Medications  Labs  Result Review Imaging  Media :23}   HPI   Pt has had more stress.  She handles the day ok but doesn't always sleep well.  She would like something to help her sleep.   No help with melatonin.  her 12 yo great niece passed away last week from cancer.  Her 20 granddaughter was just diagnosed with autism and is having severe depression.         Chronic CAD - pt has followed with cardiology.  She has chronic LE edema.  She does think it is worse if she eats more salt.  She has compression socks but doesn't wear them.        Mixed stress and urge incontinence - she tried oxybutinin for about a week.  She stopped it bc of worsening constipation.      Hypothyroidism - no hair/skin changes.      HLD - on statin.  No cramps or myalgias.    The following portions of the patient's history were reviewed and updated as appropriate: allergies, current medications, past family history, past medical history, past social history, past surgical history and problem list.    Allergies: Latex    Review of Systems          Wt Readings from Last 3 Encounters:   08/30/24 83 kg (183 lb)   06/06/24 83.3 kg (183 lb 11.2 oz)   03/01/24 83.4 kg (183 lb 14.4 oz)     Temp Readings from Last 3 Encounters:   08/30/24 98 °F (36.7 °C) (Infrared)   03/01/24 97.5 °F (36.4 °C) (Infrared)   08/29/23 97.7 °F (36.5 °C) (Infrared)     BP Readings from Last 3 Encounters:   08/30/24 100/70   06/06/24 138/80   03/01/24 116/76     Pulse Readings from Last 3 Encounters:   08/30/24 64   06/06/24 68   03/01/24 75     Body mass index is 33.47 kg/m².  SpO2 Readings from Last 3 Encounters:   08/30/24 92%   03/01/24 94%   11/02/23 96%          Physical Exam    Results for orders placed or performed in visit on 08/14/24    TSH    Specimen: Blood   Result Value Ref Range    TSH 1.300 0.270 - 4.200 uIU/mL   T4, Free    Specimen: Blood   Result Value Ref Range    Free T4 1.29 0.92 - 1.68 ng/dL   Lipid Panel With LDL / HDL Ratio    Specimen: Blood   Result Value Ref Range    Total Cholesterol 174 0 - 200 mg/dL    Triglycerides 99 0 - 150 mg/dL    HDL Cholesterol 63 (H) 40 - 60 mg/dL    VLDL Cholesterol Jose Juan 18 5 - 40 mg/dL    LDL Chol Calc (NIH) 93 0 - 100 mg/dL    LDL/HDL RATIO 1.45    Hemoglobin A1c    Specimen: Blood   Result Value Ref Range    Hemoglobin A1C 5.30 4.80 - 5.60 %   Comprehensive Metabolic Panel    Specimen: Blood   Result Value Ref Range    Glucose 88 65 - 99 mg/dL    BUN 17 8 - 23 mg/dL    Creatinine 0.81 0.57 - 1.00 mg/dL    EGFR Result 74.4 >60.0 mL/min/1.73    BUN/Creatinine Ratio 21.0 7.0 - 25.0    Sodium 141 136 - 145 mmol/L    Potassium 3.6 3.5 - 5.2 mmol/L    Chloride 103 98 - 107 mmol/L    Total CO2 28.4 22.0 - 29.0 mmol/L    Calcium 9.5 8.6 - 10.5 mg/dL    Total Protein 6.2 6.0 - 8.5 g/dL    Albumin 4.1 3.5 - 5.2 g/dL    Globulin 2.1 gm/dL    A/G Ratio 2.0 g/dL    Total Bilirubin 0.4 0.0 - 1.2 mg/dL    Alkaline Phosphatase 56 39 - 117 U/L    AST (SGOT) 14 1 - 32 U/L    ALT (SGPT) 16 1 - 33 U/L   CBC & Differential    Specimen: Blood   Result Value Ref Range    WBC 3.45 3.40 - 10.80 10*3/mm3    RBC 3.54 (L) 3.77 - 5.28 10*6/mm3    Hemoglobin 12.2 12.0 - 15.9 g/dL    Hematocrit 35.4 34.0 - 46.6 %    .0 (H) 79.0 - 97.0 fL    MCH 34.5 (H) 26.6 - 33.0 pg    MCHC 34.5 31.5 - 35.7 g/dL    RDW 12.0 (L) 12.3 - 15.4 %    Platelets 200 140 - 450 10*3/mm3    Neutrophil Rel % 54.4 42.7 - 76.0 %    Lymphocyte Rel % 32.2 19.6 - 45.3 %    Monocyte Rel % 9.9 5.0 - 12.0 %    Eosinophil Rel % 2.9 0.3 - 6.2 %    Basophil Rel % 0.6 0.0 - 1.5 %    Neutrophils Absolute 1.88 1.70 - 7.00 10*3/mm3    Lymphocytes Absolute 1.11 0.70 - 3.10 10*3/mm3    Monocytes Absolute 0.34 0.10 - 0.90 10*3/mm3    Eosinophils Absolute 0.10 0.00 -  0.40 10*3/mm3    Basophils Absolute 0.02 0.00 - 0.20 10*3/mm3    Immature Granulocyte Rel % 0.0 0.0 - 0.5 %    Immature Grans Absolute 0.00 0.00 - 0.05 10*3/mm3    nRBC 0.0 0.0 - 0.2 /100 WBC     Result Review :{Labs  Result Review  Imaging  Med Tab  Media :23}   {The following data was reviewed by (Optional):85253}  {Ambulatory Labs (Optional):77084}  {Data reviewed (Optional):59819:::1}           Assessment and Plan {CC Problem List  Visit Diagnosis  ROS  Review (Popup)  [facility] Maintenance  Quality  BestPractice  Medications  SmartSets  SnapShot Encounters  Media :23}   There are no diagnoses linked to this encounter.             {Time Spent (Optional):25871}      Outpatient Medications Prior to Visit   Medication Sig Dispense Refill   • acetaminophen (TYLENOL) 650 MG 8 hr tablet Take 1 tablet by mouth Every 8 (Eight) Hours As Needed.     • aspirin (aspirin) 81 MG EC tablet Take 1 tablet by mouth Daily. 90 tablet 3   • atorvastatin (LIPITOR) 40 MG tablet TAKE 1/2 TABLET BY MOUTH DAILY 45 tablet 3   • B Complex Vitamins (VITAMIN B COMPLEX IJ) Take 1 tablet by mouth 2 (two) times a day.     • carvedilol (COREG) 6.25 MG tablet Take 2 tablets by mouth 2 (Two) Times a Day With Meals. 360 tablet 1   • Cholecalciferol (VITAMIN D-3) 1000 UNITS capsule Take 1,000 Units by mouth Every Night.     • CO ENZYME Q-10 PO Take 100 mg by mouth 2 (two) times a day.     • fexofenadine (ALLEGRA) 60 MG tablet Take 1 tablet by mouth Daily.     • Flaxseed, Linseed, (FLAXSEED OIL) 1200 MG capsule Take 2 capsules by mouth daily.     • Ginkgo Biloba Extract 60 MG capsule Take 1 tablet by mouth every morning.     • Glucosamine-Chondroit-Vit C-Mn (GLUCOSAMINE CHONDR 1500 COMPLX) capsule Take 2 capsules by mouth every morning.     • isosorbide mononitrate (IMDUR) 30 MG 24 hr tablet TAKE ONE TABLET BY MOUTH DAILY 90 tablet 3   • levothyroxine (SYNTHROID, LEVOTHROID) 50 MCG tablet TAKE 1 TABLET BY MOUTH EVERY MORNING 90 tablet 1    • losartan-hydrochlorothiazide (HYZAAR) 100-25 MG per tablet TAKE 1 TABLET BY MOUTH DAILY 90 tablet 0   • Misc Natural Products (ECHINACEA GOLDENSEAL PLUS) capsule Take 900 mg by mouth every morning.     • Multiple Vitamin (MULTIVITAMINS PO) Take 1 tablet by mouth daily.     • pantoprazole (PROTONIX) 40 MG EC tablet TAKE ONE TABLET BY MOUTH DAILY 90 tablet 3   • Potassium Gluconate 595 MG capsule Take 595 mcg/day by mouth every morning.     • S-Adenosylmethionine (ALLY-e) 200 MG capsule Take 200 mg by mouth 4 (Four) Times a Day.     • solifenacin (VESICARE) 10 MG tablet TAKE 1 TABLET BY MOUTH DAILY 30 tablet 0   • traZODone (DESYREL) 50 MG tablet TAKE 1/2 TABLET BY MOUTH ONCE NIGHTLY AS NEEDED FOR SLEEP 15 tablet 0   • amoxicillin (AMOXIL) 500 MG capsule Take 1 capsule by mouth As Needed (use before Dentist). (Patient not taking: Reported on 3/1/2024)       No facility-administered medications prior to visit.     No orders of the defined types were placed in this encounter.    [unfilled]  There are no discontinued medications.      No follow-ups on file.    Patient was given instructions and counseling regarding her condition or for health maintenance advice. Please see specific information pulled into the AVS if appropriate.

## 2024-08-30 NOTE — PROGRESS NOTES
Carla Serrano is a 79 y.o. female, who presents with a chief complaint of   Chief Complaint   Patient presents with    Hypothyroidism     6/mo f/u with labs           HPI   Pt here for f/u.       Pt has had lots of stress in life.  her 14 yo great niece passed away about 6 months ago from cancer.  Her 20 granddaughter was just diagnosed with autism and is having severe depression.   She handles the day ok but doesn't always sleep well.  She tried trazodone to help with sleep.  It helped at first but now isn't as effective.   No help with melatonin.  She is still having lots of fatigue in the afternoon.  She had attributed this to poor sleep but it has continued.        Chronic CAD - pt has followed with cardiology.  She has chronic LE edema.  She does think it is worse if she eats more salt and with the extreme heat outside.   She has compression socks but doesn't wear them regularly.        Mixed stress and urge incontinence - she tried oxybutinin for about a week.  She stopped it bc of worsening constipation.  Last time she was changed to vesicare but didn't try it.      HTN - she had a blood vessel that burst in her right eye.  No pain in eye.  Last eye exam about a year ago when she had cataracts removed.  Her bp's at home have been well controlled.       Hypothyroidism - no hair/skin changes.      HLD - on statin.  No cramps or myalgias.     The following portions of the patient's history were reviewed and updated as appropriate: allergies, current medications, past family history, past medical history, past social history, past surgical history and problem list.    Allergies: Latex    Review of Systems   Constitutional: Negative.    HENT: Negative.     Eyes: Negative.    Respiratory: Negative.     Cardiovascular: Negative.    Gastrointestinal: Negative.    Endocrine: Negative.    Genitourinary: Negative.    Musculoskeletal: Negative.    Skin: Negative.    Allergic/Immunologic: Negative.     Neurological: Negative.    Hematological: Negative.    Psychiatric/Behavioral: Negative.     All other systems reviewed and are negative.            Wt Readings from Last 3 Encounters:   08/30/24 83 kg (183 lb)   06/06/24 83.3 kg (183 lb 11.2 oz)   03/01/24 83.4 kg (183 lb 14.4 oz)     Temp Readings from Last 3 Encounters:   08/30/24 98 °F (36.7 °C) (Infrared)   03/01/24 97.5 °F (36.4 °C) (Infrared)   08/29/23 97.7 °F (36.5 °C) (Infrared)     BP Readings from Last 3 Encounters:   08/30/24 100/70   06/06/24 138/80   03/01/24 116/76     Pulse Readings from Last 3 Encounters:   08/30/24 64   06/06/24 68   03/01/24 75     Body mass index is 33.47 kg/m².  SpO2 Readings from Last 3 Encounters:   08/30/24 92%   03/01/24 94%   11/02/23 96%          Physical Exam  Vitals and nursing note reviewed.   Constitutional:       General: She is not in acute distress.     Appearance: She is well-developed.   HENT:      Head: Normocephalic and atraumatic.      Right Ear: External ear normal.      Left Ear: External ear normal.      Nose: Nose normal.   Eyes:      Conjunctiva/sclera: Conjunctivae normal.      Pupils: Pupils are equal, round, and reactive to light.   Cardiovascular:      Rate and Rhythm: Normal rate and regular rhythm.      Heart sounds: Normal heart sounds.   Pulmonary:      Effort: Pulmonary effort is normal. No respiratory distress.      Breath sounds: Normal breath sounds. No wheezing.   Musculoskeletal:         General: Normal range of motion.      Cervical back: Normal range of motion and neck supple.      Comments: Normal gait   Skin:     General: Skin is warm and dry.   Neurological:      Mental Status: She is alert and oriented to person, place, and time.   Psychiatric:         Behavior: Behavior normal.         Thought Content: Thought content normal.         Judgment: Judgment normal.         Results for orders placed or performed in visit on 08/14/24   TSH    Specimen: Blood   Result Value Ref Range    TSH  1.300 0.270 - 4.200 uIU/mL   T4, Free    Specimen: Blood   Result Value Ref Range    Free T4 1.29 0.92 - 1.68 ng/dL   Lipid Panel With LDL / HDL Ratio    Specimen: Blood   Result Value Ref Range    Total Cholesterol 174 0 - 200 mg/dL    Triglycerides 99 0 - 150 mg/dL    HDL Cholesterol 63 (H) 40 - 60 mg/dL    VLDL Cholesterol Jose Juan 18 5 - 40 mg/dL    LDL Chol Calc (NIH) 93 0 - 100 mg/dL    LDL/HDL RATIO 1.45    Hemoglobin A1c    Specimen: Blood   Result Value Ref Range    Hemoglobin A1C 5.30 4.80 - 5.60 %   Comprehensive Metabolic Panel    Specimen: Blood   Result Value Ref Range    Glucose 88 65 - 99 mg/dL    BUN 17 8 - 23 mg/dL    Creatinine 0.81 0.57 - 1.00 mg/dL    EGFR Result 74.4 >60.0 mL/min/1.73    BUN/Creatinine Ratio 21.0 7.0 - 25.0    Sodium 141 136 - 145 mmol/L    Potassium 3.6 3.5 - 5.2 mmol/L    Chloride 103 98 - 107 mmol/L    Total CO2 28.4 22.0 - 29.0 mmol/L    Calcium 9.5 8.6 - 10.5 mg/dL    Total Protein 6.2 6.0 - 8.5 g/dL    Albumin 4.1 3.5 - 5.2 g/dL    Globulin 2.1 gm/dL    A/G Ratio 2.0 g/dL    Total Bilirubin 0.4 0.0 - 1.2 mg/dL    Alkaline Phosphatase 56 39 - 117 U/L    AST (SGOT) 14 1 - 32 U/L    ALT (SGPT) 16 1 - 33 U/L   CBC & Differential    Specimen: Blood   Result Value Ref Range    WBC 3.45 3.40 - 10.80 10*3/mm3    RBC 3.54 (L) 3.77 - 5.28 10*6/mm3    Hemoglobin 12.2 12.0 - 15.9 g/dL    Hematocrit 35.4 34.0 - 46.6 %    .0 (H) 79.0 - 97.0 fL    MCH 34.5 (H) 26.6 - 33.0 pg    MCHC 34.5 31.5 - 35.7 g/dL    RDW 12.0 (L) 12.3 - 15.4 %    Platelets 200 140 - 450 10*3/mm3    Neutrophil Rel % 54.4 42.7 - 76.0 %    Lymphocyte Rel % 32.2 19.6 - 45.3 %    Monocyte Rel % 9.9 5.0 - 12.0 %    Eosinophil Rel % 2.9 0.3 - 6.2 %    Basophil Rel % 0.6 0.0 - 1.5 %    Neutrophils Absolute 1.88 1.70 - 7.00 10*3/mm3    Lymphocytes Absolute 1.11 0.70 - 3.10 10*3/mm3    Monocytes Absolute 0.34 0.10 - 0.90 10*3/mm3    Eosinophils Absolute 0.10 0.00 - 0.40 10*3/mm3    Basophils Absolute 0.02 0.00 - 0.20  10*3/mm3    Immature Granulocyte Rel % 0.0 0.0 - 0.5 %    Immature Grans Absolute 0.00 0.00 - 0.05 10*3/mm3    nRBC 0.0 0.0 - 0.2 /100 WBC     Result Review :                  Assessment and Plan    Diagnoses and all orders for this visit:    1. Sleep difficulties  -     traZODone (DESYREL) 50 MG tablet; Take 1 tablet by mouth Every Night.  Dispense: 90 tablet; Refill: 1    Other orders  -     sertraline (Zoloft) 50 MG tablet; Take 0.5 tablets by mouth Daily for 7 days, THEN 1 tablet Daily for 22 days.  Dispense: 26 tablet; Refill: 0                       Outpatient Medications Prior to Visit   Medication Sig Dispense Refill    acetaminophen (TYLENOL) 650 MG 8 hr tablet Take 1 tablet by mouth Every 8 (Eight) Hours As Needed.      aspirin (aspirin) 81 MG EC tablet Take 1 tablet by mouth Daily. 90 tablet 3    atorvastatin (LIPITOR) 40 MG tablet TAKE 1/2 TABLET BY MOUTH DAILY 45 tablet 3    B Complex Vitamins (VITAMIN B COMPLEX IJ) Take 1 tablet by mouth 2 (two) times a day.      carvedilol (COREG) 6.25 MG tablet Take 2 tablets by mouth 2 (Two) Times a Day With Meals. 360 tablet 1    Cholecalciferol (VITAMIN D-3) 1000 UNITS capsule Take 1,000 Units by mouth Every Night.      CO ENZYME Q-10 PO Take 100 mg by mouth 2 (two) times a day.      fexofenadine (ALLEGRA) 60 MG tablet Take 1 tablet by mouth Daily.      Flaxseed, Linseed, (FLAXSEED OIL) 1200 MG capsule Take 2 capsules by mouth daily.      Ginkgo Biloba Extract 60 MG capsule Take 1 tablet by mouth every morning.      Glucosamine-Chondroit-Vit C-Mn (GLUCOSAMINE CHONDR 1500 COMPLX) capsule Take 2 capsules by mouth every morning.      isosorbide mononitrate (IMDUR) 30 MG 24 hr tablet TAKE ONE TABLET BY MOUTH DAILY 90 tablet 3    levothyroxine (SYNTHROID, LEVOTHROID) 50 MCG tablet TAKE 1 TABLET BY MOUTH EVERY MORNING 90 tablet 1    losartan-hydrochlorothiazide (HYZAAR) 100-25 MG per tablet TAKE 1 TABLET BY MOUTH DAILY 90 tablet 0    Misc Natural Products (ECHINACEA  GOLDENSEAL PLUS) capsule Take 900 mg by mouth every morning.      Multiple Vitamin (MULTIVITAMINS PO) Take 1 tablet by mouth daily.      pantoprazole (PROTONIX) 40 MG EC tablet TAKE ONE TABLET BY MOUTH DAILY 90 tablet 3    Potassium Gluconate 595 MG capsule Take 595 mcg/day by mouth every morning.      S-Adenosylmethionine (ALLY-e) 200 MG capsule Take 200 mg by mouth 4 (Four) Times a Day.      solifenacin (VESICARE) 10 MG tablet TAKE 1 TABLET BY MOUTH DAILY 30 tablet 0    traZODone (DESYREL) 50 MG tablet TAKE 1/2 TABLET BY MOUTH ONCE NIGHTLY AS NEEDED FOR SLEEP 15 tablet 0    amoxicillin (AMOXIL) 500 MG capsule Take 1 capsule by mouth As Needed (use before Dentist). (Patient not taking: Reported on 3/1/2024)       No facility-administered medications prior to visit.     New Medications Ordered This Visit   Medications    traZODone (DESYREL) 50 MG tablet     Sig: Take 1 tablet by mouth Every Night.     Dispense:  90 tablet     Refill:  1    sertraline (Zoloft) 50 MG tablet     Sig: Take 0.5 tablets by mouth Daily for 7 days, THEN 1 tablet Daily for 22 days.     Dispense:  26 tablet     Refill:  0     [unfilled]  Medications Discontinued During This Encounter   Medication Reason    traZODone (DESYREL) 50 MG tablet Reorder         Return in about 4 weeks (around 9/27/2024) for Recheck.    Patient was given instructions and counseling regarding her condition or for health maintenance advice. Please see specific information pulled into the AVS if appropriate.

## 2024-09-22 DIAGNOSIS — G47.9 SLEEP DIFFICULTIES: ICD-10-CM

## 2024-09-22 DIAGNOSIS — I10 ESSENTIAL HYPERTENSION: ICD-10-CM

## 2024-09-24 DIAGNOSIS — G47.9 SLEEP DIFFICULTIES: ICD-10-CM

## 2024-09-24 RX ORDER — TRAZODONE HYDROCHLORIDE 50 MG/1
TABLET, FILM COATED ORAL
Qty: 15 TABLET | OUTPATIENT
Start: 2024-09-24

## 2024-09-24 RX ORDER — LOSARTAN POTASSIUM AND HYDROCHLOROTHIAZIDE 25; 100 MG/1; MG/1
1 TABLET ORAL DAILY
Qty: 90 TABLET | Refills: 1 | Status: SHIPPED | OUTPATIENT
Start: 2024-09-24

## 2024-09-27 RX ORDER — TRAZODONE HYDROCHLORIDE 50 MG/1
TABLET, FILM COATED ORAL
Qty: 15 TABLET | Refills: 0 | Status: SHIPPED | OUTPATIENT
Start: 2024-09-27

## 2024-10-08 ENCOUNTER — OFFICE VISIT (OUTPATIENT)
Dept: INTERNAL MEDICINE | Facility: CLINIC | Age: 79
End: 2024-10-08
Payer: MEDICARE

## 2024-10-08 VITALS
HEART RATE: 93 BPM | WEIGHT: 178 LBS | DIASTOLIC BLOOD PRESSURE: 60 MMHG | TEMPERATURE: 99.5 F | SYSTOLIC BLOOD PRESSURE: 112 MMHG | OXYGEN SATURATION: 95 % | BODY MASS INDEX: 32.56 KG/M2

## 2024-10-08 DIAGNOSIS — R50.9 FEVER OF UNKNOWN ORIGIN: ICD-10-CM

## 2024-10-08 DIAGNOSIS — R05.1 ACUTE COUGH: ICD-10-CM

## 2024-10-08 DIAGNOSIS — N10 ACUTE PYELONEPHRITIS: Primary | ICD-10-CM

## 2024-10-08 LAB
BILIRUB BLD-MCNC: NEGATIVE MG/DL
CLARITY, POC: CLEAR
COLOR UR: YELLOW
EXPIRATION DATE: 5725
EXPIRATION DATE: ABNORMAL
EXPIRATION DATE: NORMAL
FLUAV AG NPH QL: NEGATIVE
FLUBV AG NPH QL: NEGATIVE
GLUCOSE UR STRIP-MCNC: NEGATIVE MG/DL
INTERNAL CONTROL: NORMAL
INTERNAL CONTROL: NORMAL
KETONES UR QL: NEGATIVE
LEUKOCYTE EST, POC: ABNORMAL
Lab: ABNORMAL
Lab: NORMAL
Lab: NORMAL
NITRITE UR-MCNC: POSITIVE MG/ML
PH UR: 5.5 [PH] (ref 5–8)
PROT UR STRIP-MCNC: ABNORMAL MG/DL
RBC # UR STRIP: ABNORMAL /UL
SARS-COV-2 AG UPPER RESP QL IA.RAPID: NOT DETECTED
SP GR UR: 1.01 (ref 1–1.03)
UROBILINOGEN UR QL: NORMAL

## 2024-10-08 PROCEDURE — 1126F AMNT PAIN NOTED NONE PRSNT: CPT | Performed by: NURSE PRACTITIONER

## 2024-10-08 PROCEDURE — 3074F SYST BP LT 130 MM HG: CPT | Performed by: NURSE PRACTITIONER

## 2024-10-08 PROCEDURE — 87426 SARSCOV CORONAVIRUS AG IA: CPT | Performed by: NURSE PRACTITIONER

## 2024-10-08 PROCEDURE — 3078F DIAST BP <80 MM HG: CPT | Performed by: NURSE PRACTITIONER

## 2024-10-08 PROCEDURE — 99214 OFFICE O/P EST MOD 30 MIN: CPT | Performed by: NURSE PRACTITIONER

## 2024-10-08 PROCEDURE — 87804 INFLUENZA ASSAY W/OPTIC: CPT | Performed by: NURSE PRACTITIONER

## 2024-10-08 PROCEDURE — 81003 URINALYSIS AUTO W/O SCOPE: CPT | Performed by: NURSE PRACTITIONER

## 2024-10-08 RX ORDER — SULFAMETHOXAZOLE/TRIMETHOPRIM 800-160 MG
1 TABLET ORAL 2 TIMES DAILY
Qty: 14 TABLET | Refills: 0 | Status: SHIPPED | OUTPATIENT
Start: 2024-10-08

## 2024-10-08 NOTE — PROGRESS NOTES
Chief Complaint   Patient presents with    Cough     Started Saturday     Fever    Fatigue       Subjective     Carla Serrano is a 79 y.o. female being seen for an acute visit for fatigue and fever since Saturday. She reports sleeping all day Saturday due to illness. No know exposures. Associated cough, nasal congestion and nausea. Denies vomiting, diarrhea, urinary changes (hx incontinence), rash, abd pain, chest pain, SOA. She is taking tylenol as needed.     Cough  Associated symptoms include a fever. Pertinent negatives include no chest pain or shortness of breath.   Fever   Associated symptoms include coughing. Pertinent negatives include no chest pain.   Fatigue  Associated symptoms include coughing, fatigue and a fever. Pertinent negatives include no chest pain.        Allergies   Allergen Reactions    Latex Rash         Current Outpatient Medications:     acetaminophen (TYLENOL) 650 MG 8 hr tablet, Take 1 tablet by mouth Every 8 (Eight) Hours As Needed., Disp: , Rfl:     amoxicillin (AMOXIL) 500 MG capsule, Take 1 capsule by mouth As Needed (use before Dentist)., Disp: , Rfl:     aspirin (aspirin) 81 MG EC tablet, Take 1 tablet by mouth Daily., Disp: 90 tablet, Rfl: 3    atorvastatin (LIPITOR) 40 MG tablet, TAKE 1/2 TABLET BY MOUTH DAILY, Disp: 45 tablet, Rfl: 3    B Complex Vitamins (VITAMIN B COMPLEX IJ), Take 1 tablet by mouth 2 (two) times a day., Disp: , Rfl:     carvedilol (COREG) 6.25 MG tablet, Take 2 tablets by mouth 2 (Two) Times a Day With Meals., Disp: 360 tablet, Rfl: 1    Cholecalciferol (VITAMIN D-3) 1000 UNITS capsule, Take 1,000 Units by mouth Every Night., Disp: , Rfl:     CO ENZYME Q-10 PO, Take 100 mg by mouth 2 (two) times a day., Disp: , Rfl:     fexofenadine (ALLEGRA) 60 MG tablet, Take 1 tablet by mouth Daily., Disp: , Rfl:     Flaxseed, Linseed, (FLAXSEED OIL) 1200 MG capsule, Take 2 capsules by mouth daily., Disp: , Rfl:     Ginkgo Biloba Extract 60 MG capsule, Take 1 tablet by  mouth every morning., Disp: , Rfl:     Glucosamine-Chondroit-Vit C-Mn (GLUCOSAMINE CHONDR 1500 COMPLX) capsule, Take 2 capsules by mouth every morning., Disp: , Rfl:     isosorbide mononitrate (IMDUR) 30 MG 24 hr tablet, TAKE ONE TABLET BY MOUTH DAILY, Disp: 90 tablet, Rfl: 3    levothyroxine (SYNTHROID, LEVOTHROID) 50 MCG tablet, TAKE 1 TABLET BY MOUTH EVERY MORNING, Disp: 90 tablet, Rfl: 1    losartan-hydrochlorothiazide (HYZAAR) 100-25 MG per tablet, TAKE 1 TABLET BY MOUTH DAILY, Disp: 90 tablet, Rfl: 1    Misc Natural Products (ECHINACEA GOLDENSEAL PLUS) capsule, Take 900 mg by mouth every morning., Disp: , Rfl:     Multiple Vitamin (MULTIVITAMINS PO), Take 1 tablet by mouth daily., Disp: , Rfl:     pantoprazole (PROTONIX) 40 MG EC tablet, TAKE ONE TABLET BY MOUTH DAILY, Disp: 90 tablet, Rfl: 3    Potassium Gluconate 595 MG capsule, Take 595 mcg/day by mouth every morning., Disp: , Rfl:     S-Adenosylmethionine (ALLY-e) 200 MG capsule, Take 200 mg by mouth 4 (Four) Times a Day., Disp: , Rfl:     solifenacin (VESICARE) 10 MG tablet, TAKE 1 TABLET BY MOUTH DAILY, Disp: 30 tablet, Rfl: 0    traZODone (DESYREL) 50 MG tablet, TAKE 1/2 TABLET BY MOUTH ONCE NIGHTLY AS NEEDED FOR SLEEP, Disp: 15 tablet, Rfl: 0    sertraline (Zoloft) 50 MG tablet, Take 0.5 tablets by mouth Daily for 7 days, THEN 1 tablet Daily for 22 days., Disp: 26 tablet, Rfl: 0    The following portions of the patient's history were reviewed and updated as appropriate: allergies, current medications, past family history, past medical history, past social history, past surgical history, and problem list.    Review of Systems   Constitutional:  Positive for fatigue and fever.   Eyes: Negative.    Respiratory:  Positive for cough. Negative for shortness of breath and stridor.    Cardiovascular:  Negative for chest pain.   Endocrine: Negative.    Genitourinary:  Positive for frequency.   Allergic/Immunologic: Negative.    Neurological: Negative.         Assessment     Physical Exam  Vitals reviewed.   Constitutional:       Appearance: Normal appearance. She is not ill-appearing.   HENT:      Head: Normocephalic.      Right Ear: Tympanic membrane normal.      Left Ear: Tympanic membrane normal.      Nose: Nose normal. No congestion or rhinorrhea.      Mouth/Throat:      Pharynx: No oropharyngeal exudate or posterior oropharyngeal erythema.   Cardiovascular:      Rate and Rhythm: Normal rate and regular rhythm.      Pulses: Normal pulses.      Heart sounds: Normal heart sounds. No murmur heard.  Pulmonary:      Effort: Pulmonary effort is normal. No respiratory distress.      Breath sounds: Normal breath sounds. No stridor. No wheezing, rhonchi or rales.   Musculoskeletal:      Cervical back: Neck supple.      Right lower leg: No edema.      Left lower leg: No edema.   Skin:     General: Skin is warm and dry.   Neurological:      Mental Status: She is alert and oriented to person, place, and time. Mental status is at baseline.      Gait: Gait abnormal.   Psychiatric:         Mood and Affect: Mood normal.         Behavior: Behavior normal.       Plan     POC Covid negative  POC Flu A and B negative  POC urine positive nitrites       Diagnosis Plan   1. Acute pyelonephritis  CBC (No Diff)    Comprehensive Metabolic Panel    sulfamethoxazole-trimethoprim (Bactrim DS) 800-160 MG per tablet      2. Fever of unknown origin  POC Influenza A / B    POCT ELIAS SARS-CoV-2 Antigen EDISON    POCT urinalysis dipstick, automated    CBC (No Diff)    Comprehensive Metabolic Panel    EBV Antibody Profile      3. Acute cough  POC Influenza A / B    POCT ELIAS SARS-CoV-2 Antigen EDISON    EBV Antibody Profile           Due to fever, concern for sepsis development. Urine culture sent.    Set up a f/u with urine in 2 weeks w PCP

## 2024-10-09 LAB
ALBUMIN SERPL-MCNC: 3.9 G/DL (ref 3.5–5.2)
ALBUMIN/GLOB SERPL: 1.6 G/DL
ALP SERPL-CCNC: 86 U/L (ref 39–117)
ALT SERPL-CCNC: 22 U/L (ref 1–33)
AST SERPL-CCNC: 26 U/L (ref 1–32)
BILIRUB SERPL-MCNC: 0.8 MG/DL (ref 0–1.2)
BUN SERPL-MCNC: 21 MG/DL (ref 8–23)
BUN/CREAT SERPL: 21.6 (ref 7–25)
CALCIUM SERPL-MCNC: 10 MG/DL (ref 8.6–10.5)
CHLORIDE SERPL-SCNC: 100 MMOL/L (ref 98–107)
CO2 SERPL-SCNC: 23 MMOL/L (ref 22–29)
CREAT SERPL-MCNC: 0.97 MG/DL (ref 0.57–1)
EBV NA IGG SER IA-ACNC: 162 U/ML (ref 0–17.9)
EBV VCA IGG SER IA-ACNC: 313 U/ML (ref 0–17.9)
EBV VCA IGM SER IA-ACNC: <36 U/ML (ref 0–35.9)
EGFRCR SERPLBLD CKD-EPI 2021: 59.9 ML/MIN/1.73
ERYTHROCYTE [DISTWIDTH] IN BLOOD BY AUTOMATED COUNT: 12.3 % (ref 12.3–15.4)
GLOBULIN SER CALC-MCNC: 2.5 GM/DL
GLUCOSE SERPL-MCNC: 113 MG/DL (ref 65–99)
HCT VFR BLD AUTO: 35.1 % (ref 34–46.6)
HGB BLD-MCNC: 12.1 G/DL (ref 12–15.9)
MCH RBC QN AUTO: 34.3 PG (ref 26.6–33)
MCHC RBC AUTO-ENTMCNC: 34.5 G/DL (ref 31.5–35.7)
MCV RBC AUTO: 99.4 FL (ref 79–97)
PLATELET # BLD AUTO: 192 10*3/MM3 (ref 140–450)
POTASSIUM SERPL-SCNC: 2.8 MMOL/L (ref 3.5–5.2)
PROT SERPL-MCNC: 6.4 G/DL (ref 6–8.5)
RBC # BLD AUTO: 3.53 10*6/MM3 (ref 3.77–5.28)
SERVICE CMNT-IMP: ABNORMAL
SODIUM SERPL-SCNC: 138 MMOL/L (ref 136–145)
WBC # BLD AUTO: 8.19 10*3/MM3 (ref 3.4–10.8)

## 2024-10-09 RX ORDER — ISOSORBIDE MONONITRATE 30 MG/1
30 TABLET, EXTENDED RELEASE ORAL DAILY
Qty: 90 TABLET | Refills: 3 | Status: SHIPPED | OUTPATIENT
Start: 2024-10-09

## 2024-10-11 ENCOUNTER — TELEPHONE (OUTPATIENT)
Dept: INTERNAL MEDICINE | Facility: CLINIC | Age: 79
End: 2024-10-11

## 2024-10-11 LAB
BACTERIA UR CULT: ABNORMAL
OTHER ANTIBIOTIC SUSC ISLT: ABNORMAL

## 2024-10-11 NOTE — TELEPHONE ENCOUNTER
Caller: Carla Serrano     Relationship: [unfilled]     Best call back number: [phone]     What is your medical concern? PATIENT CALLED STATED SHE HAS RECTAL BLEEDING AFTER TAKING THE MEDICATION     sulfamethoxazole-trimethoprim (Bactrim DS) 800-160 MG per tablet   IT IS LITTLE BUT NOT AS MUCH AS MENSTRUAL PERIOD.  SHE STARTED TAKING THE MEDICATION ON TUESDAY 10/08/24 AND NOTICED THE BLEEDING ON WEDNESDAY MORNING 10/9/24. WANTS TO KNOW IF SHE SHOULD CONTINUE THIS MEDICATION DUE TO SUPPOSE TO TAKE FOR 7 DAYS AND THIS JUST THIRD DAY, EACH DAY GETS WORSE.  SHE HAD UTI AND TAKING THIS MEDICATION FOR THE INFECTION.  BACTERIA INFECTION IS GETTING BETTER, BUT STILL HAS THE COUGH, NO FEVER.  PLEASE CALL HER BACK AND LET HER KNOW WHAT SHE NEEDS TO DO.

## 2024-10-17 NOTE — TELEPHONE ENCOUNTER
I called and spoke with patient and she informed me that she is feeling 100% better and she declined being scheduled for an appointment.

## 2024-11-26 RX ORDER — CARVEDILOL 6.25 MG/1
TABLET ORAL
Qty: 360 TABLET | Refills: 1 | Status: SHIPPED | OUTPATIENT
Start: 2024-11-26

## 2024-11-26 NOTE — TELEPHONE ENCOUNTER
Rx Refill Note  Requested Prescriptions     Pending Prescriptions Disp Refills    carvedilol (COREG) 6.25 MG tablet [Pharmacy Med Name: CARVEDILOL 6.25 MG TABLET] 360 tablet 1     Sig: TAKE TWO TABLETS BY MOUTH TWICE A DAY WITH A MEAL      Last office visit with prescribing clinician: 8/30/2024   Last telemedicine visit with prescribing clinician: Visit date not found   Next office visit with prescribing clinician: Visit date not found                         Would you like a call back once the refill request has been completed: [] Yes [] No    If the office needs to give you a call back, can they leave a voicemail: [] Yes [] No    Maki Peralta MA  11/26/24, 09:34 EST

## 2024-12-19 ENCOUNTER — OFFICE VISIT (OUTPATIENT)
Dept: CARDIOLOGY | Facility: CLINIC | Age: 79
End: 2024-12-19
Payer: MEDICARE

## 2024-12-19 VITALS
BODY MASS INDEX: 33.27 KG/M2 | SYSTOLIC BLOOD PRESSURE: 110 MMHG | WEIGHT: 180.8 LBS | DIASTOLIC BLOOD PRESSURE: 74 MMHG | HEART RATE: 65 BPM | HEIGHT: 62 IN | OXYGEN SATURATION: 93 %

## 2024-12-19 DIAGNOSIS — Z79.899 HIGH RISK MEDICATION USE: ICD-10-CM

## 2024-12-19 DIAGNOSIS — E78.2 MIXED HYPERLIPIDEMIA: ICD-10-CM

## 2024-12-19 DIAGNOSIS — I47.10 SVT (SUPRAVENTRICULAR TACHYCARDIA): ICD-10-CM

## 2024-12-19 DIAGNOSIS — I25.810 CORONARY ARTERY DISEASE INVOLVING CORONARY BYPASS GRAFT OF NATIVE HEART WITHOUT ANGINA PECTORIS: Primary | ICD-10-CM

## 2024-12-19 DIAGNOSIS — I10 ESSENTIAL HYPERTENSION: ICD-10-CM

## 2024-12-19 PROCEDURE — 3074F SYST BP LT 130 MM HG: CPT | Performed by: INTERNAL MEDICINE

## 2024-12-19 PROCEDURE — 3078F DIAST BP <80 MM HG: CPT | Performed by: INTERNAL MEDICINE

## 2024-12-19 RX ORDER — LOSARTAN POTASSIUM AND HYDROCHLOROTHIAZIDE 25; 100 MG/1; MG/1
0.5 TABLET ORAL DAILY
Start: 2024-12-19

## 2024-12-19 RX ORDER — SPIRONOLACTONE 50 MG/1
50 TABLET, FILM COATED ORAL DAILY
Qty: 90 TABLET | Refills: 3 | Status: SHIPPED | OUTPATIENT
Start: 2024-12-19

## 2024-12-19 NOTE — PROGRESS NOTES
PATIENTINFORMATION    Date of Office Visit: 2024  Encounter Provider: Antony Galvin MD  Place of Service: [facility] CARDIOLOGY  Patient Name: Carla Serrano  : 1945    Subjective:     Encounter Date:2024      Patient ID: Carla Serrano is a 79 y.o. female.    Chief Complaint   Patient presents with    Follow-up       HPI  Ms. Serrano is a pleasant 79 years old lady who came to cardiac clinic for follow-up visit.  She is fairly active and denies rest or exertional chest pain, significant shortness of breath, orthopnea, PND, palpitations, presyncope  or extremity swelling.  No recent ER visit  Compliant with current medications without known side effects.      ROS  All systems reviewed and negative except as noted in HPI.    Past Medical History:   Diagnosis Date    Allergic rhinitis     Anemia     Anxiety     Arthritis     CAD (coronary artery disease)     h/o CABG x3    Cardiomyopathy     Degenerative joint disease of knee, left     sched replacement    Edema     GERD (gastroesophageal reflux disease)     Heart disease     Hiatal hernia     Hyperlipidemia     Hypertension     Hypothyroidism     Incontinence in female 08/10/2017    JOVANNA (obstructive sleep apnea)     no machine, uses nasal strips    Osteoporosis     Prediabetes     SVT (supraventricular tachycardia)     Urinary, incontinence, stress female        Past Surgical History:   Procedure Laterality Date    BREAST BIOPSY Left     CORONARY ARTERY BYPASS GRAFT      CORONARY ARTERY BYPASS GRAFT      triple bypass @ [facility]    HYSTERECTOMY      KNEE SURGERY Left      cartilage removal    [place] ARTHRP KNE CONDYLE&PLATU MEDIAL&LAT COMPARTMENTS Left 3/29/2016    Procedure: LEFT TOTAL KNEE ARTHROPLASTY placement of hemovac drain;  Surgeon: Tj Barraza MD;  Location: [facility];  Service: Orthopedics       Social History     Socioeconomic History    Marital status:    Tobacco Use    Smoking  "status: Never    Smokeless tobacco: Never   Vaping Use    Vaping status: Never Used   Substance and Sexual Activity    Alcohol use: No    Drug use: No    Sexual activity: Defer       Family History   Problem Relation Age of Onset    Heart disease Mother 50    Heart attack Father 50    Breast cancer Sister     Arrhythmia Sister     Coronary artery disease Other          Procedures       Objective:     /74   Pulse 65   Ht 157.5 cm (62\")   Wt 82 kg (180 lb 12.8 oz)   SpO2 93%   BMI 33.07 kg/m²  Body mass index is 33.07 kg/m².     Constitutional:       General: Not in acute distress.     Appearance: Well-developed. Not diaphoretic.   Eyes:      Pupils: Pupils are equal, round, and reactive to light.   HENT:      Head: Normocephalic and atraumatic.   Neck:      Thyroid: No thyromegaly.   Pulmonary:      Effort: Pulmonary effort is normal. No respiratory distress.      Breath sounds: Normal breath sounds. No wheezing. No rales.   Chest:      Chest wall: Not tender to palpatation.   Cardiovascular:      Normal rate. Regular rhythm.      No gallop.    Pulses:     Intact distal pulses.   Edema:     Peripheral edema absent.   Abdominal:      General: Bowel sounds are normal. There is no distension.      Palpations: Abdomen is soft.      Tenderness: There is no guarding.   Musculoskeletal: Normal range of motion.         General: No deformity.      Cervical back: Normal range of motion and neck supple. Skin:     General: Skin is warm and dry.      Findings: No rash.   Neurological:      Mental Status: Alert and oriented to person, place, and time.      Cranial Nerves: No cranial nerve deficit.      Deep Tendon Reflexes: Reflexes are normal and symmetric.   Psychiatric:         Judgment: Judgment normal.         Review Of Data: I have reviewed pertinent recent labs, images and documents and pertinent findings included in HPI or assessment below.    Lipid Panel          2/23/2024    09:30 8/23/2024    09:07   Lipid " Panel   Total Cholesterol 184  174    Triglycerides 105  99    HDL Cholesterol 66  63    VLDL Cholesterol 19  18    LDL Cholesterol  99  93    LDL/HDL Ratio 1.47  1.45        Assessment/Plan:     Hx of CAD s/p CABGX3 in 2011 with LIMA to LAD, SVG to OM and SVG to PDA. Myocardial perfusion study on 3/24/2022 showed small inferolateral scar with mild cora-infarct ischemia. Prior echocardiogram shows preserved left ventricular ejection fraction.Hx of intolerance to Plavix started during prior visits.  Currently on aspirin and a statin.  Hypercholesterolemia-on high intensity statin-lipid panel near goal  Hypertension-on Coreg and losartan/hydrochlorthiazide  History of paroxysmal SVT in the perioop periord in 2011-remains in sinus rhythm  Obstructive sleep apnea but could not tolerate CPAP   Chronic degenerative joint disease-left knee replaced.    Bilateral lower extremity pitting edema -about the same and her weight same also.  Stress/depression/sleep problems-significantly improved with trazodone  Chronic hypokalemia on oral supplement    Not sure if chronic hypokalemia is caused by HCTZ.  I have decreased losartan/hydrochlorothiazide by half and added spironolactone 50 mg p.o. daily.  She will stop taking potassium pills.  BMP in 1 week.  Otherwise she is doing fairly well with normal vital signs and no significant complaints today.  Diagnosis and plan of care discussed with patient and verbalized understanding.            Your medication list            Accurate as of December 19, 2024  4:14 PM. If you have any questions, ask your nurse or doctor.                START taking these medications        Instructions Last Dose Given Next Dose Due   spironolactone 50 MG tablet  Commonly known as: Aldactone  Started by: Antony Galvin      Take 1 tablet by mouth Daily.              CHANGE how you take these medications        Instructions Last Dose Given Next Dose Due   losartan-hydrochlorothiazide 100-25 MG per  tablet  Commonly known as: HYZAAR  What changed: how much to take  Changed by: Antony Galvin      Take 0.5 tablets by mouth Daily.              CONTINUE taking these medications        Instructions Last Dose Given Next Dose Due   acetaminophen 650 MG 8 hr tablet  Commonly known as: TYLENOL      Take 1 tablet by mouth Every 8 (Eight) Hours As Needed.       aspirin 81 MG EC tablet      Take 1 tablet by mouth Daily.       atorvastatin 40 MG tablet  Commonly known as: LIPITOR      TAKE 1/2 TABLET BY MOUTH DAILY       carvedilol 6.25 MG tablet  Commonly known as: COREG      TAKE TWO TABLETS BY MOUTH TWICE A DAY WITH A MEAL       CO ENZYME Q-10 PO      Take 100 mg by mouth 2 (two) times a day.       Echinacea Goldenseal Plus capsule      Take 900 mg by mouth every morning.       fexofenadine 60 MG tablet  Commonly known as: ALLEGRA      Take 1 tablet by mouth Daily.       Flaxseed Oil 1200 MG capsule      Take 2 capsules by mouth daily.       Ginkgo Biloba Extract 60 MG capsule      Take 1 tablet by mouth every morning.       Glucosamine Chondr 1500 Complx capsule      Take 2 capsules by mouth every morning.       isosorbide mononitrate 30 MG 24 hr tablet  Commonly known as: IMDUR      TAKE 1 TABLET BY MOUTH DAILY       levothyroxine 50 MCG tablet  Commonly known as: SYNTHROID, LEVOTHROID      TAKE 1 TABLET BY MOUTH EVERY MORNING       MULTIVITAMINS PO      Take 1 tablet by mouth daily.       pantoprazole 40 MG EC tablet  Commonly known as: PROTONIX      TAKE ONE TABLET BY MOUTH DAILY       ALLY-e 200 MG capsule      Take 200 mg by mouth 4 (Four) Times a Day.       VITAMIN B COMPLEX IJ      Take 1 tablet by mouth 2 (two) times a day.       Vitamin D-3 25 MCG (1000 UT) capsule      Take 1,000 Units by mouth Every Night.              STOP taking these medications      amoxicillin 500 MG capsule  Commonly known as: AMOXIL  Stopped by: Antony Galvin        Potassium Gluconate 595 MG capsule  Stopped by: Antony  Glenis        sertraline 50 MG tablet  Commonly known as: Zoloft  Stopped by: Antony Galvin        solifenacin 10 MG tablet  Commonly known as: VESICARE  Stopped by: Antony Galvin        sulfamethoxazole-trimethoprim 800-160 MG per tablet  Commonly known as: Bactrim DS  Stopped by: Antony Galvin        traZODone 50 MG tablet  Commonly known as: DESYREL  Stopped by: Antony Galvin                  Where to Get Your Medications        These medications were sent to [facility] [id number] - [place] - [address] - [phone]  - [phone]   [address] [address]      Phone: [phone]   spironolactone 50 MG tablet       Information about where to get these medications is not yet available    Ask your nurse or doctor about these medications  losartan-hydrochlorothiazide 100-25 MG per tablet             Antony Galvin MD  12/19/24  16:14 EST

## 2024-12-23 RX ORDER — PANTOPRAZOLE SODIUM 40 MG/1
40 TABLET, DELAYED RELEASE ORAL DAILY
Qty: 90 TABLET | Refills: 3 | Status: SHIPPED | OUTPATIENT
Start: 2024-12-23

## 2025-01-03 ENCOUNTER — CLINICAL SUPPORT (OUTPATIENT)
Dept: CARDIOLOGY | Facility: CLINIC | Age: 80
End: 2025-01-03
Payer: MEDICARE

## 2025-01-03 VITALS — HEART RATE: 67 BPM | SYSTOLIC BLOOD PRESSURE: 110 MMHG | DIASTOLIC BLOOD PRESSURE: 90 MMHG

## 2025-01-03 NOTE — PROGRESS NOTES
Pt present today for a BP Check. Allergies, Hx, and Medications reviewed and confirmed.     BP/HR are as follows: 110/90 HR 67       Pt states no c/o     Per NM, pt okay to go home today.    Recommendations:    Continue same meds and keep normal follow up. Advised the pt to call with any issues.        Cira AZMBRANO CMA

## 2025-01-25 DIAGNOSIS — E03.9 ACQUIRED HYPOTHYROIDISM: ICD-10-CM

## 2025-01-29 RX ORDER — LEVOTHYROXINE SODIUM 50 UG/1
50 TABLET ORAL EVERY MORNING
Qty: 90 TABLET | Refills: 1 | Status: SHIPPED | OUTPATIENT
Start: 2025-01-29

## 2025-01-29 NOTE — TELEPHONE ENCOUNTER
Rx Refill Note  Requested Prescriptions     Pending Prescriptions Disp Refills    levothyroxine (SYNTHROID, LEVOTHROID) 50 MCG tablet [Pharmacy Med Name: LEVOTHYROXINE 50 MCG TABLET] 90 tablet 1     Sig: TAKE 1 TABLET BY MOUTH EVERY MORNING      Last office visit with prescribing clinician: 8/30/2024   Last telemedicine visit with prescribing clinician: Visit date not found   Next office visit with prescribing clinician: Visit date not found                         Would you like a call back once the refill request has been completed: [] Yes [] No    If the office needs to give you a call back, can they leave a voicemail: [] Yes [] No    Mari Post MA  01/29/25, 15:54 EST

## 2025-02-06 DIAGNOSIS — G47.9 SLEEP DIFFICULTIES: ICD-10-CM

## 2025-02-06 RX ORDER — TRAZODONE HYDROCHLORIDE 50 MG/1
50 TABLET, FILM COATED ORAL NIGHTLY
Qty: 90 TABLET | Refills: 1 | OUTPATIENT
Start: 2025-02-06

## 2025-02-12 DIAGNOSIS — G47.9 SLEEP DIFFICULTIES: ICD-10-CM

## 2025-02-13 RX ORDER — TRAZODONE HYDROCHLORIDE 50 MG/1
50 TABLET, FILM COATED ORAL NIGHTLY
Qty: 90 TABLET | Refills: 1 | OUTPATIENT
Start: 2025-02-13

## 2025-02-13 NOTE — TELEPHONE ENCOUNTER
Rx Refill Note  Requested Prescriptions     Refused Prescriptions Disp Refills    traZODone (DESYREL) 50 MG tablet [Pharmacy Med Name: traZODone 50 MG TABLET] 90 tablet 1     Sig: TAKE ONE TABLET BY MOUTH ONCE NIGHTLY     Refused By: ELOISA BURGER     Reason for Refusal: Patient has requested refill too soon      Last office visit with prescribing clinician: 8/30/2024   Last telemedicine visit with prescribing clinician: Visit date not found   Next office visit with prescribing clinician: Visit date not found                         Would you like a call back once the refill request has been completed: [] Yes [] No    If the office needs to give you a call back, can they leave a voicemail: [] Yes [] No    Eloisa Burger MA  02/13/25, 08:42 EST

## 2025-02-19 DIAGNOSIS — G47.9 SLEEP DIFFICULTIES: ICD-10-CM

## 2025-02-19 RX ORDER — TRAZODONE HYDROCHLORIDE 50 MG/1
50 TABLET, FILM COATED ORAL NIGHTLY
Qty: 90 TABLET | Refills: 1 | Status: SHIPPED | OUTPATIENT
Start: 2025-02-19

## 2025-02-19 NOTE — TELEPHONE ENCOUNTER
The original prescription was discontinued on 9/27/2024 by Zee Borges MA.     Rx Refill Note  Requested Prescriptions     Pending Prescriptions Disp Refills    traZODone (DESYREL) 50 MG tablet [Pharmacy Med Name: traZODone 50 MG TABLET] 90 tablet 1     Sig: TAKE ONE TABLET BY MOUTH ONCE NIGHTLY      Last office visit with prescribing clinician: 8/30/2024   Last telemedicine visit with prescribing clinician: Visit date not found   Next office visit with prescribing clinician: Visit date not found                         Would you like a call back once the refill request has been completed: [] Yes [] No    If the office needs to give you a call back, can they leave a voicemail: [] Yes [] No    Maki Peralta MA  02/19/25, 09:15 EST

## 2025-03-26 ENCOUNTER — TELEPHONE (OUTPATIENT)
Dept: INTERNAL MEDICINE | Facility: CLINIC | Age: 80
End: 2025-03-26
Payer: MEDICARE

## 2025-03-26 DIAGNOSIS — E03.9 ACQUIRED HYPOTHYROIDISM: Primary | ICD-10-CM

## 2025-03-26 DIAGNOSIS — I10 ESSENTIAL HYPERTENSION: ICD-10-CM

## 2025-03-26 DIAGNOSIS — R73.03 PREDIABETES: ICD-10-CM

## 2025-03-30 DIAGNOSIS — I10 ESSENTIAL HYPERTENSION: ICD-10-CM

## 2025-03-31 RX ORDER — LOSARTAN POTASSIUM AND HYDROCHLOROTHIAZIDE 25; 100 MG/1; MG/1
1 TABLET ORAL DAILY
Qty: 90 TABLET | OUTPATIENT
Start: 2025-03-31

## 2025-03-31 NOTE — TELEPHONE ENCOUNTER
Last filled By: Antony Galvin MD  but Dr Loera previously prescribed    Rx Refill Note  Requested Prescriptions     Pending Prescriptions Disp Refills    losartan-hydrochlorothiazide (HYZAAR) 100-25 MG per tablet [Pharmacy Med Name: LOSARTAN-HCTZ 100-25 MG TAB] 90 tablet      Sig: TAKE 1 TABLET BY MOUTH DAILY      Last office visit with prescribing clinician: 8/30/2024   Last telemedicine visit with prescribing clinician: Visit date not found   Next office visit with prescribing clinician: 4/15/2025                         Would you like a call back once the refill request has been completed: [] Yes [] No    If the office needs to give you a call back, can they leave a voicemail: [] Yes [] No    Maki Peralta MA  03/31/25, 09:53 EDT

## 2025-04-08 LAB
ALBUMIN SERPL-MCNC: 4.1 G/DL (ref 3.5–5.2)
ALBUMIN/GLOB SERPL: 1.6 G/DL
ALP SERPL-CCNC: 56 U/L (ref 39–117)
ALT SERPL-CCNC: 19 U/L (ref 1–33)
AST SERPL-CCNC: 21 U/L (ref 1–32)
BASOPHILS # BLD AUTO: 0.02 10*3/MM3 (ref 0–0.2)
BASOPHILS NFR BLD AUTO: 0.5 % (ref 0–1.5)
BILIRUB SERPL-MCNC: 0.5 MG/DL (ref 0–1.2)
BUN SERPL-MCNC: 18 MG/DL (ref 8–23)
BUN/CREAT SERPL: 19.4 (ref 7–25)
CALCIUM SERPL-MCNC: 10 MG/DL (ref 8.6–10.5)
CHLORIDE SERPL-SCNC: 104 MMOL/L (ref 98–107)
CHOLEST SERPL-MCNC: 188 MG/DL (ref 0–200)
CO2 SERPL-SCNC: 25.8 MMOL/L (ref 22–29)
CREAT SERPL-MCNC: 0.93 MG/DL (ref 0.57–1)
EGFRCR SERPLBLD CKD-EPI 2021: 63 ML/MIN/1.73
EOSINOPHIL # BLD AUTO: 0.09 10*3/MM3 (ref 0–0.4)
EOSINOPHIL NFR BLD AUTO: 2.3 % (ref 0.3–6.2)
ERYTHROCYTE [DISTWIDTH] IN BLOOD BY AUTOMATED COUNT: 12.1 % (ref 12.3–15.4)
GLOBULIN SER CALC-MCNC: 2.6 GM/DL
GLUCOSE SERPL-MCNC: 94 MG/DL (ref 65–99)
HBA1C MFR BLD: 5.3 % (ref 4.8–5.6)
HCT VFR BLD AUTO: 36.4 % (ref 34–46.6)
HDLC SERPL-MCNC: 68 MG/DL (ref 40–60)
HGB BLD-MCNC: 12.6 G/DL (ref 12–15.9)
IMM GRANULOCYTES # BLD AUTO: 0.01 10*3/MM3 (ref 0–0.05)
IMM GRANULOCYTES NFR BLD AUTO: 0.3 % (ref 0–0.5)
LDLC SERPL CALC-MCNC: 103 MG/DL (ref 0–100)
LDLC/HDLC SERPL: 1.49 {RATIO}
LYMPHOCYTES # BLD AUTO: 1.1 10*3/MM3 (ref 0.7–3.1)
LYMPHOCYTES NFR BLD AUTO: 27.8 % (ref 19.6–45.3)
MCH RBC QN AUTO: 34.7 PG (ref 26.6–33)
MCHC RBC AUTO-ENTMCNC: 34.6 G/DL (ref 31.5–35.7)
MCV RBC AUTO: 100.3 FL (ref 79–97)
MONOCYTES # BLD AUTO: 0.32 10*3/MM3 (ref 0.1–0.9)
MONOCYTES NFR BLD AUTO: 8.1 % (ref 5–12)
NEUTROPHILS # BLD AUTO: 2.41 10*3/MM3 (ref 1.7–7)
NEUTROPHILS NFR BLD AUTO: 61 % (ref 42.7–76)
NRBC BLD AUTO-RTO: 0 /100 WBC (ref 0–0.2)
PLATELET # BLD AUTO: 231 10*3/MM3 (ref 140–450)
POTASSIUM SERPL-SCNC: 3.9 MMOL/L (ref 3.5–5.2)
PROT SERPL-MCNC: 6.7 G/DL (ref 6–8.5)
RBC # BLD AUTO: 3.63 10*6/MM3 (ref 3.77–5.28)
SODIUM SERPL-SCNC: 140 MMOL/L (ref 136–145)
T4 FREE SERPL-MCNC: 1.3 NG/DL (ref 0.92–1.68)
TRIGL SERPL-MCNC: 95 MG/DL (ref 0–150)
TSH SERPL DL<=0.005 MIU/L-ACNC: 1.42 UIU/ML (ref 0.27–4.2)
VLDLC SERPL CALC-MCNC: 17 MG/DL (ref 5–40)
WBC # BLD AUTO: 3.95 10*3/MM3 (ref 3.4–10.8)

## 2025-04-15 ENCOUNTER — OFFICE VISIT (OUTPATIENT)
Dept: INTERNAL MEDICINE | Facility: CLINIC | Age: 80
End: 2025-04-15
Payer: MEDICARE

## 2025-04-15 VITALS
OXYGEN SATURATION: 97 % | TEMPERATURE: 97.7 F | HEART RATE: 66 BPM | DIASTOLIC BLOOD PRESSURE: 84 MMHG | BODY MASS INDEX: 32.01 KG/M2 | SYSTOLIC BLOOD PRESSURE: 136 MMHG | WEIGHT: 175 LBS

## 2025-04-15 DIAGNOSIS — E78.2 MIXED HYPERLIPIDEMIA: ICD-10-CM

## 2025-04-15 DIAGNOSIS — I25.810 CORONARY ARTERY DISEASE INVOLVING CORONARY BYPASS GRAFT OF NATIVE HEART WITHOUT ANGINA PECTORIS: ICD-10-CM

## 2025-04-15 DIAGNOSIS — Z12.31 BREAST CANCER SCREENING BY MAMMOGRAM: ICD-10-CM

## 2025-04-15 DIAGNOSIS — Z78.0 POST-MENOPAUSAL: ICD-10-CM

## 2025-04-15 DIAGNOSIS — E03.9 ACQUIRED HYPOTHYROIDISM: ICD-10-CM

## 2025-04-15 DIAGNOSIS — R73.03 PREDIABETES: ICD-10-CM

## 2025-04-15 DIAGNOSIS — Z12.11 COLON CANCER SCREENING: ICD-10-CM

## 2025-04-15 DIAGNOSIS — Z00.00 MEDICARE ANNUAL WELLNESS VISIT, SUBSEQUENT: Primary | ICD-10-CM

## 2025-04-15 NOTE — ASSESSMENT & PLAN NOTE
Orders:    CBC & Differential; Future    Comprehensive Metabolic Panel; Future    Hemoglobin A1c; Future    Lipid Panel With LDL / HDL Ratio; Future    T4, Free; Future    TSH; Future

## 2025-04-15 NOTE — PROGRESS NOTES
Subjective   The ABCs of the Annual Wellness Visit  Medicare Wellness Visit      Carla Serrano is a 80 y.o. patient who presents for a Medicare Wellness Visit.    The following portions of the patient's history were reviewed and   updated as appropriate: allergies, current medications, past family history, past medical history, past social history, past surgical history, and problem list.    Compared to one year ago, the patient's physical   health is the same.  Compared to one year ago, the patient's mental   health is the same.    Recent Hospitalizations:  She was not admitted to the hospital during the last year.     Current Medical Providers:  Patient Care Team:  Hilda Loera MD as PCP - General (Internal Medicine & Pediatrics)  Antony Galvin MD as Consulting Physician (Cardiology)    Outpatient Medications Prior to Visit   Medication Sig Dispense Refill    acetaminophen (TYLENOL) 650 MG 8 hr tablet Take 1 tablet by mouth Every 8 (Eight) Hours As Needed.      aspirin (aspirin) 81 MG EC tablet Take 1 tablet by mouth Daily. 90 tablet 3    atorvastatin (LIPITOR) 40 MG tablet TAKE 1/2 TABLET BY MOUTH DAILY 45 tablet 3    B Complex Vitamins (VITAMIN B COMPLEX IJ) Take 1 tablet by mouth 2 (two) times a day.      carvedilol (COREG) 6.25 MG tablet TAKE TWO TABLETS BY MOUTH TWICE A DAY WITH A MEAL 360 tablet 1    Cholecalciferol (VITAMIN D-3) 1000 UNITS capsule Take 1,000 Units by mouth Every Night.      CO ENZYME Q-10 PO Take 100 mg by mouth 2 (two) times a day.      fexofenadine (ALLEGRA) 60 MG tablet Take 1 tablet by mouth Daily.      Flaxseed, Linseed, (FLAXSEED OIL) 1200 MG capsule Take 2 capsules by mouth daily.      Ginkgo Biloba Extract 60 MG capsule Take 1 tablet by mouth every morning.      Glucosamine-Chondroit-Vit C-Mn (GLUCOSAMINE CHONDR 1500 COMPLX) capsule Take 2 capsules by mouth every morning.      isosorbide mononitrate (IMDUR) 30 MG 24 hr tablet TAKE 1 TABLET BY MOUTH DAILY 90  tablet 3    levothyroxine (SYNTHROID, LEVOTHROID) 50 MCG tablet TAKE 1 TABLET BY MOUTH EVERY MORNING 90 tablet 1    losartan-hydrochlorothiazide (HYZAAR) 100-25 MG per tablet Take 0.5 tablets by mouth Daily.      Misc Natural Products (ECHINACEA GOLDENSEAL PLUS) capsule Take 900 mg by mouth every morning.      Multiple Vitamin (MULTIVITAMINS PO) Take 1 tablet by mouth daily.      pantoprazole (PROTONIX) 40 MG EC tablet TAKE 1 TABLET BY MOUTH DAILY 90 tablet 3    S-Adenosylmethionine (ALLY-e) 200 MG capsule Take 200 mg by mouth 4 (Four) Times a Day.      spironolactone (Aldactone) 50 MG tablet Take 1 tablet by mouth Daily. 90 tablet 3    traZODone (DESYREL) 50 MG tablet TAKE ONE TABLET BY MOUTH ONCE NIGHTLY 90 tablet 1     No facility-administered medications prior to visit.     No opioid medication identified on active medication list. I have reviewed chart for other potential  high risk medication/s and harmful drug interactions in the elderly.      Aspirin is on active medication list. Aspirin use is indicated based on review of current medical condition/s. Pros and cons of this therapy have been discussed today. Benefits of this medication outweigh potential harm.  Patient has been encouraged to continue taking this medication.  .      Patient Active Problem List   Diagnosis    Edema    DJD (degenerative joint disease) of knee    Coronary artery disease involving coronary bypass graft of native heart without angina pectoris    SVT (supraventricular tachycardia)    Essential hypertension    JOVANNA (obstructive sleep apnea)    Mixed hyperlipidemia    Hypothyroidism    Prediabetes    Incontinence in female    Encounter for screening for malignant neoplasm of colon    Vomiting    High risk medication use    Reflux esophagitis    Depression    Epigastric pain    Fever of unknown origin    Acute cough    Acute pyelonephritis     Advance Care Planning Advance Directive is on file.  ACP discussion was held with the patient  "during this visit. Patient has an advance directive in EMR which is still valid.             Objective   Vitals:    04/15/25 1458   BP: 136/84   BP Location: Left arm   Patient Position: Sitting   Cuff Size: Adult   Pulse: 66   Temp: 97.7 °F (36.5 °C)   TempSrc: Infrared   SpO2: 97%   Weight: 79.4 kg (175 lb)   PainSc: 0-No pain       Estimated body mass index is 32.01 kg/m² as calculated from the following:    Height as of 24: 157.5 cm (62\").    Weight as of this encounter: 79.4 kg (175 lb).    BMI is >= 30 and <35. (Class 1 Obesity). The following options were offered after discussion;: exercise counseling/recommendations and nutrition counseling/recommendations         Does the patient have evidence of cognitive impairment? No  Lab Results   Component Value Date    CHLPL 188 2025    TRIG 95 2025    HDL 68 (H) 2025     (H) 2025    VLDL 17 2025    HGBA1C 5.30 2025                                                                                                Health  Risk Assessment    Smoking Status:  Social History     Tobacco Use   Smoking Status Never   Smokeless Tobacco Never     Alcohol Consumption:  Social History     Substance and Sexual Activity   Alcohol Use No       Fall Risk Screen  STEADI Fall Risk Assessment was completed, and patient is at LOW risk for falls.Assessment completed on:4/15/2025    Depression Screening   Little interest or pleasure in doing things? Not at all   Feeling down, depressed, or hopeless? Several days   PHQ-2 Total Score 1      Health Habits and Functional and Cognitive Screenin/15/2025     2:59 PM   Functional & Cognitive Status   Do you have difficulty preparing food and eating? No   Do you have difficulty bathing yourself, getting dressed or grooming yourself? No   Do you have difficulty using the toilet? No   Do you have difficulty moving around from place to place? No   Do you have trouble with steps or getting out of " a bed or a chair? No   Current Diet Well Balanced Diet   Dental Exam Up to date   Eye Exam Up to date   Exercise (times per week) 2 times per week   Current Exercises Include Walking   Do you need help using the phone?  No   Are you deaf or do you have serious difficulty hearing?  No   Do you need help to go to places out of walking distance? No   Do you need help shopping? No   Do you need help preparing meals?  No   Do you need help with housework?  No   Do you need help with laundry? No   Do you need help taking your medications? No   Do you need help managing money? No   Do you ever drive or ride in a car without wearing a seat belt? No   Have you felt unusual stress, anger or loneliness in the last month? Yes   Who do you live with? Spouse   If you need help, do you have trouble finding someone available to you? No   Have you been bothered in the last four weeks by sexual problems? No   Do you have difficulty concentrating, remembering or making decisions? No           Age-appropriate Screening Schedule:  Refer to the list below for future screening recommendations based on patient's age, sex and/or medical conditions. Orders for these recommended tests are listed in the plan section. The patient has been provided with a written plan.    Health Maintenance List  Health Maintenance   Topic Date Due    COLORECTAL CANCER SCREENING  03/29/2025    ZOSTER VACCINE (1 of 2) 04/29/2025 (Originally 4/15/1996)    RSV Vaccine - Adults (1 - 1-dose 75+ series) 04/15/2026 (Originally 4/15/2021)    INFLUENZA VACCINE  07/01/2025    COVID-19 Vaccine (7 - 2024-25 season) 08/05/2025    MAMMOGRAM  09/01/2025    DXA SCAN  09/01/2025    TDAP/TD VACCINES (2 - Td or Tdap) 02/23/2026    LIPID PANEL  04/08/2026    ANNUAL WELLNESS VISIT  04/15/2026    HEPATITIS C SCREENING  Completed    Pneumococcal Vaccine 50+  Completed                                                                                                                                                 CMS Preventative Services Quick Reference  Risk Factors Identified During Encounter  Immunizations Discussed/Encouraged: Shingrix and RSV (Respiratory Syncytial Virus)  Polypharmacy: Medication List reviewed and Medications are appropriate for patient    The above risks/problems have been discussed with the patient.  Pertinent information has been shared with the patient in the After Visit Summary.  An After Visit Summary and PPPS were made available to the patient.    Follow Up:   Next Medicare Wellness visit to be scheduled in 1 year.         Additional E&M Note during same encounter follows:  Patient has additional, significant, and separately identifiable condition(s)/problem(s) that require work above and beyond the Medicare Wellness Visit     Chief Complaint  Medicare Wellness-subsequent    Subjective   HPI  Carla is also being seen today for additional medical problem/s.    Weight down about 5 lbs.  She hasn't had as much of an appetite lately. Her sister was recently diagnosed with breast cancer.    Pt has had lots of stress in life with family.   She was started on trazodone and low dose zoloft.  She is sleeping better.       Chronic CAD - pt has followed with cardiology.  She has chronic LE edema and cardiology adjusted her meds.  Swelling some better.        Mixed stress and urge incontinence - she tried oxybutinin for about a week.  She stopped it bc of worsening constipation.  Last time she was changed to vesicare but didn't try it.       HTN - no ha/dizziness.  Her bp's at home have been well controlled.       Hypothyroidism - no hair/skin changes.      HLD - on statin.  No cramps or myalgias.    Her allergies have been flared lately.   She is taking 12 hour allegra at night.  Sometimes she still has breakthrough conjection.             Objective   Vital Signs:  /84 (BP Location: Left arm, Patient Position: Sitting, Cuff Size: Adult)   Pulse 66   Temp 97.7 °F (36.5 °C)  (Infrared)   Wt 79.4 kg (175 lb)   SpO2 97%   BMI 32.01 kg/m²     Wt Readings from Last 3 Encounters:   04/15/25 79.4 kg (175 lb)   12/19/24 82 kg (180 lb 12.8 oz)   10/08/24 80.7 kg (178 lb)     Temp Readings from Last 3 Encounters:   04/15/25 97.7 °F (36.5 °C) (Infrared)   10/08/24 99.5 °F (37.5 °C) (Temporal)   08/30/24 98 °F (36.7 °C) (Infrared)     BP Readings from Last 3 Encounters:   04/15/25 136/84   01/03/25 110/90   12/19/24 110/74     Pulse Readings from Last 3 Encounters:   04/15/25 66   01/03/25 67   12/19/24 65       Physical Exam    The following data was reviewed by: Hilda Loera MD on 04/15/2025:    Common labs          8/23/2024    09:07 10/8/2024    11:41 4/8/2025    10:09   Common Labs   Glucose 88  113  94    BUN 17  21  18    Creatinine 0.81  0.97  0.93    Sodium 141  138  140    Potassium 3.6  2.8  3.9    Chloride 103  100  104    Calcium 9.5  10.0  10.0    Albumin 4.1  3.9  4.1    Total Bilirubin 0.4  0.8  0.5    Alkaline Phosphatase 56  86  56    AST (SGOT) 14  26  21    ALT (SGPT) 16  22  19    WBC 3.45  8.19  3.95    Hemoglobin 12.2  12.1  12.6    Hematocrit 35.4  35.1  36.4    Platelets 200  192  231    Total Cholesterol 174   188    Triglycerides 99   95    HDL Cholesterol 63   68    LDL Cholesterol  93   103    Hemoglobin A1C 5.30   5.30           Assessment and Plan     Diagnoses and all orders for this visit:    1. Medicare annual wellness visit, subsequent (Primary)    2. Colon cancer screening  -     Cologuard - Stool, Per Rectum; Future    3. Post-menopausal  -     DEXA Bone Density Axial; Future    4. Breast cancer screening by mammogram  -     Mammo Screening Digital Tomosynthesis Bilateral With CAD; Future    5. Coronary artery disease involving coronary bypass graft of native heart without angina pectoris - cont asa, carvedilol, losartan, hctz, statin.  Keep f/u with cardiology.   -     CBC & Differential; Future  -     Comprehensive Metabolic Panel; Future  -      Hemoglobin A1c; Future  -     Lipid Panel With LDL / HDL Ratio; Future  -     T4, Free; Future  -     TSH; Future    6. Mixed hyperlipidemia - cont lipitor  -     CBC & Differential; Future  -     Comprehensive Metabolic Panel; Future  -     Hemoglobin A1c; Future  -     Lipid Panel With LDL / HDL Ratio; Future  -     T4, Free; Future  -     TSH; Future    7. Acquired hypothyroidism- cont synthroid  -     CBC & Differential; Future  -     Comprehensive Metabolic Panel; Future  -     Hemoglobin A1c; Future  -     Lipid Panel With LDL / HDL Ratio; Future  -     T4, Free; Future  -     TSH; Future    8. Prediabetes  -     CBC & Differential; Future  -     Comprehensive Metabolic Panel; Future  -     Hemoglobin A1c; Future  -     Lipid Panel With LDL / HDL Ratio; Future  -     T4, Free; Future  -     TSH; Future     Cont ppi for gerd.  Not currently following with GI.     Colon cancer screening    Orders:    Cologuard - Stool, Per Rectum; Future    Medicare annual wellness visit, subsequent         Post-menopausal    Orders:    DEXA Bone Density Axial; Future    Breast cancer screening by mammogram    Orders:    Mammo Screening Digital Tomosynthesis Bilateral With CAD; Future    Coronary artery disease involving coronary bypass graft of native heart without angina pectoris      Orders:    CBC & Differential; Future    Comprehensive Metabolic Panel; Future    Hemoglobin A1c; Future    Lipid Panel With LDL / HDL Ratio; Future    T4, Free; Future    TSH; Future    Mixed hyperlipidemia       Orders:    CBC & Differential; Future    Comprehensive Metabolic Panel; Future    Hemoglobin A1c; Future    Lipid Panel With LDL / HDL Ratio; Future    T4, Free; Future    TSH; Future    Acquired hypothyroidism    Orders:    CBC & Differential; Future    Comprehensive Metabolic Panel; Future    Hemoglobin A1c; Future    Lipid Panel With LDL / HDL Ratio; Future    T4, Free; Future    TSH; Future    Prediabetes    Orders:    CBC &  Differential; Future    Comprehensive Metabolic Panel; Future    Hemoglobin A1c; Future    Lipid Panel With LDL / HDL Ratio; Future    T4, Free; Future    TSH; Future            Follow Up   No follow-ups on file.  Patient was given instructions and counseling regarding her condition or for health maintenance advice. Please see specific information pulled into the AVS if appropriate.

## 2025-05-05 RX ORDER — ATORVASTATIN CALCIUM 40 MG/1
20 TABLET, FILM COATED ORAL DAILY
Qty: 45 TABLET | Refills: 0 | Status: SHIPPED | OUTPATIENT
Start: 2025-05-05

## 2025-05-09 ENCOUNTER — TELEPHONE (OUTPATIENT)
Dept: INTERNAL MEDICINE | Facility: CLINIC | Age: 80
End: 2025-05-09
Payer: MEDICARE

## 2025-05-09 DIAGNOSIS — R19.5 POSITIVE COLORECTAL CANCER SCREENING USING COLOGUARD TEST: Primary | ICD-10-CM

## 2025-05-14 ENCOUNTER — TELEPHONE (OUTPATIENT)
Dept: INTERNAL MEDICINE | Facility: CLINIC | Age: 80
End: 2025-05-14
Payer: MEDICARE

## 2025-05-14 NOTE — TELEPHONE ENCOUNTER
"Ok for hub to relay   Relay     \"Cologuard is positive. The next step is to refer for colonoscopy to see if pt actually has any abnormality in colon. Referral placed. \"                   "

## 2025-05-23 RX ORDER — CARVEDILOL 6.25 MG/1
TABLET ORAL
Qty: 360 TABLET | Refills: 1 | Status: SHIPPED | OUTPATIENT
Start: 2025-05-23

## 2025-05-23 NOTE — TELEPHONE ENCOUNTER
Rx Refill Note  Requested Prescriptions     Pending Prescriptions Disp Refills    carvedilol (COREG) 6.25 MG tablet [Pharmacy Med Name: CARVEDILOL 6.25 MG TABLET] 360 tablet 1     Sig: TAKE TWO TABLETS BY MOUTH TWICE A DAY WITH A MEAL      Last office visit with prescribing clinician: 4/15/2025   Last telemedicine visit with prescribing clinician: Visit date not found   Next office visit with prescribing clinician: 10/28/2025                         Would you like a call back once the refill request has been completed: [] Yes [] No    If the office needs to give you a call back, can they leave a voicemail: [] Yes [] No    Maki Peralta MA  05/23/25, 09:43 EDT

## 2025-06-02 NOTE — PROGRESS NOTES
PCP:  Hilda Loera MD                                                                         ROOM:____________    : 1945  AGE: 80 y.o.    ALLERGIES:Latex    LAST OV:______________________ LAST EKG:_______________ LAST WEIGHT:   Wt Readings from Last 1 Encounters:   04/15/25 79.4 kg (175 lb)        BP Readings from Last 3 Encounters:   04/15/25 136/84   25 110/90   24 110/74        WT: ____________  BP: __________ HR ______   02% _______      CHEST PAIN: YES OR NO                                           LIGHTHEADED: YES OR NO    SOA: YES OR NO                    FATIGUE: YES OR NO    PALPITATIONS: YES OR NO                                      EDEMA: YES OR NO    SLEEP APNEA: YES OR NO                                     CPAP     OR    BIPAP                                                  SMOKING:   Social History     Socioeconomic History    Marital status:    Tobacco Use    Smoking status: Never    Smokeless tobacco: Never   Vaping Use    Vaping status: Never Used   Substance and Sexual Activity    Alcohol use: No    Drug use: No    Sexual activity: Defer

## 2025-06-19 ENCOUNTER — OFFICE VISIT (OUTPATIENT)
Dept: CARDIOLOGY | Facility: CLINIC | Age: 80
End: 2025-06-19
Payer: MEDICARE

## 2025-06-19 VITALS
BODY MASS INDEX: 29.5 KG/M2 | HEIGHT: 64 IN | WEIGHT: 172.8 LBS | OXYGEN SATURATION: 96 % | DIASTOLIC BLOOD PRESSURE: 84 MMHG | HEART RATE: 66 BPM | SYSTOLIC BLOOD PRESSURE: 120 MMHG

## 2025-06-19 DIAGNOSIS — I10 ESSENTIAL HYPERTENSION: ICD-10-CM

## 2025-06-19 DIAGNOSIS — I25.810 CORONARY ARTERY DISEASE INVOLVING CORONARY BYPASS GRAFT OF NATIVE HEART WITHOUT ANGINA PECTORIS: Primary | ICD-10-CM

## 2025-06-19 DIAGNOSIS — Z79.899 HIGH RISK MEDICATION USE: ICD-10-CM

## 2025-06-19 DIAGNOSIS — E78.2 MIXED HYPERLIPIDEMIA: ICD-10-CM

## 2025-06-19 PROCEDURE — 3079F DIAST BP 80-89 MM HG: CPT | Performed by: INTERNAL MEDICINE

## 2025-06-19 PROCEDURE — 93000 ELECTROCARDIOGRAM COMPLETE: CPT | Performed by: INTERNAL MEDICINE

## 2025-06-19 PROCEDURE — 3074F SYST BP LT 130 MM HG: CPT | Performed by: INTERNAL MEDICINE

## 2025-06-19 PROCEDURE — 99214 OFFICE O/P EST MOD 30 MIN: CPT | Performed by: INTERNAL MEDICINE

## 2025-06-19 RX ORDER — LOSARTAN POTASSIUM AND HYDROCHLOROTHIAZIDE 12.5; 5 MG/1; MG/1
1 TABLET ORAL DAILY
Qty: 90 TABLET | Refills: 3 | Status: SHIPPED | OUTPATIENT
Start: 2025-06-19

## 2025-06-19 RX ORDER — FUROSEMIDE 40 MG/1
40 TABLET ORAL DAILY PRN
Qty: 30 TABLET | Refills: 3 | Status: SHIPPED | OUTPATIENT
Start: 2025-06-19

## 2025-06-19 RX ORDER — POTASSIUM CHLORIDE 1500 MG/1
20 TABLET, EXTENDED RELEASE ORAL DAILY PRN
Qty: 30 TABLET | Refills: 1 | Status: SHIPPED | OUTPATIENT
Start: 2025-06-19

## 2025-06-19 RX ORDER — ATORVASTATIN CALCIUM 40 MG/1
40 TABLET, FILM COATED ORAL DAILY
Qty: 30 TABLET | Refills: 11 | Status: SHIPPED | OUTPATIENT
Start: 2025-06-19

## 2025-06-19 RX ORDER — CARVEDILOL 3.12 MG/1
3.12 TABLET ORAL 2 TIMES DAILY
Qty: 180 TABLET | Refills: 3 | Status: SHIPPED | OUTPATIENT
Start: 2025-06-19

## 2025-06-19 NOTE — PROGRESS NOTES
PATIENTINFORMATION    Date of Office Visit: 2025  Encounter Provider: Antony Galvin MD  Place of Service: [facility] CARDIOLOGY  Patient Name: Carla Serrano  : 1945    Subjective:     Encounter Date:2025      Patient ID: Carla Serrano is a 80 y.o. female.    Chief Complaint   Patient presents with    Follow-up       HPI  Ms. Serrano is a pleasant 79 yo lady with cardiology clinic for follow-up visit.  She is fairly active and does gardening for about 2 hours 3 times weekly without chest discomfort.  She has chronic fatigue but denies recent exertional chest pain, significant shortness of breath, orthopnea, PND, palpitations, presyncope or extremity swelling.  Blood pressure slightly on the lower side during home monitoring.  She is concerned about being off balance and cannot keep straight leaning to left side.  She does not feel like she is going to fall.  And no recent falls      ROS  All systems reviewed and negative except as noted in HPI.    Past Medical History:   Diagnosis Date    Allergic rhinitis     Anemia     Anxiety     Arthritis     CAD (coronary artery disease)     h/o CABG x3    Cardiomyopathy     Degenerative joint disease of knee, left     sched replacement    Edema     GERD (gastroesophageal reflux disease)     Heart disease     Hiatal hernia     Hyperlipidemia     Hypertension     Hypothyroidism     Incontinence in female 08/10/2017    JOVANNA (obstructive sleep apnea)     no machine, uses nasal strips    Osteoporosis     Prediabetes     SVT (supraventricular tachycardia)     Urinary, incontinence, stress female        Past Surgical History:   Procedure Laterality Date    BREAST BIOPSY Left     CORONARY ARTERY BYPASS GRAFT      CORONARY ARTERY BYPASS GRAFT      triple bypass @ [facility]    HYSTERECTOMY      KNEE SURGERY Left      cartilage removal    WI ARTHRP KNE CONDYLE&PLATU MEDIAL&LAT COMPARTMENTS Left 3/29/2016    Procedure: LEFT  "TOTAL KNEE ARTHROPLASTY placement of hemovac drain;  Surgeon: Tj Barraza MD;  Location: [facility];  Service: Orthopedics       Social History     Socioeconomic History    Marital status:    Tobacco Use    Smoking status: Never    Smokeless tobacco: Never   Vaping Use    Vaping status: Never Used   Substance and Sexual Activity    Alcohol use: No    Drug use: No    Sexual activity: Defer       Family History   Problem Relation Age of Onset    Heart disease Mother 50    Heart attack Father 50    Breast cancer Sister     Arrhythmia Sister     Breast cancer Sister     No Known Problems Sister     No Known Problems Sister     Coronary artery disease Other            ECG 12 Lead    Date/Time: 6/19/2025 2:11 PM  Performed by: Antony Galvin MD    Authorized by: Antony Galvin MD  Comparison: compared with previous ECG from 6/6/2024  Similar to previous ECG  Rhythm: sinus rhythm  Rate: normal  Conduction: conduction normal  ST Segments: ST segments normal  T Waves: T waves normal  QRS axis: normal  Other: no other findings    Clinical impression: normal ECG             Objective:     /84   Pulse 66   Ht 162.6 cm (64\")   Wt 78.4 kg (172 lb 12.8 oz)   SpO2 96%   BMI 29.66 kg/m²  Body mass index is 29.66 kg/m².     Constitutional:       General: Not in acute distress.     Appearance: Well-developed. Not diaphoretic.   Eyes:      Pupils: Pupils are equal, round, and reactive to light.   HENT:      Head: Normocephalic and atraumatic.   Neck:      Thyroid: No thyromegaly.   Pulmonary:      Effort: Pulmonary effort is normal. No respiratory distress.      Breath sounds: Normal breath sounds. No wheezing. No rales.   Chest:      Chest wall: Not tender to palpatation.   Cardiovascular:      Normal rate. Regular rhythm.      No gallop.    Pulses:     Intact distal pulses.   Edema:     Peripheral edema absent.   Abdominal:      General: Bowel sounds are normal. There is no distension.      " Palpations: Abdomen is soft.      Tenderness: There is no guarding.   Musculoskeletal: Normal range of motion.         General: No deformity.      Cervical back: Normal range of motion and neck supple. Skin:     General: Skin is warm and dry.      Findings: No rash.   Neurological:      Mental Status: Alert and oriented to person, place, and time.      Cranial Nerves: No cranial nerve deficit.      Deep Tendon Reflexes: Reflexes are normal and symmetric.   Psychiatric:         Judgment: Judgment normal.         Review Of Data: I have reviewed pertinent recent labs, images and documents and pertinent findings included in HPI or assessment below.    Lipid Panel          8/23/2024    09:07 4/8/2025    10:09   Lipid Panel   Total Cholesterol 174  188    Triglycerides 99  95    HDL Cholesterol 63  68    VLDL Cholesterol 18  17    LDL Cholesterol  93  103    LDL/HDL Ratio 1.45  1.49      Assessment/Plan:      Hx of CAD s/p CABGX3 in 2011 with LIMA to LAD, SVG to OM and SVG to PDA. Myocardial perfusion study on 3/24/2022 showed small inferolateral scar with mild cora-infarct ischemia. Prior echocardiogram shows preserved left ventricular ejection fraction.Hx of intolerance to Plavix started during prior visits.  Currently on aspirin and a statin.  Hypercholesterolemia-on high intensity statin-lipid panel near goal  Hypertension-on Coreg and losartan/hydrochlorthiazide  History of paroxysmal SVT in the perioop periord in 2011-remains in sinus rhythm  Obstructive sleep apnea but could not tolerate CPAP   Chronic degenerative joint disease-left knee replaced.    Bilateral ankle and feet edema-intermittent  Stress/depression/sleep problems-significantly improved with trazodone  Chronic hypokalemia on oral supplement-potassium switched to Aldactone during prior visits.  Potassium level corrected.    Other than intermittent low blood pressure reading at home likely causing fatigue no significant new symptoms today.  She is  fairly active without exertional symptoms  I have decreased Coreg from 6.25-3.125 twice daily  Increased atorvastatin to 40 mg p.o. daily.  As needed Lasix/potassium for worsened ankle and feet edema.  Otherwise continue current care and activities.    Check BMP  Follow-up in 6 months or sooner with concerns      Diagnosis and plan of care discussed with patient and verbalized understanding.            Your medication list            Accurate as of June 19, 2025  2:11 PM. If you have any questions, ask your nurse or doctor.                START taking these medications        Instructions Last Dose Given Next Dose Due   furosemide 40 MG tablet  Commonly known as: LASIX  Started by: Antony Galvin      Take 1 tablet by mouth Daily As Needed (leg swelling).       losartan-hydrochlorothiazide 50-12.5 MG per tablet  Commonly known as: Hyzaar  Replaces: losartan-hydrochlorothiazide 100-25 MG per tablet  Started by: Antony Galvin      Take 1 tablet by mouth Daily.       potassium chloride ER 20 MEQ tablet controlled-release ER tablet  Commonly known as: K-TAB  Started by: Antony Galvin      Take 1 tablet by mouth Daily As Needed (with Lasix).              CHANGE how you take these medications        Instructions Last Dose Given Next Dose Due   atorvastatin 40 MG tablet  Commonly known as: LIPITOR  What changed: how much to take  Changed by: Antony Everettmariam      Take 1 tablet by mouth Daily.       carvedilol 3.125 MG tablet  Commonly known as: COREG  What changed:   medication strength  See the new instructions.  Changed by: Antony Deweym      Take 1 tablet by mouth 2 (Two) Times a Day.              CONTINUE taking these medications        Instructions Last Dose Given Next Dose Due   acetaminophen 650 MG 8 hr tablet  Commonly known as: TYLENOL      Take 1 tablet by mouth Every 8 (Eight) Hours As Needed.       aspirin 81 MG EC tablet      Take 1 tablet by mouth Daily.       CO ENZYME Q-10 PO      Take 100  mg by mouth 2 (two) times a day.       Echinacea Goldenseal Plus capsule      Take 900 mg by mouth every morning.       fexofenadine 60 MG tablet  Commonly known as: ALLEGRA      Take 1 tablet by mouth Daily.       Flaxseed Oil 1200 MG capsule      Take 2 capsules by mouth daily.       Ginkgo Biloba Extract 60 MG capsule      Take 1 tablet by mouth every morning.       Glucosamine Chondr 1500 Complx capsule      Take 2 capsules by mouth every morning.       isosorbide mononitrate 30 MG 24 hr tablet  Commonly known as: IMDUR      TAKE 1 TABLET BY MOUTH DAILY       levothyroxine 50 MCG tablet  Commonly known as: SYNTHROID, LEVOTHROID      TAKE 1 TABLET BY MOUTH EVERY MORNING       MULTIVITAMINS PO      Take 1 tablet by mouth daily.       pantoprazole 40 MG EC tablet  Commonly known as: PROTONIX      TAKE 1 TABLET BY MOUTH DAILY       ALLY-e 200 MG capsule      Take 200 mg by mouth 4 (Four) Times a Day.       spironolactone 50 MG tablet  Commonly known as: Aldactone      Take 1 tablet by mouth Daily.       traZODone 50 MG tablet  Commonly known as: DESYREL      TAKE ONE TABLET BY MOUTH ONCE NIGHTLY       VITAMIN B COMPLEX IJ      Take 1 tablet by mouth 2 (two) times a day.       Vitamin D-3 25 MCG (1000 UT) capsule      Take 1,000 Units by mouth Every Night.              STOP taking these medications      losartan-hydrochlorothiazide 100-25 MG per tablet  Commonly known as: HYZAAR  Replaced by: losartan-hydrochlorothiazide 50-12.5 MG per tablet  Stopped by: Antony Galvin                  Where to Get Your Medications        These medications were sent to [facility] [id number] - [place] - [address] - [phone]  - [phone]   [address] [place] [address]      Phone: [phone]   atorvastatin 40 MG tablet  carvedilol 3.125 MG tablet  furosemide 40 MG tablet  losartan-hydrochlorothiazide 50-12.5 MG per tablet  potassium chloride ER 20 MEQ tablet controlled-release ER tablet             Antony  YOLIS Galvin MD  06/19/25  14:11 EDT

## 2025-06-20 ENCOUNTER — OFFICE VISIT (OUTPATIENT)
Dept: INTERNAL MEDICINE | Facility: CLINIC | Age: 80
End: 2025-06-20
Payer: MEDICARE

## 2025-06-20 ENCOUNTER — TELEPHONE (OUTPATIENT)
Dept: INTERNAL MEDICINE | Facility: CLINIC | Age: 80
End: 2025-06-20

## 2025-06-20 VITALS
DIASTOLIC BLOOD PRESSURE: 74 MMHG | BODY MASS INDEX: 30.26 KG/M2 | OXYGEN SATURATION: 95 % | WEIGHT: 176.3 LBS | HEART RATE: 70 BPM | TEMPERATURE: 97.8 F | SYSTOLIC BLOOD PRESSURE: 110 MMHG

## 2025-06-20 DIAGNOSIS — R26.9 ABNORMALITY OF GAIT: Primary | ICD-10-CM

## 2025-06-20 DIAGNOSIS — R19.5 POSITIVE COLORECTAL CANCER SCREENING USING COLOGUARD TEST: ICD-10-CM

## 2025-06-20 DIAGNOSIS — R26.89 BALANCE PROBLEM: ICD-10-CM

## 2025-06-20 DIAGNOSIS — R29.818 ROMBERG TEST POSITIVE: ICD-10-CM

## 2025-06-20 NOTE — TELEPHONE ENCOUNTER
I left her a message and I spoke with Mr. Serrano and he will try to get a hold of her.  I made her an appointment with Dr. Loera today.

## 2025-06-20 NOTE — PROGRESS NOTES
Carla Serrano is a 80 y.o. female, who presents with a chief complaint of No chief complaint on file.          HPI   Pt saw cardiology this am.  She mentioned to her cardiologist that she is leaning to the left when she walks.  She denies dizziness.  No known stroke.  Bp well controlled.  No recent fall.  She is on high dose statin therapy.      The following portions of the patient's history were reviewed and updated as appropriate: allergies, current medications, past family history, past medical history, past social history, past surgical history and problem list.    Allergies: Latex    Review of Systems   Constitutional: Negative.    HENT: Negative.     Eyes: Negative.    Respiratory: Negative.     Cardiovascular: Negative.    Gastrointestinal: Negative.    Endocrine: Negative.    Genitourinary: Negative.    Musculoskeletal:  Positive for gait problem and neck pain.   Skin: Negative.    Allergic/Immunologic: Negative.    Hematological: Negative.    Psychiatric/Behavioral: Negative.     All other systems reviewed and are negative.            Wt Readings from Last 3 Encounters:   06/20/25 80 kg (176 lb 4.8 oz)   06/19/25 78.4 kg (172 lb 12.8 oz)   04/15/25 79.4 kg (175 lb)     Temp Readings from Last 3 Encounters:   06/20/25 97.8 °F (36.6 °C) (Infrared)   04/15/25 97.7 °F (36.5 °C) (Infrared)   10/08/24 99.5 °F (37.5 °C) (Temporal)     BP Readings from Last 3 Encounters:   06/20/25 110/74   06/19/25 120/84   04/15/25 136/84     Pulse Readings from Last 3 Encounters:   06/20/25 70   06/19/25 66   04/15/25 66     Body mass index is 30.26 kg/m².  SpO2 Readings from Last 3 Encounters:   06/20/25 95%   06/19/25 96%   04/15/25 97%          Physical Exam    Results for orders placed or performed in visit on 04/15/25   Cologuard - Stool, Per Rectum    Collection Time: 04/28/25 11:35 AM    Specimen: Per Rectum; Stool   Result Value Ref Range    Cologuard Positive (A) Negative     Result Review :                   Assessment and Plan    Diagnoses and all orders for this visit:    1. Abnormality of gait (Primary) - cont asa, statin, and bp meds.  Needs further work up to look for stroke.    -     MRI Brain With & Without Contrast; Future  -     US Carotid Bilateral; Future  -     Ambulatory Referral to Physical Therapy for Evaluation & Treatment    2. Balance problem  -     MRI Brain With & Without Contrast; Future  -     US Carotid Bilateral; Future  -     Ambulatory Referral to Physical Therapy for Evaluation & Treatment    3. 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  Needs further work up to look for etiology.  -     MRI Brain With & Without Contrast; Future  -     US Carotid Bilateral; Future  -     Ambulatory Referral to Physical Therapy for Evaluation & Treatment    4. Positive colorectal cancer screening using Cologuard test  -     Ambulatory Referral to Gastroenterology     Pt had positive cologuard.  She has had a colonoscopy in the past and is adamant that she does not want another c-scope.  She is agreeable to discuss options with GI.                   Outpatient Medications Prior to Visit   Medication Sig Dispense Refill    acetaminophen (TYLENOL) 650 MG 8 hr tablet Take 1 tablet by mouth Every 8 (Eight) Hours As Needed.      aspirin (aspirin) 81 MG EC tablet Take 1 tablet by mouth Daily. 90 tablet 3    atorvastatin (LIPITOR) 40 MG tablet Take 1 tablet by mouth Daily. 30 tablet 11    B Complex Vitamins (VITAMIN B COMPLEX IJ) Take 1 tablet by mouth 2 (two) times a day.      carvedilol (COREG) 3.125 MG tablet Take 1 tablet by mouth 2 (Two) Times a Day. 180 tablet 3    Cholecalciferol (VITAMIN D-3) 1000 UNITS capsule Take 1,000 Units by mouth Every Night.      CO ENZYME Q-10 PO Take 100 mg by mouth 2 (two) times a day.      fexofenadine (ALLEGRA) 60 MG tablet Take 1 tablet by mouth Daily.      Flaxseed, Linseed, (FLAXSEED OIL) 1200 MG capsule Take 2 capsules by mouth daily.      furosemide (LASIX) 40 MG tablet Take 1  tablet by mouth Daily As Needed (leg swelling). 30 tablet 3    Ginkgo Biloba Extract 60 MG capsule Take 1 tablet by mouth every morning.      Glucosamine-Chondroit-Vit C-Mn (GLUCOSAMINE CHONDR 1500 COMPLX) capsule Take 2 capsules by mouth every morning.      isosorbide mononitrate (IMDUR) 30 MG 24 hr tablet TAKE 1 TABLET BY MOUTH DAILY 90 tablet 3    levothyroxine (SYNTHROID, LEVOTHROID) 50 MCG tablet TAKE 1 TABLET BY MOUTH EVERY MORNING 90 tablet 1    losartan-hydrochlorothiazide (Hyzaar) 50-12.5 MG per tablet Take 1 tablet by mouth Daily. 90 tablet 3    Misc Natural Products (ECHINACEA GOLDENSEAL PLUS) capsule Take 900 mg by mouth every morning.      Multiple Vitamin (MULTIVITAMINS PO) Take 1 tablet by mouth daily.      pantoprazole (PROTONIX) 40 MG EC tablet TAKE 1 TABLET BY MOUTH DAILY 90 tablet 3    potassium chloride (K-TAB) 20 MEQ tablet controlled-release ER tablet Take 1 tablet by mouth Daily As Needed (with Lasix). 30 tablet 1    S-Adenosylmethionine (ALLY-e) 200 MG capsule Take 200 mg by mouth 4 (Four) Times a Day.      spironolactone (Aldactone) 50 MG tablet Take 1 tablet by mouth Daily. 90 tablet 3    traZODone (DESYREL) 50 MG tablet TAKE ONE TABLET BY MOUTH ONCE NIGHTLY 90 tablet 1     No facility-administered medications prior to visit.     No orders of the defined types were placed in this encounter.    [unfilled]  There are no discontinued medications.      Return in about 1 month (around 7/20/2025) for Recheck.    Patient was given instructions and counseling regarding her condition or for health maintenance advice. Please see specific information pulled into the AVS if appropriate.

## 2025-07-17 ENCOUNTER — TELEPHONE (OUTPATIENT)
Dept: INTERNAL MEDICINE | Facility: CLINIC | Age: 80
End: 2025-07-17
Payer: MEDICARE

## 2025-07-17 NOTE — TELEPHONE ENCOUNTER
"OK for hub to relay   Relay     \" noticed you have not scheduled your MRI yet and would like to encourage you to schedule that as soon as possible if the scheduling department has not called you yet their number is [phone] so you can teena land get it scheduled. \"                   "

## 2025-07-26 DIAGNOSIS — E03.9 ACQUIRED HYPOTHYROIDISM: ICD-10-CM

## 2025-07-28 RX ORDER — LEVOTHYROXINE SODIUM 50 UG/1
50 TABLET ORAL EVERY MORNING
Qty: 90 TABLET | Refills: 1 | Status: SHIPPED | OUTPATIENT
Start: 2025-07-28

## 2025-07-28 NOTE — TELEPHONE ENCOUNTER
Rx Refill Note  Requested Prescriptions     Pending Prescriptions Disp Refills    levothyroxine (SYNTHROID, LEVOTHROID) 50 MCG tablet [Pharmacy Med Name: LEVOTHYROXINE 50 MCG TABLET] 90 tablet 1     Sig: TAKE 1 TABLET BY MOUTH EVERY MORNING      Last office visit with prescribing clinician: 6/20/2025   Last telemedicine visit with prescribing clinician: Visit date not found   Next office visit with prescribing clinician: 10/28/2025                         Would you like a call back once the refill request has been completed: [] Yes [] No    If the office needs to give you a call back, can they leave a voicemail: [] Yes [] No    Mari Post MA  07/28/25, 10:49 EDT

## 2025-08-01 RX ORDER — ATORVASTATIN CALCIUM 40 MG/1
TABLET, FILM COATED ORAL
Qty: 45 TABLET | Refills: 1 | Status: SHIPPED | OUTPATIENT
Start: 2025-08-01

## 2025-08-16 DIAGNOSIS — G47.9 SLEEP DIFFICULTIES: ICD-10-CM

## 2025-08-18 RX ORDER — TRAZODONE HYDROCHLORIDE 50 MG/1
50 TABLET ORAL NIGHTLY
Qty: 90 TABLET | Refills: 1 | Status: SHIPPED | OUTPATIENT
Start: 2025-08-18

## 2025-08-20 RX ORDER — POTASSIUM CHLORIDE 1500 MG/1
TABLET, EXTENDED RELEASE ORAL
Qty: 30 TABLET | Refills: 1 | Status: SHIPPED | OUTPATIENT
Start: 2025-08-20